# Patient Record
Sex: FEMALE | Race: WHITE | Employment: UNEMPLOYED
[De-identification: names, ages, dates, MRNs, and addresses within clinical notes are randomized per-mention and may not be internally consistent; named-entity substitution may affect disease eponyms.]

---

## 2017-01-09 RX ORDER — ERGOCALCIFEROL 1.25 MG/1
50000 CAPSULE ORAL WEEKLY
Qty: 12 CAPSULE | Refills: 0 | Status: SHIPPED | OUTPATIENT
Start: 2017-01-09 | End: 2017-04-04 | Stop reason: SDUPTHER

## 2017-01-16 ENCOUNTER — TELEPHONE (OUTPATIENT)
Dept: ONCOLOGY | Facility: CLINIC | Age: 37
End: 2017-01-16

## 2017-01-16 ENCOUNTER — OFFICE VISIT (OUTPATIENT)
Dept: ONCOLOGY | Facility: CLINIC | Age: 37
End: 2017-01-16

## 2017-01-16 VITALS
HEART RATE: 89 BPM | DIASTOLIC BLOOD PRESSURE: 74 MMHG | TEMPERATURE: 97.6 F | BODY MASS INDEX: 35.78 KG/M2 | SYSTOLIC BLOOD PRESSURE: 118 MMHG | WEIGHT: 195.6 LBS | RESPIRATION RATE: 20 BRPM

## 2017-01-16 DIAGNOSIS — D72.829 LEUKOCYTOSIS, UNSPECIFIED TYPE: Primary | ICD-10-CM

## 2017-01-16 PROCEDURE — 99214 OFFICE O/P EST MOD 30 MIN: CPT | Performed by: INTERNAL MEDICINE

## 2017-01-17 ENCOUNTER — OFFICE VISIT (OUTPATIENT)
Dept: FAMILY MEDICINE CLINIC | Facility: CLINIC | Age: 37
End: 2017-01-17

## 2017-01-17 VITALS
DIASTOLIC BLOOD PRESSURE: 80 MMHG | HEIGHT: 62 IN | SYSTOLIC BLOOD PRESSURE: 122 MMHG | TEMPERATURE: 98 F | WEIGHT: 196 LBS | OXYGEN SATURATION: 99 % | HEART RATE: 91 BPM | BODY MASS INDEX: 36.07 KG/M2

## 2017-01-17 DIAGNOSIS — E78.5 HYPERLIPIDEMIA, UNSPECIFIED HYPERLIPIDEMIA TYPE: Chronic | ICD-10-CM

## 2017-01-17 DIAGNOSIS — R79.89 ABNORMAL TSH: ICD-10-CM

## 2017-01-17 DIAGNOSIS — D72.829 LEUKOCYTOSIS, UNSPECIFIED TYPE: ICD-10-CM

## 2017-01-17 DIAGNOSIS — E55.9 VITAMIN D DEFICIENCY: Primary | ICD-10-CM

## 2017-01-17 PROCEDURE — 96372 THER/PROPH/DIAG INJ SC/IM: CPT | Performed by: PHYSICIAN ASSISTANT

## 2017-01-17 PROCEDURE — 99213 OFFICE O/P EST LOW 20 MIN: CPT | Performed by: PHYSICIAN ASSISTANT

## 2017-01-17 RX ORDER — KETOROLAC TROMETHAMINE 30 MG/ML
60 INJECTION, SOLUTION INTRAMUSCULAR; INTRAVENOUS ONCE
Status: COMPLETED | OUTPATIENT
Start: 2017-01-17 | End: 2017-01-17

## 2017-01-17 RX ORDER — BUTALBITAL, ACETAMINOPHEN AND CAFFEINE 50; 325; 40 MG/1; MG/1; MG/1
1 TABLET ORAL EVERY 6 HOURS PRN
Qty: 40 TABLET | Refills: 0 | Status: SHIPPED | OUTPATIENT
Start: 2017-01-17 | End: 2018-03-05 | Stop reason: ALTCHOICE

## 2017-01-17 RX ORDER — TRIAMCINOLONE ACETONIDE 40 MG/ML
40 INJECTION, SUSPENSION INTRA-ARTICULAR; INTRAMUSCULAR ONCE
Status: COMPLETED | OUTPATIENT
Start: 2017-01-17 | End: 2017-01-17

## 2017-01-17 RX ORDER — PROMETHAZINE HYDROCHLORIDE 12.5 MG/1
12.5 TABLET ORAL EVERY 8 HOURS PRN
Qty: 30 TABLET | Refills: 0 | Status: SHIPPED | OUTPATIENT
Start: 2017-01-17 | End: 2017-02-16

## 2017-01-17 RX ADMIN — TRIAMCINOLONE ACETONIDE 40 MG: 40 INJECTION, SUSPENSION INTRA-ARTICULAR; INTRAMUSCULAR at 13:43

## 2017-01-17 RX ADMIN — KETOROLAC TROMETHAMINE 60 MG: 30 INJECTION, SOLUTION INTRAMUSCULAR; INTRAVENOUS at 13:42

## 2017-01-17 ASSESSMENT — ENCOUNTER SYMPTOMS
NAUSEA: 0
CHEST TIGHTNESS: 0
SORE THROAT: 0
EYE ITCHING: 0
BLURRED VISION: 1
BACK PAIN: 0
EYE DISCHARGE: 0
SHORTNESS OF BREATH: 0
RHINORRHEA: 0
COUGH: 0
VOMITING: 0
SINUS PRESSURE: 1
ABDOMINAL PAIN: 0
EYE PAIN: 0
DIARRHEA: 0

## 2017-01-24 ENCOUNTER — TELEPHONE (OUTPATIENT)
Dept: FAMILY MEDICINE CLINIC | Facility: CLINIC | Age: 37
End: 2017-01-24

## 2017-01-24 ENCOUNTER — OFFICE VISIT (OUTPATIENT)
Dept: FAMILY MEDICINE CLINIC | Facility: CLINIC | Age: 37
End: 2017-01-24

## 2017-01-24 VITALS
OXYGEN SATURATION: 98 % | BODY MASS INDEX: 35.48 KG/M2 | TEMPERATURE: 98.3 F | HEIGHT: 62 IN | WEIGHT: 192.8 LBS | DIASTOLIC BLOOD PRESSURE: 85 MMHG | SYSTOLIC BLOOD PRESSURE: 122 MMHG | HEART RATE: 97 BPM

## 2017-01-24 DIAGNOSIS — J40 BRONCHITIS: ICD-10-CM

## 2017-01-24 DIAGNOSIS — J32.9 SINUSITIS, UNSPECIFIED CHRONICITY, UNSPECIFIED LOCATION: Primary | ICD-10-CM

## 2017-01-24 DIAGNOSIS — R51.9 HEADACHE, UNSPECIFIED HEADACHE TYPE: ICD-10-CM

## 2017-01-24 PROCEDURE — 99213 OFFICE O/P EST LOW 20 MIN: CPT | Performed by: PHYSICIAN ASSISTANT

## 2017-01-24 RX ORDER — BENZONATATE 200 MG/1
200 CAPSULE ORAL 3 TIMES DAILY PRN
Qty: 30 CAPSULE | Refills: 0 | Status: SHIPPED | OUTPATIENT
Start: 2017-01-24 | End: 2017-01-31

## 2017-01-24 RX ORDER — ALBUTEROL SULFATE 90 UG/1
2 AEROSOL, METERED RESPIRATORY (INHALATION) EVERY 6 HOURS PRN
Qty: 1 INHALER | Refills: 3 | Status: SHIPPED | OUTPATIENT
Start: 2017-01-24 | End: 2018-06-04 | Stop reason: SDUPTHER

## 2017-01-24 RX ORDER — AMOXICILLIN 500 MG/1
500 CAPSULE ORAL 3 TIMES DAILY
Qty: 30 CAPSULE | Refills: 0 | Status: SHIPPED | OUTPATIENT
Start: 2017-01-24 | End: 2017-02-03

## 2017-01-24 RX ORDER — AMITRIPTYLINE HYDROCHLORIDE 50 MG/1
50 TABLET, FILM COATED ORAL NIGHTLY
Qty: 30 TABLET | Refills: 3 | Status: SHIPPED | OUTPATIENT
Start: 2017-01-24 | End: 2017-05-08 | Stop reason: SDUPTHER

## 2017-01-24 ASSESSMENT — ENCOUNTER SYMPTOMS
BLURRED VISION: 0
EYE DISCHARGE: 0
VOMITING: 0
FACIAL SWEATING: 0
SHORTNESS OF BREATH: 0
HEMOPTYSIS: 0
ABDOMINAL PAIN: 0
DIARRHEA: 0
HEARTBURN: 0
SORE THROAT: 1
EYE REDNESS: 0
SINUS PRESSURE: 1
COUGH: 1
EYE ITCHING: 0
RHINORRHEA: 1
NAUSEA: 0
CHEST TIGHTNESS: 0
BACK PAIN: 0
ORTHOPNEA: 0
EYE PAIN: 0

## 2017-02-08 ENCOUNTER — HOSPITAL ENCOUNTER (OUTPATIENT)
Age: 37
Setting detail: SPECIMEN
Discharge: HOME | End: 2017-02-08
Payer: MEDICARE

## 2017-02-08 LAB
ALBUMIN SERPL-MCNC: 4 G/DL (ref 3.5–5.2)
ALBUMIN/GLOBULIN RATIO: 1 (ref 1–2.5)
ALP BLD-CCNC: 75 U/L (ref 35–104)
ALT SERPL-CCNC: 11 U/L (ref 5–33)
ANION GAP SERPL CALCULATED.3IONS-SCNC: 17 MMOL/L (ref 9–17)
AST SERPL-CCNC: 15 U/L
BILIRUB SERPL-MCNC: 0.64 MG/DL (ref 0.3–1.2)
BUN BLDV-MCNC: 10 MG/DL (ref 6–20)
BUN/CREAT BLD: ABNORMAL (ref 9–20)
C-REACTIVE PROTEIN: 23 MG/L (ref 0–5)
CALCIUM SERPL-MCNC: 9.3 MG/DL (ref 8.6–10.4)
CHLORIDE BLD-SCNC: 102 MMOL/L (ref 98–107)
CO2: 22 MMOL/L (ref 20–31)
CREAT SERPL-MCNC: 0.66 MG/DL (ref 0.5–0.9)
FIBRINOGEN: 478 MG/DL (ref 140–420)
GFR AFRICAN AMERICAN: >60 ML/MIN
GFR NON-AFRICAN AMERICAN: >60 ML/MIN
GFR SERPL CREATININE-BSD FRML MDRD: ABNORMAL ML/MIN/{1.73_M2}
GFR SERPL CREATININE-BSD FRML MDRD: ABNORMAL ML/MIN/{1.73_M2}
GLUCOSE BLD-MCNC: 65 MG/DL (ref 70–99)
POTASSIUM SERPL-SCNC: 4.3 MMOL/L (ref 3.7–5.3)
SEDIMENTATION RATE, ERYTHROCYTE: 9 MM (ref 0–20)
SODIUM BLD-SCNC: 141 MMOL/L (ref 135–144)
TOTAL PROTEIN: 8 G/DL (ref 6.4–8.3)

## 2017-02-09 LAB
ANTICARDIOLIPIN IGA ANTIBODY: 2.6 APU
ANTICARDIOLIPIN IGG ANTIBODY: 9.6 GPU
CARDIOLIPIN AB IGM: 8.9 MPU

## 2017-04-04 RX ORDER — ERGOCALCIFEROL 1.25 MG/1
50000 CAPSULE ORAL WEEKLY
Qty: 12 CAPSULE | Refills: 0 | Status: SHIPPED | OUTPATIENT
Start: 2017-04-04 | End: 2017-06-26 | Stop reason: SDUPTHER

## 2017-04-17 ENCOUNTER — OFFICE VISIT (OUTPATIENT)
Dept: FAMILY MEDICINE CLINIC | Age: 37
End: 2017-04-17
Payer: MEDICARE

## 2017-04-17 ENCOUNTER — HOSPITAL ENCOUNTER (OUTPATIENT)
Age: 37
Setting detail: SPECIMEN
Discharge: HOME | End: 2017-04-17
Payer: MEDICARE

## 2017-04-17 VITALS
SYSTOLIC BLOOD PRESSURE: 105 MMHG | WEIGHT: 196.8 LBS | HEART RATE: 90 BPM | DIASTOLIC BLOOD PRESSURE: 72 MMHG | OXYGEN SATURATION: 96 % | HEIGHT: 62 IN | BODY MASS INDEX: 36.22 KG/M2 | TEMPERATURE: 98.6 F

## 2017-04-17 DIAGNOSIS — E55.9 VITAMIN D DEFICIENCY: Primary | ICD-10-CM

## 2017-04-17 DIAGNOSIS — E78.5 HYPERLIPIDEMIA, UNSPECIFIED HYPERLIPIDEMIA TYPE: Chronic | ICD-10-CM

## 2017-04-17 DIAGNOSIS — D72.829 LEUKOCYTOSIS, UNSPECIFIED TYPE: ICD-10-CM

## 2017-04-17 DIAGNOSIS — J45.909 UNCOMPLICATED ASTHMA, UNSPECIFIED ASTHMA SEVERITY: Chronic | ICD-10-CM

## 2017-04-17 DIAGNOSIS — E55.9 VITAMIN D DEFICIENCY: ICD-10-CM

## 2017-04-17 DIAGNOSIS — R79.89 ABNORMAL TSH: ICD-10-CM

## 2017-04-17 DIAGNOSIS — R51.9 HEADACHE, UNSPECIFIED HEADACHE TYPE: ICD-10-CM

## 2017-04-17 LAB — VITAMIN D 25-HYDROXY: 30.8 NG/ML (ref 30–100)

## 2017-04-17 PROCEDURE — 99214 OFFICE O/P EST MOD 30 MIN: CPT | Performed by: PHYSICIAN ASSISTANT

## 2017-04-17 ASSESSMENT — ENCOUNTER SYMPTOMS
NAUSEA: 0
DIARRHEA: 0
SINUS PRESSURE: 0
EYE ITCHING: 0
EYE DISCHARGE: 0
SORE THROAT: 0
ABDOMINAL PAIN: 0
CHEST TIGHTNESS: 0
BELCHING: 0
SHORTNESS OF BREATH: 0
RHINORRHEA: 0
CHOKING: 0
COUGH: 0
VOMITING: 0

## 2017-04-19 ENCOUNTER — TELEPHONE (OUTPATIENT)
Dept: FAMILY MEDICINE CLINIC | Age: 37
End: 2017-04-19

## 2017-05-08 RX ORDER — AMITRIPTYLINE HYDROCHLORIDE 50 MG/1
TABLET, FILM COATED ORAL
Qty: 30 TABLET | Refills: 3 | Status: SHIPPED | OUTPATIENT
Start: 2017-05-08 | End: 2017-08-23

## 2017-05-23 DIAGNOSIS — G44.009 CLUSTER HEADACHE, NOT INTRACTABLE, UNSPECIFIED CHRONICITY PATTERN: Primary | ICD-10-CM

## 2017-05-30 RX ORDER — OMEPRAZOLE 20 MG/1
20 CAPSULE, DELAYED RELEASE ORAL DAILY
Qty: 30 CAPSULE | Refills: 1 | Status: SHIPPED | OUTPATIENT
Start: 2017-05-30 | End: 2017-07-31 | Stop reason: SDUPTHER

## 2017-06-27 ENCOUNTER — TELEPHONE (OUTPATIENT)
Dept: FAMILY MEDICINE CLINIC | Age: 37
End: 2017-06-27

## 2017-06-27 RX ORDER — ERGOCALCIFEROL 1.25 MG/1
50000 CAPSULE ORAL WEEKLY
Qty: 12 CAPSULE | Refills: 0 | Status: SHIPPED | OUTPATIENT
Start: 2017-06-27 | End: 2017-08-23 | Stop reason: DRUGHIGH

## 2017-06-27 NOTE — TELEPHONE ENCOUNTER
Patient stated wrong script was called in should be Vitamin D once daily she no longer takes it once a week

## 2017-07-25 ENCOUNTER — HOSPITAL ENCOUNTER (OUTPATIENT)
Facility: MEDICAL CENTER | Age: 37
Discharge: HOME | End: 2017-07-25
Payer: MEDICARE

## 2017-07-25 DIAGNOSIS — E78.5 HYPERLIPIDEMIA, UNSPECIFIED HYPERLIPIDEMIA TYPE: Chronic | ICD-10-CM

## 2017-07-26 ENCOUNTER — OFFICE VISIT (OUTPATIENT)
Dept: ONCOLOGY | Age: 37
End: 2017-07-26
Payer: MEDICARE

## 2017-07-26 ENCOUNTER — TELEPHONE (OUTPATIENT)
Dept: ONCOLOGY | Age: 37
End: 2017-07-26

## 2017-07-26 ENCOUNTER — HOSPITAL ENCOUNTER (OUTPATIENT)
Facility: MEDICAL CENTER | Age: 37
Discharge: HOME | End: 2017-07-26
Payer: MEDICARE

## 2017-07-26 VITALS
DIASTOLIC BLOOD PRESSURE: 77 MMHG | BODY MASS INDEX: 36.34 KG/M2 | SYSTOLIC BLOOD PRESSURE: 129 MMHG | RESPIRATION RATE: 18 BRPM | HEART RATE: 92 BPM | WEIGHT: 198.7 LBS | TEMPERATURE: 98 F

## 2017-07-26 DIAGNOSIS — E78.5 HYPERLIPIDEMIA, UNSPECIFIED HYPERLIPIDEMIA TYPE: Primary | Chronic | ICD-10-CM

## 2017-07-26 LAB
ABSOLUTE EOS #: 0.2 K/UL (ref 0–0.4)
ABSOLUTE LYMPH #: 3.1 K/UL (ref 1–4.8)
ABSOLUTE MONO #: 0.7 K/UL (ref 0.2–0.8)
BASOPHILS # BLD: 0 %
BASOPHILS ABSOLUTE: 0 K/UL (ref 0–0.2)
DIFFERENTIAL TYPE: ABNORMAL
EOSINOPHILS RELATIVE PERCENT: 2 %
HCT VFR BLD CALC: 43.2 % (ref 36–46)
HEMOGLOBIN: 14.4 G/DL (ref 12–16)
LYMPHOCYTES # BLD: 28 %
MCH RBC QN AUTO: 28.9 PG (ref 26–34)
MCHC RBC AUTO-ENTMCNC: 33.4 G/DL (ref 31–37)
MCV RBC AUTO: 86.3 FL (ref 80–100)
MONOCYTES # BLD: 7 %
PDW BLD-RTO: 14.6 % (ref 11.5–14.5)
PLATELET # BLD: 415 K/UL (ref 130–400)
PLATELET ESTIMATE: ABNORMAL
PMV BLD AUTO: 6.9 FL (ref 6–12)
RBC # BLD: 5 M/UL (ref 4–5.2)
RBC # BLD: ABNORMAL 10*6/UL
SEG NEUTROPHILS: 63 %
SEGMENTED NEUTROPHILS ABSOLUTE COUNT: 7.1 K/UL (ref 1.8–7.7)
WBC # BLD: 11.1 K/UL (ref 3.5–11)
WBC # BLD: ABNORMAL 10*3/UL

## 2017-07-26 PROCEDURE — 99214 OFFICE O/P EST MOD 30 MIN: CPT | Performed by: INTERNAL MEDICINE

## 2017-07-26 PROCEDURE — 85025 COMPLETE CBC W/AUTO DIFF WBC: CPT

## 2017-07-26 PROCEDURE — 36415 COLL VENOUS BLD VENIPUNCTURE: CPT

## 2017-07-26 PROCEDURE — 99211 OFF/OP EST MAY X REQ PHY/QHP: CPT

## 2017-07-31 RX ORDER — OMEPRAZOLE 20 MG/1
20 CAPSULE, DELAYED RELEASE ORAL DAILY
Qty: 30 CAPSULE | Refills: 3 | Status: SHIPPED | OUTPATIENT
Start: 2017-07-31 | End: 2018-12-11 | Stop reason: SDUPTHER

## 2017-08-01 ENCOUNTER — HOSPITAL ENCOUNTER (OUTPATIENT)
Age: 37
Setting detail: SPECIMEN
Discharge: HOME | End: 2017-08-01
Payer: MEDICARE

## 2017-08-01 LAB
FOLATE: 12 NG/ML
THYROXINE, FREE: 1.14 NG/DL (ref 0.93–1.7)
VITAMIN B-12: 534 PG/ML (ref 211–946)

## 2017-08-02 LAB
ALBUMIN (CALCULATED): 4.5 G/DL (ref 3.2–5.2)
ALBUMIN PERCENT: 60 % (ref 45–65)
ALPHA 1 PERCENT: 3 % (ref 3–6)
ALPHA 2 PERCENT: 12 % (ref 6–13)
ALPHA-1-GLOBULIN: 0.2 G/DL (ref 0.1–0.4)
ALPHA-2-GLOBULIN: 0.9 G/DL (ref 0.5–0.9)
ANTI-NUCLEAR ANTIBODY (ANA): NEGATIVE
BETA GLOBULIN: 0.9 G/DL (ref 0.5–1.1)
BETA PERCENT: 12 % (ref 11–19)
GAMMA GLOBULIN %: 14 % (ref 9–20)
GAMMA GLOBULIN: 1 G/DL (ref 0.5–1.5)
PATHOLOGIST: NORMAL
PROTEIN ELECTROPHORESIS, SERUM: NORMAL
TOTAL PROT. SUM,%: 101 % (ref 98–102)
TOTAL PROT. SUM: 7.5 G/DL (ref 6.3–8.2)
TOTAL PROTEIN: 7.6 G/DL (ref 6.4–8.3)

## 2017-08-03 LAB — VITAMIN B6: 47.1 NMOL/L (ref 20–125)

## 2017-08-14 ENCOUNTER — HOSPITAL ENCOUNTER (OUTPATIENT)
Age: 37
Setting detail: SPECIMEN
Discharge: HOME | End: 2017-08-14
Payer: MEDICARE

## 2017-08-14 DIAGNOSIS — E78.5 HYPERLIPIDEMIA, UNSPECIFIED HYPERLIPIDEMIA TYPE: Chronic | ICD-10-CM

## 2017-08-14 DIAGNOSIS — D72.829 LEUKOCYTOSIS, UNSPECIFIED TYPE: ICD-10-CM

## 2017-08-14 DIAGNOSIS — R79.89 ABNORMAL TSH: ICD-10-CM

## 2017-08-14 DIAGNOSIS — J45.909 UNCOMPLICATED ASTHMA, UNSPECIFIED ASTHMA SEVERITY: Chronic | ICD-10-CM

## 2017-08-14 DIAGNOSIS — E55.9 VITAMIN D DEFICIENCY: ICD-10-CM

## 2017-08-14 LAB
ABSOLUTE EOS #: 0.1 K/UL (ref 0–0.4)
ABSOLUTE LYMPH #: 3.8 K/UL (ref 1–4.8)
ABSOLUTE MONO #: 1 K/UL (ref 0.1–1.2)
ALBUMIN SERPL-MCNC: 4 G/DL (ref 3.5–5.2)
ALBUMIN/GLOBULIN RATIO: 1 (ref 1–2.5)
ALP BLD-CCNC: 71 U/L (ref 35–104)
ALT SERPL-CCNC: 10 U/L (ref 5–33)
ANION GAP SERPL CALCULATED.3IONS-SCNC: 15 MMOL/L (ref 9–17)
AST SERPL-CCNC: 14 U/L
BASOPHILS # BLD: 0 %
BASOPHILS ABSOLUTE: 0.1 K/UL (ref 0–0.2)
BILIRUB SERPL-MCNC: 0.83 MG/DL (ref 0.3–1.2)
BUN BLDV-MCNC: 8 MG/DL (ref 6–20)
BUN/CREAT BLD: NORMAL (ref 9–20)
CALCIUM SERPL-MCNC: 9 MG/DL (ref 8.6–10.4)
CHLORIDE BLD-SCNC: 107 MMOL/L (ref 98–107)
CHOLESTEROL/HDL RATIO: 2.8
CHOLESTEROL: 156 MG/DL
CO2: 20 MMOL/L (ref 20–31)
CREAT SERPL-MCNC: 0.71 MG/DL (ref 0.5–0.9)
DIFFERENTIAL TYPE: ABNORMAL
EOSINOPHILS RELATIVE PERCENT: 1 %
GFR AFRICAN AMERICAN: >60 ML/MIN
GFR NON-AFRICAN AMERICAN: >60 ML/MIN
GFR SERPL CREATININE-BSD FRML MDRD: NORMAL ML/MIN/{1.73_M2}
GFR SERPL CREATININE-BSD FRML MDRD: NORMAL ML/MIN/{1.73_M2}
GLUCOSE BLD-MCNC: 75 MG/DL (ref 70–99)
HCT VFR BLD CALC: 42.2 % (ref 36–46)
HDLC SERPL-MCNC: 56 MG/DL
HEMOGLOBIN: 14.2 G/DL (ref 12–16)
LDL CHOLESTEROL: 76 MG/DL (ref 0–130)
LYMPHOCYTES # BLD: 26 %
MCH RBC QN AUTO: 29 PG (ref 26–34)
MCHC RBC AUTO-ENTMCNC: 33.7 G/DL (ref 31–37)
MCV RBC AUTO: 86.3 FL (ref 80–100)
MONOCYTES # BLD: 7 %
PDW BLD-RTO: 14.6 % (ref 12.5–15.4)
PLATELET # BLD: 403 K/UL (ref 140–450)
PLATELET ESTIMATE: ABNORMAL
PMV BLD AUTO: 8.1 FL (ref 6–12)
POTASSIUM SERPL-SCNC: 3.9 MMOL/L (ref 3.7–5.3)
RBC # BLD: 4.89 M/UL (ref 4–5.2)
RBC # BLD: ABNORMAL 10*6/UL
SEG NEUTROPHILS: 66 %
SEGMENTED NEUTROPHILS ABSOLUTE COUNT: 9.7 K/UL (ref 1.8–7.7)
SODIUM BLD-SCNC: 142 MMOL/L (ref 135–144)
THYROXINE, FREE: 1.16 NG/DL (ref 0.93–1.7)
TOTAL PROTEIN: 8 G/DL (ref 6.4–8.3)
TRIGL SERPL-MCNC: 121 MG/DL
TSH SERPL DL<=0.05 MIU/L-ACNC: 5.11 MIU/L (ref 0.3–5)
VITAMIN D 25-HYDROXY: 34.5 NG/ML (ref 30–100)
VLDLC SERPL CALC-MCNC: NORMAL MG/DL (ref 1–30)
WBC # BLD: 14.6 K/UL (ref 3.5–11)
WBC # BLD: ABNORMAL 10*3/UL

## 2017-08-21 ENCOUNTER — OFFICE VISIT (OUTPATIENT)
Dept: FAMILY MEDICINE CLINIC | Age: 37
End: 2017-08-21
Payer: MEDICARE

## 2017-08-21 VITALS
HEART RATE: 95 BPM | BODY MASS INDEX: 36.07 KG/M2 | HEIGHT: 62 IN | DIASTOLIC BLOOD PRESSURE: 76 MMHG | TEMPERATURE: 97.6 F | WEIGHT: 196 LBS | SYSTOLIC BLOOD PRESSURE: 122 MMHG | OXYGEN SATURATION: 98 %

## 2017-08-21 DIAGNOSIS — R51.9 HEADACHE, UNSPECIFIED HEADACHE TYPE: ICD-10-CM

## 2017-08-21 DIAGNOSIS — Z23 NEED FOR INFLUENZA VACCINATION: ICD-10-CM

## 2017-08-21 DIAGNOSIS — E78.5 HYPERLIPIDEMIA, UNSPECIFIED HYPERLIPIDEMIA TYPE: Chronic | ICD-10-CM

## 2017-08-21 DIAGNOSIS — R79.89 ABNORMAL TSH: ICD-10-CM

## 2017-08-21 DIAGNOSIS — E55.9 VITAMIN D DEFICIENCY: ICD-10-CM

## 2017-08-21 DIAGNOSIS — Z23 NEED FOR PNEUMOCOCCAL VACCINATION: ICD-10-CM

## 2017-08-21 DIAGNOSIS — F43.0 STRESS REACTION: Primary | ICD-10-CM

## 2017-08-21 DIAGNOSIS — D72.829 LEUKOCYTOSIS, UNSPECIFIED TYPE: ICD-10-CM

## 2017-08-21 PROCEDURE — 90732 PPSV23 VACC 2 YRS+ SUBQ/IM: CPT | Performed by: PHYSICIAN ASSISTANT

## 2017-08-21 PROCEDURE — 90471 IMMUNIZATION ADMIN: CPT | Performed by: PHYSICIAN ASSISTANT

## 2017-08-21 PROCEDURE — 99214 OFFICE O/P EST MOD 30 MIN: CPT | Performed by: PHYSICIAN ASSISTANT

## 2017-08-21 RX ORDER — TOPIRAMATE 50 MG/1
TABLET, FILM COATED ORAL
Refills: 0 | COMMUNITY
Start: 2017-07-28 | End: 2017-10-02

## 2017-08-21 RX ORDER — ATORVASTATIN CALCIUM 20 MG/1
TABLET, FILM COATED ORAL
Qty: 30 TABLET | Refills: 3 | Status: SHIPPED | OUTPATIENT
Start: 2017-08-21 | End: 2017-12-21 | Stop reason: SDUPTHER

## 2017-08-21 RX ORDER — LEVOTHYROXINE SODIUM 0.03 MG/1
25 TABLET ORAL DAILY
Qty: 30 TABLET | Refills: 3 | Status: SHIPPED | OUTPATIENT
Start: 2017-08-21 | End: 2017-12-21 | Stop reason: SDUPTHER

## 2017-08-21 RX ORDER — PROMETHAZINE HYDROCHLORIDE 25 MG/1
TABLET ORAL
Refills: 0 | COMMUNITY
Start: 2017-08-02 | End: 2018-09-10

## 2017-08-21 ASSESSMENT — ENCOUNTER SYMPTOMS
SHORTNESS OF BREATH: 0
CHEST TIGHTNESS: 0
SINUS PRESSURE: 0
EYE DISCHARGE: 0
SORE THROAT: 0
BELCHING: 0
COUGH: 0
CHOKING: 0
ABDOMINAL PAIN: 0
BLURRED VISION: 0
BACK PAIN: 0
RHINORRHEA: 0
DIARRHEA: 0
EYE ITCHING: 0
HEARTBURN: 0
HOARSE VOICE: 0
VOMITING: 0
GLOBUS SENSATION: 0
NAUSEA: 0

## 2017-08-23 ENCOUNTER — TELEPHONE (OUTPATIENT)
Dept: FAMILY MEDICINE CLINIC | Age: 37
End: 2017-08-23

## 2017-08-23 ENCOUNTER — OFFICE VISIT (OUTPATIENT)
Dept: FAMILY MEDICINE CLINIC | Age: 37
End: 2017-08-23
Payer: MEDICARE

## 2017-08-23 VITALS
TEMPERATURE: 97.9 F | DIASTOLIC BLOOD PRESSURE: 80 MMHG | HEIGHT: 62 IN | WEIGHT: 193 LBS | OXYGEN SATURATION: 98 % | HEART RATE: 104 BPM | BODY MASS INDEX: 35.51 KG/M2 | SYSTOLIC BLOOD PRESSURE: 110 MMHG

## 2017-08-23 DIAGNOSIS — R51.9 HEADACHE, UNSPECIFIED HEADACHE TYPE: Primary | ICD-10-CM

## 2017-08-23 PROCEDURE — 99213 OFFICE O/P EST LOW 20 MIN: CPT | Performed by: PHYSICIAN ASSISTANT

## 2017-08-23 PROCEDURE — 96372 THER/PROPH/DIAG INJ SC/IM: CPT | Performed by: PHYSICIAN ASSISTANT

## 2017-08-23 RX ORDER — AMITRIPTYLINE HYDROCHLORIDE 75 MG/1
75 TABLET, FILM COATED ORAL NIGHTLY
Qty: 30 TABLET | Refills: 3 | Status: SHIPPED | OUTPATIENT
Start: 2017-08-23 | End: 2017-12-05 | Stop reason: SDUPTHER

## 2017-08-23 RX ORDER — PREDNISONE 20 MG/1
TABLET ORAL
Qty: 18 TABLET | Refills: 0 | Status: SHIPPED | OUTPATIENT
Start: 2017-08-23 | End: 2017-09-02

## 2017-08-23 RX ORDER — KETOROLAC TROMETHAMINE 30 MG/ML
60 INJECTION, SOLUTION INTRAMUSCULAR; INTRAVENOUS ONCE
Status: COMPLETED | OUTPATIENT
Start: 2017-08-23 | End: 2017-08-23

## 2017-08-23 RX ADMIN — KETOROLAC TROMETHAMINE 60 MG: 30 INJECTION, SOLUTION INTRAMUSCULAR; INTRAVENOUS at 15:39

## 2017-08-23 ASSESSMENT — ENCOUNTER SYMPTOMS
SORE THROAT: 0
RHINORRHEA: 0
BELCHING: 0
CHEST TIGHTNESS: 0
CHOKING: 0
ABDOMINAL PAIN: 0
HOARSE VOICE: 0
GLOBUS SENSATION: 0
BLURRED VISION: 0
SINUS PRESSURE: 0
SHORTNESS OF BREATH: 0
NAUSEA: 0
EYE DISCHARGE: 0
VOMITING: 0
DIARRHEA: 0
HEARTBURN: 0
EYE ITCHING: 0
COUGH: 0
BACK PAIN: 0

## 2017-08-29 ENCOUNTER — HOSPITAL ENCOUNTER (OUTPATIENT)
Dept: CT IMAGING | Facility: CLINIC | Age: 37
Discharge: HOME | End: 2017-08-29
Payer: MEDICARE

## 2017-08-29 ENCOUNTER — HOSPITAL ENCOUNTER (OUTPATIENT)
Age: 37
Setting detail: SPECIMEN
Discharge: HOME | End: 2017-08-29
Payer: MEDICARE

## 2017-08-29 ENCOUNTER — OFFICE VISIT (OUTPATIENT)
Dept: FAMILY MEDICINE CLINIC | Age: 37
End: 2017-08-29
Payer: MEDICARE

## 2017-08-29 VITALS
BODY MASS INDEX: 35.63 KG/M2 | SYSTOLIC BLOOD PRESSURE: 126 MMHG | DIASTOLIC BLOOD PRESSURE: 74 MMHG | TEMPERATURE: 97.8 F | HEART RATE: 116 BPM | HEIGHT: 62 IN | OXYGEN SATURATION: 98 % | WEIGHT: 193.6 LBS

## 2017-08-29 DIAGNOSIS — R10.9 FLANK PAIN: ICD-10-CM

## 2017-08-29 DIAGNOSIS — R10.9 FLANK PAIN: Primary | ICD-10-CM

## 2017-08-29 DIAGNOSIS — N39.0 URINARY TRACT INFECTION WITHOUT HEMATURIA, SITE UNSPECIFIED: ICD-10-CM

## 2017-08-29 LAB
BILIRUBIN, POC: ABNORMAL
BLOOD URINE, POC: ABNORMAL
CLARITY, POC: CLEAR
COLOR, POC: YELLOW
CONTROL: PRESENT
GLUCOSE URINE, POC: ABNORMAL
KETONES, POC: ABNORMAL
LEUKOCYTE EST, POC: ABNORMAL
NITRITE, POC: ABNORMAL
PH, POC: 5
PREGNANCY TEST URINE, POC: NORMAL
PROTEIN, POC: ABNORMAL
SPECIFIC GRAVITY, POC: 1.02
UROBILINOGEN, POC: 0.2

## 2017-08-29 PROCEDURE — 74176 CT ABD & PELVIS W/O CONTRAST: CPT

## 2017-08-29 PROCEDURE — 81025 URINE PREGNANCY TEST: CPT | Performed by: PHYSICIAN ASSISTANT

## 2017-08-29 PROCEDURE — 99213 OFFICE O/P EST LOW 20 MIN: CPT | Performed by: PHYSICIAN ASSISTANT

## 2017-08-29 PROCEDURE — 81003 URINALYSIS AUTO W/O SCOPE: CPT | Performed by: PHYSICIAN ASSISTANT

## 2017-08-29 RX ORDER — CIPROFLOXACIN 500 MG/1
500 TABLET, FILM COATED ORAL 2 TIMES DAILY
Qty: 20 TABLET | Refills: 0 | Status: SHIPPED | OUTPATIENT
Start: 2017-08-29 | End: 2017-10-02 | Stop reason: ALTCHOICE

## 2017-08-29 ASSESSMENT — ENCOUNTER SYMPTOMS
CHEST TIGHTNESS: 0
SHORTNESS OF BREATH: 0
HEMATOCHEZIA: 0
RHINORRHEA: 0
SINUS PRESSURE: 0
BELCHING: 0
COUGH: 0
EYE ITCHING: 0
ABDOMINAL PAIN: 1
SORE THROAT: 0
DIARRHEA: 0
VOMITING: 0
EYE DISCHARGE: 0
NAUSEA: 0
FLATUS: 0
CONSTIPATION: 0

## 2017-08-30 PROCEDURE — 90472 IMMUNIZATION ADMIN EACH ADD: CPT | Performed by: PHYSICIAN ASSISTANT

## 2017-08-30 PROCEDURE — 90688 IIV4 VACCINE SPLT 0.5 ML IM: CPT | Performed by: PHYSICIAN ASSISTANT

## 2017-08-31 LAB
CULTURE: NORMAL
CULTURE: NORMAL
Lab: NORMAL
SPECIMEN DESCRIPTION: NORMAL
STATUS: NORMAL

## 2017-10-02 ENCOUNTER — OFFICE VISIT (OUTPATIENT)
Dept: FAMILY MEDICINE CLINIC | Age: 37
End: 2017-10-02
Payer: MEDICARE

## 2017-10-02 VITALS
SYSTOLIC BLOOD PRESSURE: 128 MMHG | HEART RATE: 86 BPM | BODY MASS INDEX: 36.23 KG/M2 | OXYGEN SATURATION: 98 % | DIASTOLIC BLOOD PRESSURE: 76 MMHG | WEIGHT: 196.9 LBS | HEIGHT: 62 IN | TEMPERATURE: 98.4 F

## 2017-10-02 DIAGNOSIS — R51.9 HEADACHE, UNSPECIFIED HEADACHE TYPE: ICD-10-CM

## 2017-10-02 DIAGNOSIS — R20.2 LEG PARESTHESIA: Primary | ICD-10-CM

## 2017-10-02 DIAGNOSIS — R20.2 ARM PARESTHESIA, RIGHT: ICD-10-CM

## 2017-10-02 PROCEDURE — 99213 OFFICE O/P EST LOW 20 MIN: CPT | Performed by: PHYSICIAN ASSISTANT

## 2017-10-02 RX ORDER — TOPIRAMATE 100 MG/1
TABLET, FILM COATED ORAL
Refills: 0 | COMMUNITY
Start: 2017-09-01 | End: 2018-02-27 | Stop reason: ALTCHOICE

## 2017-10-02 ASSESSMENT — ENCOUNTER SYMPTOMS
BACK PAIN: 0
BELCHING: 0
VOMITING: 0
NAUSEA: 0
SINUS PRESSURE: 0
EYE PAIN: 0
EYE ITCHING: 0
PHOTOPHOBIA: 1
EYE REDNESS: 0
CHEST TIGHTNESS: 0
CHOKING: 0
GLOBUS SENSATION: 0
HOARSE VOICE: 0
COUGH: 0
RHINORRHEA: 0
SORE THROAT: 0
EYE WATERING: 0
SHORTNESS OF BREATH: 0
HEARTBURN: 0
ABDOMINAL PAIN: 0
BLURRED VISION: 0
DIARRHEA: 0
EYE DISCHARGE: 0
FACIAL SWEATING: 0

## 2017-10-02 NOTE — PROGRESS NOTES
[facility]  [address] [place] [id number]  Dept: [phone]  Dept Fax: [phone]    Elaina Mejia is a 31 y.o. female who presents today for her medical conditions/complaints as noted below. Elaina Mejia is c/o of   Chief Complaint   Patient presents with    Numbness     hand ands feet    Pressure Behind the Eyes    Blurred Vision     Visit Information    Have you changed or started any medications since your last visit including any over-the-counter medicines, vitamins, or herbal medicines? no   Have you stopped taking any of your medications? Is so, why? -  no  Are you having any side effects from any of your medications? - no    Have you seen any other physician or provider since your last visit?  no   Have you had any other diagnostic tests since your last visit?  no   Have you been seen in the emergency room and/or had an admission in a hospital since we last saw you?  no   Have you had your routine dental cleaning in the past 6 months?  yes -      Do you have an active [employer] account? If no, what is the barrier? No:     Patient Care Team:  Anitha Centeno PA-C as PCP - General (Physician Assistant)  Patricia Reveles MD as Consulting Physician (Hematology and Oncology)  Bob Reyes DO as Obstetrician (Obstetrics & Gynecology)    Medical History Review  Past Medical, Family, and Social History reviewed and does contribute to the patient presenting condition    Health Maintenance   Topic Date Due    HIV screen  11/14/1997    Cervical cancer screen  05/14/2018    Flu vaccine  Completed    Pneumococcal med risk  Completed                 HPI:     Migraine    This is a chronic problem. The pain is at a severity of 7/10. The pain is moderate. Associated symptoms include phonophobia and photophobia.  Pertinent negatives include no abdominal pain, abnormal behavior, anorexia, back pain, blurred vision, coughing, dizziness, drainage, ear pain, eye pain, eye redness, eye watering, facial sweating, fever, hearing loss, insomnia, loss of balance, muscle aches, nausea, neck pain, numbness, rhinorrhea, sinus pressure, sore throat, vomiting, weakness or weight loss. There is no history of obesity. Hyperlipidemia   This is a chronic problem. The current episode started more than 1 year ago. She has no history of chronic renal disease, diabetes, hypothyroidism, liver disease, obesity or nephrotic syndrome. Pertinent negatives include no chest pain, focal sensory loss, focal weakness, leg pain, myalgias or shortness of breath. The current treatment provides moderate improvement of lipids. Risk factors for coronary artery disease include obesity and dyslipidemia. Gastroesophageal Reflux   She reports no abdominal pain, no belching, no chest pain, no choking, no coughing, no early satiety, no globus sensation, no heartburn, no hoarse voice, no nausea or no sore throat. This is a chronic problem. Associated symptoms include fatigue. Pertinent negatives include no anemia, melena, muscle weakness, orthopnea or weight loss. Past procedures do not include an abdominal ultrasound or an EGD. Past invasive treatments do not include gastroplasty. Headache    This is a chronic problem. The pain quality is similar to prior headaches. Associated symptoms include phonophobia and photophobia. Pertinent negatives include no abdominal pain, abnormal behavior, anorexia, back pain, blurred vision, coughing, dizziness, drainage, ear pain, eye pain, eye redness, eye watering, facial sweating, fever, hearing loss, insomnia, loss of balance, muscle aches, nausea, neck pain, numbness, rhinorrhea, sinus pressure, sore throat, vomiting, weakness or weight loss. There is no history of obesity.        No results found for: LABA1C          ( goal A1C is < 7)   No results found for: LABMICR  LDL Cholesterol (mg/dL)   Date Value   08/14/2017 76   08/18/2016 75   09/11/2015 80       (goal LDL is <100)   AST (U/L)   Date Value   08/14/2017 14     ALT (U/L)   Date Value   08/14/2017 10     BUN (mg/dL)   Date Value   08/14/2017 8     BP Readings from Last 3 Encounters:   10/02/17 128/76   08/29/17 126/74   08/23/17 110/80          (goal 120/80)    Past Medical History:   Diagnosis Date    Allergic rhinitis     Arthritis     Asthma     GERD (gastroesophageal reflux disease)     Heart murmur     Hyperlipidemia 9/15/2015    Petit mal ([address]     Psoriasis     Pulmonary stenosis       Past Surgical History:   Procedure Laterality Date    LEEP  2008    WISDOM TOOTH EXTRACTION Right 1/8/16    WISDOM TOOTH EXTRACTION         Family History   Problem Relation Age of Onset    Diabetes Father     Other Mother      blood disorder    Other Maternal Aunt      blood disorder    Cancer Maternal Aunt      breast    Other Paternal Aunt      chiari malformation type 2     Cancer Paternal Aunt      ovarian cancer    Cancer Maternal Grandmother     Cancer Maternal Grandfather     Cancer Paternal Grandmother     High Blood Pressure Paternal Grandfather     Heart Disease Paternal Grandfather     Other Maternal Aunt      brain aneurysm       Social History   Substance Use Topics    Smoking status: Never Smoker    Smokeless tobacco: Never Used    Alcohol use 0.0 oz/week     0 Standard drinks or equivalent per week      Comment: social       Current Outpatient Prescriptions   Medication Sig Dispense Refill    topiramate (TOPAMAX) 100 MG tablet take 1 tablet by mouth twice a day  0    amitriptyline (ELAVIL) 75 MG tablet Take 1 tablet by mouth nightly 30 tablet 3    promethazine (PHENERGAN) 25 MG tablet TAKE 1 TABLET BY MOUTH DAILY IF NEEDED FOR NAUSEA AND VOMITING  0    atorvastatin (LIPITOR) 20 MG tablet take 1 tablet by mouth once daily 30 tablet 3    levothyroxine (SYNTHROID) 25 MCG tablet Take 1 tablet by mouth Daily 30 tablet 3    omeprazole (PRILOSEC) 20 MG delayed release capsule Take 1 5/14/15      Peripheral edema - Elevation. Compression stockings. Non pitting      Preop - Dental extraction. EKG here reviewed. Echo from 2015 reviewed. Labs and cxr ordered. D/w pt that she is ok for surgery. She is wanting to see cardiology b/c she wants to get established. Pt states h/o valve disease. Echo shows no significant valve disease.      Cephalgia - H/o HA since teens. L orbital/frontal. N/V. HA daily. Start elavil. Photosensitivity/aura. Neurointact. Pt denies pregnancy. Start elavil. Doing better. Still having HA. Increase dose. MRI neg. Was on Relief with fioricet. Seen in ED 8/2017. Still with HA. Start steroid burst.  Ketoralac today. Also on topamax 100mg BID through neuro. Seeing 2 neurologists through [facility].     Fatigue - Labs ordered. Pt states worse with heat. No depression, wt loss, night sweats or chills. Pt denies preg. Inflammatory markers elevated. Referral to rheum.      Vitamin d def - Replace and recheck 12 wk.      Balanced diet and routine exercise encouraged.      Multivitamin with vitamin D daily encouraged.      Good sleep hygiene encouraged.      Dental exam q 6 mo.      Vision yearly.       Sunscreen encouraged.      Immunizations reviewed. Flu - 8/21/17    Prevnar 13 - 8/17/16    Pneumovax 23 - 8/21/17    Tdap - Pt states cannot take    Vision exam - Spring 2017     Patient given educational materials - see patient instructions. Discussed use, benefit, and side effects of prescribed medications. All patient questions answered. Pt voiced understanding. Reviewed health maintenance. Instructed to continue current medications, diet and exercise. Patient agreed with treatment plan. Follow up as directed.      Electronically signed by Columba Posey PA-C on 10/2/2017 at 7:37 AM

## 2017-10-02 NOTE — MR AVS SNAPSHOT
After Visit Summary             Lena Krishnan   10/2/2017 7:00 AM   Office Visit    Description:  Female : 1980   Provider:  Lily Beasley PA-C   Department:  [facility]              Your Follow-Up and Future Appointments         Below is a list of your follow-up and future appointments. This may not be a complete list as you may have made appointments directly with providers that we are not aware of or your providers may have made some for you. Please call your providers to confirm appointments. It is important to keep your appointments. Please bring your current insurance card, photo ID, co-pay, and all medication bottles to your appointment. If self-pay, payment is expected at the time of service. Your To-Do List     Future Appointments Provider Department Dept Phone    2017 8:30 AM Lily Beasley PA-C [facility] [phone]    Please arrive 15 minutes prior to appointment, bring photo ID and insurance card. 2018 10:00 AM SCHEDULE, AKTHE Nino [phone]    If this is a fasting lab, please do not eat or drink past midnight other than water. 2018 10:00 AM Cheri Patel MD [facility] [phone]    Please arrive 15 minutes prior to appointment time, bring insurance card and photo ID.      Future Orders Complete By Expires    EMG [NEU5 Custom]  10/2/2017 (Approximate) 2017    Folate [LAB69 Custom]  10/2/2017 10/2/2018    Vitamin B12 [LAB67 Custom]  10/2/2017 (Approximate) 2017         Information from Your Visit        Department     Name Address Phone Fax    [facility] [address] [facility] [phone]      You Were Seen for:         Comments    Leg paresthesia   [[id number]]         Vital Signs     Blood Pressure Pulse Temperature Height Weight Last Menstrual Period 128/76 86 98.4 °F (36.9 °C) (Oral) 5' 2\" (1.575 m) 196 lb 14.4 oz (89.3 kg) 09/18/2017 (Approximate)    Oxygen Saturation Breastfeeding? Body Mass Index Smoking Status          98% Yes 36.01 kg/m2 Never Smoker        Additional Information about your Body Mass Index (BMI)           Your BMI as listed above is considered obese (30 or more). BMI is an estimate of body fat, calculated from your height and weight. The higher your BMI, the greater your risk of heart disease, high blood pressure, type 2 diabetes, stroke, gallstones, arthritis, sleep apnea, and certain cancers. BMI is not perfect. It may overestimate body fat in athletes and people who are more muscular. Even a small weight loss (between 5 and 10 percent of your current weight) by decreasing your calorie intake and becoming more physically active will help lower your risk of developing or worsening diseases associated with obesity. Learn more at: [employer]             Today's Medication Changes          These changes are accurate as of: 10/2/17  7:41 AM.  If you have any questions, ask your nurse or doctor. CHANGE how you take these medications           topiramate 100 MG tablet   Commonly known as:  TOPAMAX   Instructions:  take 1 tablet by mouth twice a day   Refills:  0   What changed:  Another medication with the same name was removed. Continue taking this medication, and follow the directions you see here.    Changed by:  Cesar Pittman PA-C         STOP taking these medications           ciprofloxacin 500 MG tablet   Commonly known as:  CIPRO   Stopped by:  Cesar Pittman PA-C               Your Current Medications Are              topiramate (TOPAMAX) 100 MG tablet take 1 tablet by mouth twice a day    amitriptyline (ELAVIL) 75 MG tablet Take 1 tablet by mouth nightly    promethazine (PHENERGAN) 25 MG tablet TAKE 1 TABLET BY MOUTH DAILY IF NEEDED FOR NAUSEA AND VOMITING atorvastatin (LIPITOR) 20 MG tablet take 1 tablet by mouth once daily    levothyroxine (SYNTHROID) 25 MCG tablet Take 1 tablet by mouth Daily    omeprazole (PRILOSEC) 20 MG delayed release capsule Take 1 capsule by mouth Daily    Cholecalciferol (VITAMIN D) 2000 units CAPS capsule Take 2,000 Units by mouth daily    albuterol sulfate HFA (PROAIR HFA) 108 (90 BASE) MCG/ACT inhaler Inhale 2 puffs into the lungs every 6 hours as needed for Wheezing    butalbital-acetaminophen-caffeine (FIORICET, ESGIC) -40 MG per tablet Take 1 tablet by mouth every 6 hours as needed for Headaches    CRYSELLE-28 0.3-30 MG-MCG per tablet take 1 tablet by mouth once daily    ibuprofen (ADVIL;MOTRIN) 800 MG tablet Take 1 tablet by mouth every 8 hours as needed for Pain    diphenhydrAMINE (BENADRYL) 25 MG tablet Take 25 mg by mouth every 6 hours as needed for Itching      Allergies              Food Hives    Oysters, clams, mussels    Bactrim [Sulfamethoxazole-trimethoprim] Hives    Decadron [Dexamethasone]     Hives and hot flashes    Seasonal     Tetanus Toxoids       We Ordered/Performed the following           [facility] Beverly Graff MD, [facility] Instructions:    [facility]- MD Kenan Rincon [address]  [phone]    Comments: The patient can be scheduled with any member of the group, including the provider with the first available appointments.           Additional Information        Basic Information     Date Of Birth Sex Race Ethnicity Preferred Language    1980 Female White Non-/Non  English      Problem List as of 10/2/2017  Date Reviewed: 10/2/2017                Need for influenza vaccination    Need for pneumococcal vaccination    Headache    Vitamin D deficiency    Hyperlipidemia (Chronic)    Elevated WBC count    Abnormal TSH    Asthma (Chronic)    Family planning    Murmur (Chronic)    Seasonal allergies Immunizations as of 10/2/2017     Name Date    Influenza, Intradermal, Preservative free 8/30/2017, 10/20/2015    Influenza, Suellyn Morning, 3 yrs and older, IM, Preservative Free 10/17/2016    Pneumococcal 13-valent Conjugate (Marlen Mancuso) 8/17/2016    Pneumococcal Polysaccharide ([id number]) 8/21/2017      Preventive Care        Date Due    HIV screening is recommended for all people regardless of risk factors  aged 15-65 years at least once (lifetime) who have never been HIV tested. 11/14/1997    Pap Smear 5/14/2018            MyChart Signup           Our records indicate that you have an active [employer] [facility] [place] account. You can view your After Visit Summary by going to [email or web address] and logging in with your [employer] [facility] [place] username and password. If you don't have a [employer] [facility] [place] username and password but a parent or guardian has access to your record, the parent or guardian should login with their own [employer] [facility] [place] username and password and access your record to view the After Visit Summary. Additional Information  If you have questions, please contact the physician practice where you receive care. Remember, [employer] [facility] [place] is NOT to be used for urgent needs. For medical emergencies, dial 911. For questions regarding your [employer] [facility] [place] account call [phone]. If you have a clinical question, please call your doctor's office.

## 2017-10-17 NOTE — TELEPHONE ENCOUNTER
From: Izabel Man  Sent: 10/17/2017 9:33 AM EDT  Subject: Medication Renewal Request    Izabel Magda would like a refill of the following medications:  CRYSELLE-28 0.3-30 MG-MCG per tablet [EMANUEL Beach PA-C]    Preferred pharmacy: [facility] [phone] -  [phone]    Comment:  I have no refill left, only have enough till end of week. thank you.

## 2017-11-13 ENCOUNTER — HOSPITAL ENCOUNTER (OUTPATIENT)
Age: 37
Setting detail: SPECIMEN
Discharge: HOME | End: 2017-11-13
Payer: MEDICARE

## 2017-11-13 DIAGNOSIS — R20.2 ARM PARESTHESIA, RIGHT: ICD-10-CM

## 2017-11-13 DIAGNOSIS — R20.2 LEG PARESTHESIA: ICD-10-CM

## 2017-11-13 DIAGNOSIS — R79.89 ABNORMAL TSH: ICD-10-CM

## 2017-11-13 LAB
FOLATE: 15.6 NG/ML
THYROXINE, FREE: 1.06 NG/DL (ref 0.93–1.7)
TSH SERPL DL<=0.05 MIU/L-ACNC: 2.32 MIU/L (ref 0.3–5)
VITAMIN B-12: 373 PG/ML (ref 211–946)

## 2017-11-28 ENCOUNTER — OFFICE VISIT (OUTPATIENT)
Dept: FAMILY MEDICINE CLINIC | Age: 37
End: 2017-11-28
Payer: MEDICARE

## 2017-11-28 ENCOUNTER — HOSPITAL ENCOUNTER (OUTPATIENT)
Age: 37
Setting detail: SPECIMEN
Discharge: HOME | End: 2017-11-28
Payer: MEDICARE

## 2017-11-28 VITALS
SYSTOLIC BLOOD PRESSURE: 112 MMHG | TEMPERATURE: 98.3 F | BODY MASS INDEX: 36.07 KG/M2 | HEIGHT: 62 IN | WEIGHT: 196 LBS | HEART RATE: 105 BPM | DIASTOLIC BLOOD PRESSURE: 70 MMHG | OXYGEN SATURATION: 98 %

## 2017-11-28 DIAGNOSIS — K62.5 RECTAL BLEEDING: Primary | ICD-10-CM

## 2017-11-28 DIAGNOSIS — R10.13 EPIGASTRIC PAIN: ICD-10-CM

## 2017-11-28 DIAGNOSIS — R11.0 NAUSEA: ICD-10-CM

## 2017-11-28 DIAGNOSIS — R51.9 NONINTRACTABLE HEADACHE, UNSPECIFIED CHRONICITY PATTERN, UNSPECIFIED HEADACHE TYPE: ICD-10-CM

## 2017-11-28 PROCEDURE — G8484 FLU IMMUNIZE NO ADMIN: HCPCS | Performed by: PHYSICIAN ASSISTANT

## 2017-11-28 PROCEDURE — 1036F TOBACCO NON-USER: CPT | Performed by: PHYSICIAN ASSISTANT

## 2017-11-28 PROCEDURE — 99213 OFFICE O/P EST LOW 20 MIN: CPT | Performed by: PHYSICIAN ASSISTANT

## 2017-11-28 PROCEDURE — G8427 DOCREV CUR MEDS BY ELIG CLIN: HCPCS | Performed by: PHYSICIAN ASSISTANT

## 2017-11-28 PROCEDURE — G8417 CALC BMI ABV UP PARAM F/U: HCPCS | Performed by: PHYSICIAN ASSISTANT

## 2017-11-28 RX ORDER — FLUTICASONE PROPIONATE 50 MCG
2 SPRAY, SUSPENSION (ML) NASAL DAILY PRN
COMMUNITY
Start: 2017-11-14 | End: 2021-02-22 | Stop reason: SINTOL

## 2017-11-28 RX ORDER — KETOROLAC TROMETHAMINE 30 MG/ML
60 INJECTION, SOLUTION INTRAMUSCULAR; INTRAVENOUS ONCE
Status: COMPLETED | OUTPATIENT
Start: 2017-11-28 | End: 2017-11-28

## 2017-11-28 RX ADMIN — KETOROLAC TROMETHAMINE 60 MG: 30 INJECTION, SOLUTION INTRAMUSCULAR; INTRAVENOUS at 09:45

## 2017-11-28 ASSESSMENT — ENCOUNTER SYMPTOMS
DIARRHEA: 0
RHINORRHEA: 0
SINUS PRESSURE: 0
EYE DISCHARGE: 0
DIFFICULTY BREATHING: 0
BACK PAIN: 0
HEMATOCHEZIA: 1
SORE THROAT: 0
FACIAL SWEATING: 0
BELCHING: 0
NAUSEA: 0
SHORTNESS OF BREATH: 0
EYE ITCHING: 0
HOARSE VOICE: 0
CHEST TIGHTNESS: 0
PHOTOPHOBIA: 1
ABDOMINAL PAIN: 1
EYE PAIN: 0
VOMITING: 0
RECTAL PAIN: 0
GLOBUS SENSATION: 0
EYE REDNESS: 0
HEARTBURN: 0
COUGH: 0
EYE WATERING: 0
BLURRED VISION: 0
CHOKING: 0
HEMATEMESIS: 0

## 2017-11-28 ASSESSMENT — PATIENT HEALTH QUESTIONNAIRE - PHQ9
SUM OF ALL RESPONSES TO PHQ9 QUESTIONS 1 & 2: 0
1. LITTLE INTEREST OR PLEASURE IN DOING THINGS: 0
SUM OF ALL RESPONSES TO PHQ QUESTIONS 1-9: 0
2. FEELING DOWN, DEPRESSED OR HOPELESS: 0

## 2017-11-28 NOTE — PROGRESS NOTES
After obtaining consent, and per orders of Dr. Oliver Glynn, injection of toradol given in right gluteal by Julien Hunter. Patient instructed to remain in clinic for 20 minutes afterwards, and to report any adverse reaction to me immediately.  No reactions at this time

## 2017-11-28 NOTE — PROGRESS NOTES
Visit Information    Have you changed or started any medications since your last visit including any over-the-counter medicines, vitamins, or herbal medicines? no   Have you stopped taking any of your medications? Is so, why? -  no  Are you having any side effects from any of your medications? - yes - hair loss    Have you seen any other physician or provider since your last visit? yes - ent   Have you had any other diagnostic tests since your last visit?  no   Have you been seen in the emergency room and/or had an admission in a hospital since we last saw you?  no   Have you had your routine dental cleaning in the past 6 months?  no     Do you have an active MyChart account? If no, what is the barrier?   Yes    Patient Care Team:  Cathy Stroud as PCP - General (Physician Assistant)  Imer Rooney MD as Consulting Physician (Hematology and Oncology)  Nyasia Norton DO as Obstetrician (Obstetrics & Gynecology)    Medical History Review  Past Medical, Family, and Social History reviewed and does contribute to the patient presenting condition    Health Maintenance   Topic Date Due    HIV screen  11/14/1997    Cervical cancer screen  05/14/2018    Flu vaccine  Completed    Pneumococcal med risk  Completed

## 2017-11-28 NOTE — PROGRESS NOTES
[facility]  [address] [place] [id number]  Dept: [phone]  Dept Fax: [phone]    Eva Carlisle is a 30 y.o. female who presents today for her medical conditions/complaints as noted below. Eva Carlisle is c/o of   Chief Complaint   Patient presents with    Rectal Bleeding     saturday     Mole     back of neck    Discuss Labs     Visit Information    Have you changed or started any medications since your last visit including any over-the-counter medicines, vitamins, or herbal medicines? no   Have you stopped taking any of your medications? Is so, why? -  no  Are you having any side effects from any of your medications? - no    Have you seen any other physician or provider since your last visit?  no   Have you had any other diagnostic tests since your last visit?  no   Have you been seen in the emergency room and/or had an admission in a hospital since we last saw you?  no   Have you had your routine dental cleaning in the past 6 months?  yes -      Do you have an active [employer] account? If no, what is the barrier? No:     Patient Care Team:  Jhoan Blake PA-C as PCP - General (Physician Assistant)  Devon Boucher MD as Consulting Physician (Hematology and Oncology)  Rk Trotter DO as Obstetrician (Obstetrics & Gynecology)    Medical History Review  Past Medical, Family, and Social History reviewed and does contribute to the patient presenting condition    Health Maintenance   Topic Date Due    HIV screen  11/14/1997    Cervical cancer screen  05/14/2018    Flu vaccine  Completed    Pneumococcal med risk  Completed                 HPI:     Migraine    This is a chronic problem. The pain is at a severity of 7/10. The pain is moderate. Associated symptoms include abdominal pain, phonophobia and photophobia.  Pertinent negatives include no abnormal behavior, anorexia, back pain, blurred vision, coughing, dizziness, drainage, ear pain, eye pain. Pertinent negatives include no anorexia, no fever, no diarrhea, no hematemesis, no hemorrhoids, no nausea, no rectal pain, no vomiting, no hematuria, no vaginal bleeding, no vaginal discharge, no chest pain, no headaches, no coughing, no difficulty breathing and no rash.        No results found for: LABA1C          ( goal A1C is < 7)   No results found for: LABMICR  LDL Cholesterol (mg/dL)   Date Value   08/14/2017 76   08/18/2016 75   09/11/2015 80       (goal LDL is <100)   AST (U/L)   Date Value   08/14/2017 14     ALT (U/L)   Date Value   08/14/2017 10     BUN (mg/dL)   Date Value   08/14/2017 8     BP Readings from Last 3 Encounters:   11/28/17 112/70   10/02/17 128/76   08/29/17 126/74          (goal 120/80)    Past Medical History:   Diagnosis Date    Allergic rhinitis     Arthritis     Asthma     GERD (gastroesophageal reflux disease)     Heart murmur     Hyperlipidemia 9/15/2015    Pet[facility] ([facility] [address]     Psoriasis     Pulmonary stenosis       Past Surgical History:   Procedure Laterality Date    LEEP  2008    WISDOM TOOTH EXTRACTION Right 1/8/16    WISDOM TOOTH EXTRACTION         Family History   Problem Relation Age of Onset    Diabetes Father     Other Mother      blood disorder    Other Maternal Aunt      blood disorder    Cancer Maternal Aunt      breast    Other Paternal Aunt      chiari malformation type 2     Cancer Paternal Aunt      ovarian cancer    Cancer Maternal Grandmother     Cancer Maternal Grandfather     Cancer Paternal Grandmother     High Blood Pressure Paternal Grandfather     Heart Disease Paternal Grandfather     Other Maternal Aunt      brain aneurysm       Social History   Substance Use Topics    Smoking status: Never Smoker    Smokeless tobacco: Never Used    Alcohol use 0.0 oz/week      Comment: social       Current Outpatient Prescriptions   Medication Sig Dispense Refill    fluticasone (FLONASE) 50 MCG/ACT nasal spray 2 sprays by Nasal route      norgestrel-ethinyl estradiol (CRYSELLE-28) 0.3-30 MG-MCG per tablet Take 1 tablet by mouth daily 28 tablet 11    topiramate (TOPAMAX) 100 MG tablet take 1 tablet by mouth twice a day  0    amitriptyline (ELAVIL) 75 MG tablet Take 1 tablet by mouth nightly 30 tablet 3    promethazine (PHENERGAN) 25 MG tablet TAKE 1 TABLET BY MOUTH DAILY IF NEEDED FOR NAUSEA AND VOMITING  0    atorvastatin (LIPITOR) 20 MG tablet take 1 tablet by mouth once daily 30 tablet 3    levothyroxine (SYNTHROID) 25 MCG tablet Take 1 tablet by mouth Daily 30 tablet 3    omeprazole (PRILOSEC) 20 MG delayed release capsule Take 1 capsule by mouth Daily 30 capsule 3    Cholecalciferol (VITAMIN D) 2000 units CAPS capsule Take 2,000 Units by mouth daily 30 capsule 5    albuterol sulfate HFA (PROAIR HFA) 108 (90 BASE) MCG/ACT inhaler Inhale 2 puffs into the lungs every 6 hours as needed for Wheezing 1 Inhaler 3    butalbital-acetaminophen-caffeine (FIORICET, ESGIC) -40 MG per tablet Take 1 tablet by mouth every 6 hours as needed for Headaches 40 tablet 0    ibuprofen (ADVIL;MOTRIN) 800 MG tablet Take 1 tablet by mouth every 8 hours as needed for Pain 60 tablet 5    diphenhydrAMINE (BENADRYL) 25 MG tablet Take 25 mg by mouth every 6 hours as needed for Itching       Current Facility-Administered Medications   Medication Dose Route Frequency Provider Last Rate Last Dose    ketorolac (TORADOL) injection 60 mg  60 mg Intramuscular Once Becki Unger PA-C         Allergies   Allergen Reactions    Food Hives     Oysters, clams, mussels    Bactrim [Sulfamethoxazole-Trimethoprim] Hives    Decadron [Dexamethasone]      Hives and hot flashes  Hives and hot flashes    Seasonal     Sulfamethoxazole-Trimethoprim Hives    Tetanus Toxoid, Adsorbed     Tetanus Toxoids        Health Maintenance   Topic Date Due    HIV screen  11/14/1997    Cervical cancer screen  05/14/2018    Flu vaccine  Completed    Pneumococcal med risk  Completed thyromegaly present. Cardiovascular: Normal rate, regular rhythm and normal heart sounds. Exam reveals no gallop and no friction rub. No murmur heard. Pulmonary/Chest: Effort normal and breath sounds normal. No stridor. No respiratory distress. She has no wheezes. She has no rales. She exhibits no tenderness. Abdominal: Soft. Bowel sounds are normal. She exhibits no distension. There is tenderness. There is no rebound and no guarding. Epigastric discomfort and L sided discomfort. No guarding rebound and regidity   Musculoskeletal: She exhibits no edema. Neurological: She is alert and oriented to person, place, and time. No cranial nerve deficit. Coordination and gait normal.   Skin: Skin is warm and dry. No rash noted. She is not diaphoretic. Psychiatric: She has a normal mood and affect. Her affect is not inappropriate. Nursing note and vitals reviewed. /70   Pulse 105   Temp 98.3 °F (36.8 °C) (Oral)   Ht 5' 2\" (1.575 m)   Wt 196 lb (88.9 kg)   LMP 11/17/2017   SpO2 98%   BMI 35.85 kg/m²     Assessment:      1. Rectal bleeding  CBC Auto Differential    Comprehensive Metabolic Panel    Amylase    Lipase    US GALLBLADDER Sabrina López MD, [facility]*    Blood Occult Stool #1    H. Pylori Antigen, Stool   2. Epigastric pain  CBC Auto Differential    Comprehensive Metabolic Panel    Amylase    Lipase    US GALLBLADDER Sabrina López MD, [facility]*    Blood Occult Stool #1    H. Pylori Antigen, Stool   3. Nausea  CBC Auto Differential    Comprehensive Metabolic Panel    Amylase    Lipase    US GALLBLADDER Sabrina López MD, [facility]*    Blood Occult Stool #1    H. Pylori Antigen, Stool   4. Nonintractable headache, unspecified chronicity pattern, unspecified headache type               Plan:      No Follow-up on file.     Orders Placed This Encounter   Procedures    US LPRPEKZPHOB HNK Standing Status:   Future     Standing Expiration Date:   11/28/2018    CBC Auto Differential     Standing Status:   Future     Standing Expiration Date:   11/28/2018    Comprehensive Metabolic Panel     Standing Status:   Future     Standing Expiration Date:   11/28/2018    Amylase     Standing Status:   Future     Standing Expiration Date:   11/28/2018    Lipase     Standing Status:   Future     Standing Expiration Date:   11/28/2018    Blood Occult Stool #1     Standing Status:   Future     Standing Expiration Date:   11/28/2018    H. Pylori Antigen, Stool    Melodie Martinez MD, [facility]*     Referral Priority:   Routine     Referral Type:   Consult for Advice and Opinion     Referral Reason:   Specialty Services Required     Referred to Provider:   Lance Norman MD     Requested Specialty:   Gastroenterology     Number of Visits Requested:   1     Orders Placed This Encounter   Medications    ketorolac (TORADOL) injection 60 mg     Labs from 8/21/17     Elevated WBC - Pt denies h/o. Pt does report family hx of blood disorder. Pt cannot recall name. Pt does donate plasma weekly. Pt will be encouraged to stop for now. Saw onch. Labs reviewed. Plans for repeat labs in 6 mo.      Abn TSH - Asx. T4 nl. TSH 6/11/15 wnl. 9/2015 wnl. Low 8/2017. Pt is sx. Start levothyroxine. Recheck labs 6 mo.      HLD - D/w pt. Diet and exercise encouraged. Will start med. Repeat 6 mo.      GERD - PPI rx.      Allergies - States cannot take antihistamines.       Asthma - No sx. Not on inhalers.      Murmur - H/o. No echo since 2008. Normal ECHO 6/3/15.      Pap - May 2014 wnl per pt. Seeing health department. H/o LEEP. Cervix erythematous today. Erythematous tissue seen in OS. Pt to f/u with GYN. PAP wnl 5/14/15      Peripheral edema - Elevation. Compression stockings. Non pitting      Preop - Dental extraction. EKG here reviewed. Echo from 2015 reviewed. Labs and cxr ordered.  D/w pt that she is ok for surgery. She is wanting to see cardiology b/c she wants to get established. Pt states h/o valve disease. Echo shows no significant valve disease.      Cephalgia - H/o HA since teens. L orbital/frontal. N/V. HA daily. Start elavil. Photosensitivity/aura. Neurointact. Pt denies pregnancy. Start elavil. Doing better. Still having HA. Increase dose. MRI neg. Was on Relief with fioricet. Seen in ED 8/2017. Still with HA. Start steroid burst.  Ketoralac today. Also on topamax 100mg BID through neuro. Seeing 2 neurologists through [facility].     Fatigue - Labs ordered. Pt states worse with heat. No depression, wt loss, night sweats or chills. Pt denies preg. Inflammatory markers elevated. Referral to rheum.      Vitamin d def - Replace and recheck 12 wk.      Rectal bleeding - Recurrent. Small quant. Pt is having abd pain. Neg CT recently. Referral to GI. Balanced diet and routine exercise encouraged.      Multivitamin with vitamin D daily encouraged.      Good sleep hygiene encouraged.      Dental exam q 6 mo.      Vision yearly.       Sunscreen encouraged.      Immunizations reviewed. Flu - 8/21/17    Prevnar 13 - 8/17/16    Pneumovax 23 - 8/21/17    Tdap - Pt states cannot take    Vision exam - Spring 2017     Patient given educational materials - see patient instructions. Discussed use, benefit, and side effects of prescribed medications. All patient questions answered. Pt voiced understanding. Reviewed health maintenance. Instructed to continue current medications, diet and exercise. Patient agreed with treatment plan. Follow up as directed.      Electronically signed by Serge Connors PA-C on 11/28/2017 at 9:43 AM

## 2017-11-30 ENCOUNTER — HOSPITAL ENCOUNTER (OUTPATIENT)
Age: 37
Setting detail: SPECIMEN
Discharge: HOME | End: 2017-11-30
Payer: MEDICARE

## 2017-11-30 DIAGNOSIS — R11.0 NAUSEA: ICD-10-CM

## 2017-11-30 DIAGNOSIS — K62.5 RECTAL BLEEDING: ICD-10-CM

## 2017-11-30 DIAGNOSIS — R10.13 EPIGASTRIC PAIN: ICD-10-CM

## 2017-11-30 LAB
ABSOLUTE EOS #: 0.06 K/UL (ref 0–0.44)
ABSOLUTE IMMATURE GRANULOCYTE: 0.03 K/UL (ref 0–0.3)
ABSOLUTE LYMPH #: 3.69 K/UL (ref 1.1–3.7)
ABSOLUTE MONO #: 0.97 K/UL (ref 0.1–1.2)
ALBUMIN SERPL-MCNC: 4.1 G/DL (ref 3.5–5.2)
ALBUMIN/GLOBULIN RATIO: 1.1 (ref 1–2.5)
ALP BLD-CCNC: 69 U/L (ref 35–104)
ALT SERPL-CCNC: 10 U/L (ref 5–33)
AMYLASE: 37 U/L (ref 28–100)
ANION GAP SERPL CALCULATED.3IONS-SCNC: 16 MMOL/L (ref 9–17)
AST SERPL-CCNC: 14 U/L
BASOPHILS # BLD: 0 % (ref 0–2)
BASOPHILS ABSOLUTE: 0.03 K/UL (ref 0–0.2)
BILIRUB SERPL-MCNC: 0.48 MG/DL (ref 0.3–1.2)
BUN BLDV-MCNC: 7 MG/DL (ref 6–20)
BUN/CREAT BLD: ABNORMAL (ref 9–20)
CALCIUM SERPL-MCNC: 8.8 MG/DL (ref 8.6–10.4)
CHLORIDE BLD-SCNC: 106 MMOL/L (ref 98–107)
CO2: 18 MMOL/L (ref 20–31)
CREAT SERPL-MCNC: 0.71 MG/DL (ref 0.5–0.9)
DIFFERENTIAL TYPE: ABNORMAL
EOSINOPHILS RELATIVE PERCENT: 1 % (ref 1–4)
GFR AFRICAN AMERICAN: >60 ML/MIN
GFR NON-AFRICAN AMERICAN: >60 ML/MIN
GFR SERPL CREATININE-BSD FRML MDRD: ABNORMAL ML/MIN/{1.73_M2}
GFR SERPL CREATININE-BSD FRML MDRD: ABNORMAL ML/MIN/{1.73_M2}
GLUCOSE BLD-MCNC: 75 MG/DL (ref 70–99)
HCT VFR BLD CALC: 46.9 % (ref 36.3–47.1)
HEMOGLOBIN: 14.5 G/DL (ref 11.9–15.1)
IMMATURE GRANULOCYTES: 0 %
LIPASE: 48 U/L (ref 13–60)
LYMPHOCYTES # BLD: 31 % (ref 24–43)
MCH RBC QN AUTO: 28.3 PG (ref 25.2–33.5)
MCHC RBC AUTO-ENTMCNC: 30.9 G/DL (ref 28.4–34.8)
MCV RBC AUTO: 91.6 FL (ref 82.6–102.9)
MONOCYTES # BLD: 8 % (ref 3–12)
PDW BLD-RTO: 13.4 % (ref 11.8–14.4)
PLATELET # BLD: 378 K/UL (ref 138–453)
PLATELET ESTIMATE: ABNORMAL
PMV BLD AUTO: 9.1 FL (ref 8.1–13.5)
POTASSIUM SERPL-SCNC: 3.9 MMOL/L (ref 3.7–5.3)
RBC # BLD: 5.12 M/UL (ref 3.95–5.11)
RBC # BLD: ABNORMAL 10*6/UL
SEG NEUTROPHILS: 60 % (ref 36–65)
SEGMENTED NEUTROPHILS ABSOLUTE COUNT: 7 K/UL (ref 1.5–8.1)
SODIUM BLD-SCNC: 140 MMOL/L (ref 135–144)
TOTAL PROTEIN: 7.8 G/DL (ref 6.4–8.3)
WBC # BLD: 11.8 K/UL (ref 3.5–11.3)
WBC # BLD: ABNORMAL 10*3/UL

## 2017-12-01 DIAGNOSIS — R11.0 NAUSEA: ICD-10-CM

## 2017-12-01 DIAGNOSIS — K62.5 RECTAL BLEEDING: ICD-10-CM

## 2017-12-01 DIAGNOSIS — R10.13 EPIGASTRIC PAIN: ICD-10-CM

## 2017-12-01 LAB
DATE, STOOL #1: NORMAL
DATE, STOOL #2: NORMAL
DATE, STOOL #3: NORMAL
HEMOCCULT SP1 STL QL: NEGATIVE
HEMOCCULT SP2 STL QL: NORMAL
HEMOCCULT SP3 STL QL: NORMAL
TIME, STOOL #1: NORMAL
TIME, STOOL #2: NORMAL
TIME, STOOL #3: NORMAL

## 2017-12-04 ENCOUNTER — HOSPITAL ENCOUNTER (OUTPATIENT)
Dept: NEUROLOGY | Age: 37
Discharge: HOME | End: 2017-12-04
Payer: MEDICARE

## 2017-12-04 DIAGNOSIS — R20.2 LEG PARESTHESIA: ICD-10-CM

## 2017-12-04 DIAGNOSIS — R20.2 ARM PARESTHESIA, RIGHT: ICD-10-CM

## 2017-12-04 PROCEDURE — 95910 NRV CNDJ TEST 7-8 STUDIES: CPT | Performed by: PHYSICAL MEDICINE & REHABILITATION

## 2017-12-04 PROCEDURE — 95886 MUSC TEST DONE W/N TEST COMP: CPT | Performed by: PHYSICAL MEDICINE & REHABILITATION

## 2017-12-04 RX ORDER — TOPIRAMATE 25 MG/1
25 TABLET ORAL 2 TIMES DAILY
COMMUNITY
End: 2018-03-09 | Stop reason: ALTCHOICE

## 2017-12-05 RX ORDER — AMITRIPTYLINE HYDROCHLORIDE 75 MG/1
75 TABLET, FILM COATED ORAL NIGHTLY
Qty: 30 TABLET | Refills: 3 | Status: SHIPPED | OUTPATIENT
Start: 2017-12-05 | End: 2018-02-27 | Stop reason: SDUPTHER

## 2017-12-08 ENCOUNTER — OFFICE VISIT (OUTPATIENT)
Dept: FAMILY MEDICINE CLINIC | Age: 37
End: 2017-12-08
Payer: MEDICARE

## 2017-12-08 VITALS
TEMPERATURE: 98.4 F | WEIGHT: 195.1 LBS | OXYGEN SATURATION: 99 % | SYSTOLIC BLOOD PRESSURE: 112 MMHG | HEART RATE: 102 BPM | RESPIRATION RATE: 18 BRPM | DIASTOLIC BLOOD PRESSURE: 84 MMHG | HEIGHT: 62 IN | BODY MASS INDEX: 35.9 KG/M2

## 2017-12-08 DIAGNOSIS — R10.84 GENERALIZED ABDOMINAL PAIN: Primary | ICD-10-CM

## 2017-12-08 DIAGNOSIS — J35.1 ENLARGED TONSILS: ICD-10-CM

## 2017-12-08 PROCEDURE — 1036F TOBACCO NON-USER: CPT | Performed by: PHYSICIAN ASSISTANT

## 2017-12-08 PROCEDURE — G8417 CALC BMI ABV UP PARAM F/U: HCPCS | Performed by: PHYSICIAN ASSISTANT

## 2017-12-08 PROCEDURE — G8427 DOCREV CUR MEDS BY ELIG CLIN: HCPCS | Performed by: PHYSICIAN ASSISTANT

## 2017-12-08 PROCEDURE — G8484 FLU IMMUNIZE NO ADMIN: HCPCS | Performed by: PHYSICIAN ASSISTANT

## 2017-12-08 PROCEDURE — 99213 OFFICE O/P EST LOW 20 MIN: CPT | Performed by: PHYSICIAN ASSISTANT

## 2017-12-08 RX ORDER — AMOXICILLIN AND CLAVULANATE POTASSIUM 875; 125 MG/1; MG/1
1 TABLET, FILM COATED ORAL
COMMUNITY
Start: 2017-12-04 | End: 2017-12-09

## 2017-12-08 ASSESSMENT — ENCOUNTER SYMPTOMS
COUGH: 0
ABDOMINAL PAIN: 1
EYE REDNESS: 0
RECTAL PAIN: 0
EYE DISCHARGE: 0
DIFFICULTY BREATHING: 0
HEARTBURN: 0
CHOKING: 0
BLURRED VISION: 0
BELCHING: 0
HEMATEMESIS: 0
NAUSEA: 0
DIARRHEA: 0
HEMATOCHEZIA: 1
SORE THROAT: 0
EYE PAIN: 0
BACK PAIN: 0
GLOBUS SENSATION: 0
RHINORRHEA: 0
PHOTOPHOBIA: 1
HOARSE VOICE: 0
CONSTIPATION: 0
CHEST TIGHTNESS: 0
EYE WATERING: 0
VOMITING: 0
FACIAL SWEATING: 0
EYE ITCHING: 0
SHORTNESS OF BREATH: 0
SINUS PRESSURE: 0

## 2017-12-08 NOTE — PROGRESS NOTES
[facility]  [address] [place] [id number]  Dept: [phone]  Dept Fax: [phone]    Melinda Martinez is a 30 y.o. female who presents today for her medical conditions/complaints as noted below. Melinda Martinez is c/o of   Chief Complaint   Patient presents with    Abdominal Pain     Pt states she has not been feeling good for a week and also having some vomitting    Fever     pt states for the past week she has fever as high as 101 in the past week     Visit Information    Have you changed or started any medications since your last visit including any over-the-counter medicines, vitamins, or herbal medicines? no   Have you stopped taking any of your medications? Is so, why? -  no  Are you having any side effects from any of your medications? - no    Have you seen any other physician or provider since your last visit?  no   Have you had any other diagnostic tests since your last visit?  no   Have you been seen in the emergency room and/or had an admission in a hospital since we last saw you?  no   Have you had your routine dental cleaning in the past 6 months?  yes -      Do you have an active MyChart account? If no, what is the barrier? No:     Patient Care Team:  Dipti Aguila PA-C as PCP - General (Physician Assistant)  Gael Soulier, MD as Consulting Physician (Hematology and Oncology)  Amber Mcdermott DO as Obstetrician (Obstetrics & Gynecology)    Medical History Review  Past Medical, Family, and Social History reviewed and does contribute to the patient presenting condition    Health Maintenance   Topic Date Due    HIV screen  11/14/1997    Cervical cancer screen  05/14/2018    Flu vaccine  Completed    Pneumococcal med risk  Completed                 HPI:     Rectal Bleeding    The current episode started more than 1 week ago. The onset was sudden. The pain is mild. The stool is described as soft.  Associated symptoms include a fever and abdominal pain. Pertinent negatives include no anorexia, no diarrhea, no hematemesis, no hemorrhoids, no nausea, no rectal pain, no vomiting, no hematuria, no vaginal bleeding, no vaginal discharge, no chest pain, no headaches, no coughing, no difficulty breathing and no rash. Migraine    This is a chronic problem. The pain is at a severity of 7/10. The pain is moderate. Associated symptoms include abdominal pain, a fever, phonophobia and photophobia. Pertinent negatives include no abnormal behavior, anorexia, back pain, blurred vision, coughing, dizziness, drainage, ear pain, eye pain, eye redness, eye watering, facial sweating, hearing loss, insomnia, loss of balance, muscle aches, nausea, neck pain, numbness, rhinorrhea, sinus pressure, sore throat, vomiting, weakness or weight loss. There is no history of obesity. Hyperlipidemia   This is a chronic problem. The current episode started more than 1 year ago. She has no history of chronic renal disease, diabetes, hypothyroidism, liver disease, obesity or nephrotic syndrome. Pertinent negatives include no chest pain, focal sensory loss, focal weakness, leg pain, myalgias or shortness of breath. The current treatment provides moderate improvement of lipids. Risk factors for coronary artery disease include obesity and dyslipidemia. Gastroesophageal Reflux   She complains of abdominal pain. She reports no belching, no chest pain, no choking, no coughing, no early satiety, no globus sensation, no heartburn, no hoarse voice, no nausea or no sore throat. This is a chronic problem. Associated symptoms include fatigue. Pertinent negatives include no anemia, melena, muscle weakness, orthopnea or weight loss. Past procedures do not include an abdominal ultrasound or an EGD. Past invasive treatments do not include gastroplasty. Headache    This is a chronic problem. The pain quality is similar to prior headaches.  Associated symptoms include abdominal pain, a fever, Psychiatric/Behavioral: The patient does not have insomnia. All other systems reviewed and are negative. Objective:     Physical Exam   Constitutional: She is oriented to person, place, and time. She appears well-developed and well-nourished. No distress. /72  Pulse 90  Temp 98.6 °F (37 °C) (Oral)   Ht 5' 2\" (1.575 m)  Wt 196 lb 12.8 oz (89.3 kg)  LMP 04/07/2017 (Exact Date)  SpO2 96%  BMI 36 kg/m2     HENT:   Head: Normocephalic and atraumatic. Right Ear: External ear normal.   Left Ear: External ear normal.   Nose: Nose normal.   Mouth/Throat: Oropharynx is clear and moist.   Eyes: Conjunctivae and EOM are normal. Pupils are equal, round, and reactive to light. Right eye exhibits no discharge. Left eye exhibits no discharge. No scleral icterus. Neck: Normal range of motion. Neck supple. No tracheal deviation present. No thyromegaly present. Cardiovascular: Normal rate, regular rhythm and normal heart sounds. Exam reveals no gallop and no friction rub. No murmur heard. Pulmonary/Chest: Effort normal and breath sounds normal. No stridor. No respiratory distress. She has no wheezes. She has no rales. She exhibits no tenderness. Abdominal: Soft. Bowel sounds are normal. She exhibits no distension. There is tenderness. There is no rebound and no guarding. Epigastric discomfort and L sided discomfort. No guarding rebound and regidity   Musculoskeletal: She exhibits no edema. Neurological: She is alert and oriented to person, place, and time. No cranial nerve deficit. Coordination and gait normal.   Skin: Skin is warm and dry. No rash noted. She is not diaphoretic. Psychiatric: She has a normal mood and affect. Her affect is not inappropriate. Nursing note and vitals reviewed. /84   Pulse 102   Temp 98.4 °F (36.9 °C) (Oral)   Resp 18   Ht 5' 2\" (1.575 m)   Wt 195 lb 1.6 oz (88.5 kg)   LMP 11/17/2017   SpO2 99%   BMI 35.68 kg/m²     Assessment:      1. recheck 12 wk.      Rectal bleeding - Recurrent. Small quant. Pt is having abd pain. Neg CT recently. Referral to GI. Balanced diet and routine exercise encouraged.      Multivitamin with vitamin D daily encouraged.      Good sleep hygiene encouraged.      Dental exam q 6 mo.      Vision yearly.       Sunscreen encouraged.      Immunizations reviewed. Flu - 8/21/17    Prevnar 13 - 8/17/16    Pneumovax 23 - 8/21/17    Tdap - Pt states cannot take    Vision exam - Spring 2017     Patient given educational materials - see patient instructions. Discussed use, benefit, and side effects of prescribed medications. All patient questions answered. Pt voiced understanding. Reviewed health maintenance. Instructed to continue current medications, diet and exercise. Patient agreed with treatment plan. Follow up as directed.      Electronically signed by Martha Jo PA-C on 12/8/2017 at 8:48 AM

## 2017-12-08 NOTE — LETTER
[place] [address] [place] [place] [address]  Phone: [phone]  Fax: [phone]    Imelda Frias PA-C        December 8, 2017     Patient: Jeremiah Lopez   YOB: 1980   Date of Visit: 12/8/2017       To Whom it May Concern:    Jeremiah Lopez was seen in my clinic on 12/8/2017. She may return to work on 12/11/17. If you have any questions or concerns, please don't hesitate to call.     Sincerely,         Imelda Frias PA-C

## 2017-12-08 NOTE — PROGRESS NOTES
Visit Information    Have you changed or started any medications since your last visit including any over-the-counter medicines, vitamins, or herbal medicines? yes - Augmentin    Have you stopped taking any of your medications? Is so, why? -  no  Are you having any side effects from any of your medications? - no    Have you seen any other physician or provider since your last visit?  no   Have you had any other diagnostic tests since your last visit? yes - flu swab, throat culture   Have you been seen in the emergency room and/or had an admission in a hospital since we last saw you? Yes- [facility]  Have you had your routine dental cleaning in the past 6 months?  yes - 9/2017     Do you have an active MyChart account? If no, what is the barrier?   Yes    Patient Care Team:  Romayne Brasil as PCP - General (Physician Assistant)  Mai Webb MD as Consulting Physician (Hematology and Oncology)  Javy Diallo DO as Obstetrician (Obstetrics & Gynecology)    Medical History Review  Past Medical, Family, and Social History reviewed and does not contribute to the patient presenting condition    Health Maintenance   Topic Date Due    HIV screen  11/14/1997    Cervical cancer screen  05/14/2018    Flu vaccine  Completed    Pneumococcal med risk  Completed

## 2017-12-11 ENCOUNTER — HOSPITAL ENCOUNTER (OUTPATIENT)
Dept: ULTRASOUND IMAGING | Facility: CLINIC | Age: 37
Discharge: HOME | End: 2017-12-11
Payer: MEDICARE

## 2017-12-11 ENCOUNTER — OFFICE VISIT (OUTPATIENT)
Dept: GASTROENTEROLOGY | Age: 37
End: 2017-12-11
Payer: MEDICARE

## 2017-12-11 VITALS
HEIGHT: 62 IN | WEIGHT: 199 LBS | SYSTOLIC BLOOD PRESSURE: 105 MMHG | OXYGEN SATURATION: 98 % | DIASTOLIC BLOOD PRESSURE: 76 MMHG | TEMPERATURE: 97.2 F | HEART RATE: 102 BPM | BODY MASS INDEX: 36.62 KG/M2

## 2017-12-11 DIAGNOSIS — K62.5 RECTAL BLEEDING: ICD-10-CM

## 2017-12-11 DIAGNOSIS — R11.2 NAUSEA AND VOMITING, INTRACTABILITY OF VOMITING NOT SPECIFIED, UNSPECIFIED VOMITING TYPE: ICD-10-CM

## 2017-12-11 DIAGNOSIS — R11.0 NAUSEA: ICD-10-CM

## 2017-12-11 DIAGNOSIS — R93.2 ABNORMAL ULTRASOUND OF GALLBLADDER: ICD-10-CM

## 2017-12-11 DIAGNOSIS — R10.13 EPIGASTRIC PAIN: ICD-10-CM

## 2017-12-11 DIAGNOSIS — R10.84 GENERALIZED ABDOMINAL PAIN: Primary | ICD-10-CM

## 2017-12-11 PROCEDURE — G8484 FLU IMMUNIZE NO ADMIN: HCPCS | Performed by: INTERNAL MEDICINE

## 2017-12-11 PROCEDURE — 76705 ECHO EXAM OF ABDOMEN: CPT

## 2017-12-11 PROCEDURE — G8427 DOCREV CUR MEDS BY ELIG CLIN: HCPCS | Performed by: INTERNAL MEDICINE

## 2017-12-11 PROCEDURE — 99244 OFF/OP CNSLTJ NEW/EST MOD 40: CPT | Performed by: INTERNAL MEDICINE

## 2017-12-11 PROCEDURE — G8417 CALC BMI ABV UP PARAM F/U: HCPCS | Performed by: INTERNAL MEDICINE

## 2017-12-11 RX ORDER — POLYETHYLENE GLYCOL 3350 17 G/17G
POWDER, FOR SOLUTION ORAL
Qty: 255 G | Refills: 0 | Status: SHIPPED | OUTPATIENT
Start: 2017-12-11 | End: 2018-01-10

## 2017-12-11 ASSESSMENT — ENCOUNTER SYMPTOMS
RECTAL PAIN: 0
ABDOMINAL PAIN: 1
VOMITING: 1
CONSTIPATION: 0
SINUS PAIN: 0
SHORTNESS OF BREATH: 1
VOICE CHANGE: 0
ABDOMINAL DISTENTION: 0
ANAL BLEEDING: 0
BACK PAIN: 1
DIARRHEA: 0
SORE THROAT: 0
BLOOD IN STOOL: 1
NAUSEA: 1
TROUBLE SWALLOWING: 1
WHEEZING: 0
FACIAL SWELLING: 0
SINUS PRESSURE: 0
RHINORRHEA: 0
COUGH: 0

## 2017-12-11 NOTE — PROGRESS NOTES
Subjective:      Patient ID: Elaina Mejia is a 30 y.o. female. HPI   Dr. Baldo Hernandez PA-C has requested that I see Elaina Mejia for a consult for   1. Generalized abdominal pain    2. Abnormal ultrasound of gallbladder    3. Nausea and vomiting, intractability of vomiting not specified, unspecified vomiting type    4. Rectal bleeding     . Elania Mejia, 42-year-old female patient seen in the office with the symptoms of rectal bleeding, abdominal pain. The pain appears to be mostly in the upper abdomen, crampy in nature. These symptoms appears to be intermittent. She has more symptoms in the postprandial state. She does have nausea and intermittent emesis. Also has GERD symptoms. No dysphagia. No weight loss. Also has loose bowels including diarrhea. She complains of rectal bleeding in the last couple of weeks. Blood appears to be small to moderate amount. No clots. She states that she has predominantly diarrhea. However, on rare occasion she does have constipation as well. Denies food allergies. She does not strain at bowel movements. Patient does take NSAIDs. No history of anticoagulation therapy. Patient appears to have stressful lifestyle. She has been on Elavil. This is not helping her. Recently patient had ultrasound of the gallbladder done which revealed mild thickening of gallbladder wall. However no gallstones seen. Her transaminases, CBC within normal limits. Lipase is within normal limits as well. Elavil    Past Medical, Family, and Social History reviewed and does contribute to the patient presenting condition. patient\"s PMH/PSH,SH,PSYCH hx, MEDs, ALLERGIES, and ROS was all reviewed and updated ion the appropriate sections      Review of Systems   Constitutional: Positive for appetite change (fair), chills, fatigue and fever (off and on). Negative for activity change, diaphoresis and unexpected weight change.    HENT: Positive for trouble swallowing (sometimes, because of enlarged tonsils). Negative for congestion, dental problem, drooling, ear discharge, ear pain, facial swelling, hearing loss, mouth sores, nosebleeds, postnasal drip, rhinorrhea, sinus pain, sinus pressure, sneezing, sore throat, tinnitus and voice change. Eyes: Positive for visual disturbance (lasses). Respiratory: Positive for shortness of breath (sometimes, she has asthma). Negative for cough and wheezing. Cardiovascular: Negative. Negative for chest pain, palpitations and leg swelling. Gastrointestinal: Positive for abdominal pain (generalized), blood in stool, nausea and vomiting. Negative for abdominal distention, anal bleeding, constipation, diarrhea and rectal pain. Endocrine: Negative. Negative for polydipsia, polyphagia and polyuria. Genitourinary: Negative. Negative for difficulty urinating, dysuria, menstrual problem, vaginal bleeding and vaginal discharge. Musculoskeletal: Positive for arthralgias and back pain. Negative for gait problem, neck pain and neck stiffness. Skin: Negative. Allergic/Immunologic: Positive for environmental allergies and food allergies (oysters, clams mussels). Negative for immunocompromised state. Neurological: Positive for seizures and numbness (extemities). Negative for dizziness, tremors, syncope, facial asymmetry, speech difficulty, weakness, light-headedness and headaches. Hematological: Negative. Negative for adenopathy. Does not bruise/bleed easily. Psychiatric/Behavioral: Negative for sleep disturbance. The patient is nervous/anxious. Objective:   Physical Exam   Constitutional: She is oriented to person, place, and time. She appears well-developed and well-nourished. HENT:   Head: Normocephalic and atraumatic. No oral lesions   Eyes: Conjunctivae are normal. Pupils are equal, round, and reactive to light. No scleral icterus. Neck: Normal range of motion. Neck supple.  No hepatojugular reflux and no JVD present. No tracheal deviation present. No thyromegaly present. Cardiovascular: Normal rate, regular rhythm, normal heart sounds and intact distal pulses. Pulmonary/Chest: Effort normal and breath sounds normal. No respiratory distress. She has no wheezes. She has no rales. Abdominal: Soft. Bowel sounds are normal. She exhibits no distension, no ascites and no mass. There is no hepatomegaly. There is no tenderness. There is no rebound. No hernia. Musculoskeletal: She exhibits no edema or tenderness. No joint swelling   Lymphadenopathy:     She has no cervical adenopathy. Neurological: She is alert and oriented to person, place, and time. No cranial nerve deficit. Skin: Skin is warm. No bruising, no ecchymosis and no rash noted. No erythema. Psychiatric: Thought content normal.   Nursing note and vitals reviewed. Assessment:      1. Generalized abdominal pain     2. Abnormal ultrasound of gallbladder     3. Nausea and vomiting, intractability of vomiting not specified, unspecified vomiting type  EGD   4. Rectal bleeding  COLONOSCOPY W/ OR W/O BIOPSY           Plan:      Patient appears to have nonspecific symptoms. I'm not clear whether she has diarrhea predominant IBS. Also she has dyspepsia and GERD. Patient is advised to continue PPI therapy. Will watch her in the next 2-3 weeks. If she continued to have symptoms she may need EGD and colonoscopy. Basing on her history and workup done so far I do not think that she has gallbladder disease. After discussion, patient understood and agreed.       The Endoscopic procedure was explained to the patient in detail  The prep and NPO were explained  All the Risks, Benefits, and Alternatives were explained  Risk of Bleeding, Perforation and Cardio Respiratory risks were explained  her questions were answered  The procedure has been scheduled with the  in the office  Patient was asked to give us a call for any

## 2017-12-13 ENCOUNTER — OFFICE VISIT (OUTPATIENT)
Dept: FAMILY MEDICINE CLINIC | Age: 37
End: 2017-12-13
Payer: MEDICARE

## 2017-12-13 VITALS
DIASTOLIC BLOOD PRESSURE: 74 MMHG | WEIGHT: 193.1 LBS | TEMPERATURE: 98.1 F | HEART RATE: 100 BPM | SYSTOLIC BLOOD PRESSURE: 118 MMHG | OXYGEN SATURATION: 98 % | HEIGHT: 62 IN | BODY MASS INDEX: 35.53 KG/M2

## 2017-12-13 DIAGNOSIS — R10.9 ABDOMINAL PAIN, UNSPECIFIED ABDOMINAL LOCATION: ICD-10-CM

## 2017-12-13 DIAGNOSIS — R93.2 ABNORMAL GALLBLADDER ULTRASOUND: ICD-10-CM

## 2017-12-13 DIAGNOSIS — R11.2 NAUSEA AND VOMITING, INTRACTABILITY OF VOMITING NOT SPECIFIED, UNSPECIFIED VOMITING TYPE: Primary | ICD-10-CM

## 2017-12-13 DIAGNOSIS — R19.7 DIARRHEA, UNSPECIFIED TYPE: ICD-10-CM

## 2017-12-13 PROCEDURE — 99213 OFFICE O/P EST LOW 20 MIN: CPT | Performed by: PHYSICIAN ASSISTANT

## 2017-12-13 PROCEDURE — G8484 FLU IMMUNIZE NO ADMIN: HCPCS | Performed by: PHYSICIAN ASSISTANT

## 2017-12-13 PROCEDURE — G8427 DOCREV CUR MEDS BY ELIG CLIN: HCPCS | Performed by: PHYSICIAN ASSISTANT

## 2017-12-13 PROCEDURE — 1036F TOBACCO NON-USER: CPT | Performed by: PHYSICIAN ASSISTANT

## 2017-12-13 PROCEDURE — G8417 CALC BMI ABV UP PARAM F/U: HCPCS | Performed by: PHYSICIAN ASSISTANT

## 2017-12-13 RX ORDER — IBUPROFEN 200 MG/1
TABLET ORAL
Refills: 0 | COMMUNITY
Start: 2017-12-11 | End: 2018-02-27 | Stop reason: ALTCHOICE

## 2017-12-13 RX ORDER — DICYCLOMINE HCL 20 MG
20 TABLET ORAL 3 TIMES DAILY PRN
Qty: 20 TABLET | Refills: 0 | Status: SHIPPED | OUTPATIENT
Start: 2017-12-13 | End: 2018-09-10

## 2017-12-13 ASSESSMENT — ENCOUNTER SYMPTOMS
ABDOMINAL PAIN: 1
NAUSEA: 1
RHINORRHEA: 0
SORE THROAT: 0
SINUS PRESSURE: 0
SHORTNESS OF BREATH: 0
COUGH: 0
EYE ITCHING: 0
DIARRHEA: 1
CHEST TIGHTNESS: 0
VOMITING: 1
EYE DISCHARGE: 0

## 2017-12-13 NOTE — PROGRESS NOTES
[facility]  [address] [place] [id number]  Dept: [phone]  Dept Fax: [phone]    Izabel Man is a 30 y.o. female who presents today for her medical conditions/complaints as noted below. Izabel Man is c/o of   Chief Complaint   Patient presents with    Emesis    Discuss Labs     Visit Information    Have you changed or started any medications since your last visit including any over-the-counter medicines, vitamins, or herbal medicines? no   Have you stopped taking any of your medications? Is so, why? -  no  Are you having any side effects from any of your medications? - no    Have you seen any other physician or provider since your last visit?  no   Have you had any other diagnostic tests since your last visit?  no   Have you been seen in the emergency room and/or had an admission in a hospital since we last saw you?  no   Have you had your routine dental cleaning in the past 6 months?  yes -      Do you have an active MyChart account? If no, what is the barrier? No:     Patient Care Team:  John Ge PA-C as PCP - General (Physician Assistant)  Reji Lazaro MD as Consulting Physician (Hematology and Oncology)  Maribel Marsh DO as Obstetrician (Obstetrics & Gynecology)    Medical History Review  Past Medical, Family, and Social History reviewed and does contribute to the patient presenting condition    Health Maintenance   Topic Date Due    HIV screen  11/14/1997    Cervical cancer screen  05/14/2018    Flu vaccine  Completed    Pneumococcal med risk  Completed                 HPI:     Rectal Bleeding    The current episode started more than 1 week ago. The onset was sudden. The pain is mild. The stool is described as soft. Associated symptoms include a fever and abdominal pain.  Pertinent negatives include no anorexia, no diarrhea, no hematemesis, no hemorrhoids, no nausea, no rectal pain, no vomiting, no hematuria, no vaginal redness, eye watering, facial sweating, hearing loss, insomnia, loss of balance, muscle aches, nausea, neck pain, numbness, rhinorrhea, sinus pressure, sore throat, vomiting, weakness or weight loss. There is no history of obesity. Abdominal Pain   This is a recurrent problem. The pain is located in the generalized abdominal region. The pain is at a severity of 6/10. The pain is moderate. Associated symptoms include a fever and hematochezia. Pertinent negatives include no anorexia, arthralgias, belching, constipation, diarrhea, dysuria, frequency, headaches, hematuria, melena, myalgias, nausea, vomiting or weight loss. Her past medical history is significant for GERD. Fever    Associated symptoms include abdominal pain. Pertinent negatives include no chest pain, congestion, coughing, diarrhea, ear pain, headaches, muscle aches, nausea, rash, sore throat, urinary pain or vomiting.        No results found for: LABA1C          ( goal A1C is < 7)   No results found for: LABMICR  LDL Cholesterol (mg/dL)   Date Value   08/14/2017 76   08/18/2016 75   09/11/2015 80       (goal LDL is <100)   AST (U/L)   Date Value   11/30/2017 14     ALT (U/L)   Date Value   11/30/2017 10     BUN (mg/dL)   Date Value   11/30/2017 7     BP Readings from Last 3 Encounters:   12/13/17 118/74   12/11/17 105/76   12/08/17 112/84          (goal 120/80)    Past Medical History:   Diagnosis Date    Allergic rhinitis     Arthritis     Asthma     GERD (gastroesophageal reflux disease)     Heart murmur     Hyperlipidemia 9/15/2015    Petit mal ([facility] [address]     Psoriasis     Pulmonary stenosis       Past Surgical History:   Procedure Laterality Date    LEEP  2008    WISDOM TOOTH EXTRACTION Right 1/8/16    WISDOM TOOTH EXTRACTION         Family History   Problem Relation Age of Onset    Diabetes Father     Other Mother      blood disorder    Other Maternal Aunt      blood disorder    Cancer Maternal Aunt      breast    Other Paternal Aunt  butalbital-acetaminophen-caffeine (FIORICET, ESGIC) -40 MG per tablet Take 1 tablet by mouth every 6 hours as needed for Headaches 40 tablet 0    ibuprofen (ADVIL;MOTRIN) 800 MG tablet Take 1 tablet by mouth every 8 hours as needed for Pain 60 tablet 5    diphenhydrAMINE (BENADRYL) 25 MG tablet Take 25 mg by mouth every 6 hours as needed for Itching       No current facility-administered medications for this visit. Allergies   Allergen Reactions    Food Hives     Oysters, clams, mussels    Bactrim [Sulfamethoxazole-Trimethoprim] Hives    Decadron [Dexamethasone]      Hives and hot flashes  Hives and hot flashes    Seasonal     Sulfamethoxazole-Trimethoprim Hives    Tetanus Toxoid, Adsorbed     Tetanus Toxoids        Health Maintenance   Topic Date Due    HIV screen  11/14/1997    Cervical cancer screen  05/14/2018    Flu vaccine  Completed    Pneumococcal med risk  Completed       Subjective:      Review of Systems   Constitutional: Positive for fatigue and fever. Negative for chills, diaphoresis and weight loss. HENT: Negative for congestion, ear discharge, ear pain, hearing loss, hoarse voice, postnasal drip, rhinorrhea, sinus pressure and sore throat. Eyes: Positive for photophobia. Negative for blurred vision, pain, discharge, redness and itching. Respiratory: Negative for cough, choking, chest tightness and shortness of breath. Cardiovascular: Negative for chest pain, palpitations and leg swelling. Gastrointestinal: Positive for abdominal pain and hematochezia. Negative for anorexia, constipation, diarrhea, heartburn, hematemesis, hemorrhoids, melena, nausea, rectal pain and vomiting. Genitourinary: Negative for dysuria, frequency, hematuria, vaginal bleeding and vaginal discharge. Musculoskeletal: Negative for arthralgias, back pain, myalgias, muscle weakness, neck pain and neck stiffness. Skin: Negative for rash.    Neurological: Negative for dizziness, focal weakness, weakness, light-headedness, numbness, headaches and loss of balance. Psychiatric/Behavioral: The patient does not have insomnia. All other systems reviewed and are negative. Objective:     Physical Exam   Constitutional: She is oriented to person, place, and time. She appears well-developed and well-nourished. No distress. /72  Pulse 90  Temp 98.6 °F (37 °C) (Oral)   Ht 5' 2\" (1.575 m)  Wt 196 lb 12.8 oz (89.3 kg)  LMP 04/07/2017 (Exact Date)  SpO2 96%  BMI 36 kg/m2     HENT:   Head: Normocephalic and atraumatic. Right Ear: External ear normal.   Left Ear: External ear normal.   Nose: Nose normal.   Mouth/Throat: Oropharynx is clear and moist.   Eyes: Conjunctivae and EOM are normal. Pupils are equal, round, and reactive to light. Right eye exhibits no discharge. Left eye exhibits no discharge. No scleral icterus. Neck: Normal range of motion. Neck supple. No tracheal deviation present. No thyromegaly present. Cardiovascular: Normal rate, regular rhythm and normal heart sounds. Exam reveals no gallop and no friction rub. No murmur heard. Pulmonary/Chest: Effort normal and breath sounds normal. No stridor. No respiratory distress. She has no wheezes. She has no rales. She exhibits no tenderness. Abdominal: Soft. Bowel sounds are normal. She exhibits no distension. There is tenderness. There is no rebound and no guarding. Epigastric discomfort and L sided discomfort. No guarding rebound and regidity   Musculoskeletal: She exhibits no edema. Neurological: She is alert and oriented to person, place, and time. No cranial nerve deficit. Coordination and gait normal.   Skin: Skin is warm and dry. No rash noted. She is not diaphoretic. Psychiatric: She has a normal mood and affect. Her affect is not inappropriate. Nursing note and vitals reviewed.     /74   Pulse 100   Temp 98.1 °F (36.7 °C) (Oral)   Ht 5' 2\" (1.575 m)   Wt 193 lb 1.6 oz (87.6 kg)   LMP 11/17/2017   SpO2 98%   BMI 35.32 kg/m²     Assessment:      1. Nausea and vomiting, intractability of vomiting not specified, unspecified vomiting type  NM Hepatobiliary   2. Abdominal pain, unspecified abdominal location  NM Hepatobiliary   3. Diarrhea, unspecified type  NM Hepatobiliary   4. Abnormal gallbladder ultrasound  NM Hepatobiliary             Plan:      No Follow-up on file. Orders Placed This Encounter   Procedures    NM Hepatobiliary     Standing Status:   Future     Standing Expiration Date:   12/13/2018     Order Specific Question:   Reason for exam:     Answer:   pain     Orders Placed This Encounter   Medications    dicyclomine (BENTYL) 20 MG tablet     Sig: Take 1 tablet by mouth 3 times daily as needed (abd pain)     Dispense:  20 tablet     Refill:  0     Labs from 8/21/17     Elevated WBC - Pt denies h/o. Pt does report family hx of blood disorder. Pt cannot recall name. Pt does donate plasma weekly. Pt will be encouraged to stop for now. Saw onch. Labs reviewed. Plans for repeat labs in 6 mo.      Abn TSH - Asx. T4 nl. TSH 6/11/15 wnl. 9/2015 wnl. Low 8/2017. Pt is sx. Start levothyroxine. Recheck labs 6 mo.      HLD - D/w pt. Diet and exercise encouraged. Will start med. Repeat 6 mo.      GERD - PPI rx.      Allergies - States cannot take antihistamines.       Asthma - No sx. Not on inhalers.      Murmur - H/o. No echo since 2008. Normal ECHO 6/3/15.      Pap - May 2014 wnl per pt. Seeing health department. H/o LEEP. Cervix erythematous today. Erythematous tissue seen in OS. Pt to f/u with GYN. PAP wnl 5/14/15      Peripheral edema - Elevation. Compression stockings. Non pitting      Preop - Dental extraction. EKG here reviewed. Echo from 2015 reviewed. Labs and cxr ordered. D/w pt that she is ok for surgery. She is wanting to see cardiology b/c she wants to get established. Pt states h/o valve disease. Echo shows no significant valve disease.      Cephalgia - H/o HA since teens.  L orbital/frontal. N/V. HA daily. Start elavil. Photosensitivity/aura. Neurointact. Pt denies pregnancy. Start elavil. Doing better. Still having HA. Increase dose. MRI neg. Was on Relief with fioricet. Seen in ED 8/2017. Still with HA. Start steroid burst.  Ketoralac today. Also on topamax 100mg BID through neuro. Seeing 2 neurologists through [facility].     Fatigue - Labs ordered. Pt states worse with heat. No depression, wt loss, night sweats or chills. Pt denies preg. Inflammatory markers elevated. Referral to rheum.      Vitamin d def - Replace and recheck 12 wk.      Rectal bleeding - Recurrent. Small quant. Pt is having abd pain. Neg CT recently. Referral to GI. Balanced diet and routine exercise encouraged.      Multivitamin with vitamin D daily encouraged.      Good sleep hygiene encouraged.      Dental exam q 6 mo.      Vision yearly.       Sunscreen encouraged.      Immunizations reviewed. Flu - 8/21/17    Prevnar 13 - 8/17/16    Pneumovax 23 - 8/21/17    Tdap - Pt states cannot take    Vision exam - Spring 2017     Patient given educational materials - see patient instructions. Discussed use, benefit, and side effects of prescribed medications. All patient questions answered. Pt voiced understanding. Reviewed health maintenance. Instructed to continue current medications, diet and exercise. Patient agreed with treatment plan. Follow up as directed. Electronically signed by Romulo Lam PA-C on 12/13/2017 at 1:56 PM  [id number] [place] [address]  Dept: [phone]  Dept Fax: [phone]    Ld Everett is a 30 y.o. female who presents today for her medical conditions/complaints as noted below. Ld Everett is c/o of   Chief Complaint   Patient presents with    Emesis    Discuss Labs         HPI:     Abdominal Pain   This is a recurrent problem. The current episode started more than 1 month ago.  The pain is located in the epigastric region. The pain is at a severity of 6/10. The pain is moderate. Associated symptoms include diarrhea, nausea and vomiting. Pertinent negatives include no dysuria, fever, frequency or headaches.        No results found for: LABA1C          ( goal A1C is < 7)   No results found for: LABMICR  LDL Cholesterol (mg/dL)   Date Value   08/14/2017 76   08/18/2016 75   09/11/2015 80       (goal LDL is <100)   AST (U/L)   Date Value   11/30/2017 14     ALT (U/L)   Date Value   11/30/2017 10     BUN (mg/dL)   Date Value   11/30/2017 7     BP Readings from Last 3 Encounters:   12/13/17 118/74   12/11/17 105/76   12/08/17 112/84          (goal 120/80)    Past Medical History:   Diagnosis Date    Allergic rhinitis     Arthritis     Asthma     GERD (gastroesophageal reflux disease)     Heart murmur     Hyperlipidemia 9/15/2015    Petit mal ([address]     Psoriasis     Pulmonary stenosis       Past Surgical History:   Procedure Laterality Date    LEEP  2008    WISDOM TOOTH EXTRACTION Right 1/8/16    WISDOM TOOTH EXTRACTION         Family History   Problem Relation Age of Onset    Diabetes Father     Other Mother      blood disorder    Other Maternal Aunt      blood disorder    Cancer Maternal Aunt      breast    Other Paternal Aunt      chiari malformation type 2     Cancer Paternal Aunt      ovarian cancer    Cancer Maternal Grandmother     Cancer Maternal Grandfather     Cancer Paternal Grandmother     High Blood Pressure Paternal Grandfather     Heart Disease Paternal Grandfather     Other Maternal Aunt      brain aneurysm       Social History   Substance Use Topics    Smoking status: Never Smoker    Smokeless tobacco: Never Used    Alcohol use 0.0 oz/week      Comment: social       Current Outpatient Prescriptions   Medication Sig Dispense Refill    RA LAXATIVE 5 MG EC tablet take 4 tablets by mouth as directed  0    dicyclomine (BENTYL) 20 MG tablet Take 1 tablet by mouth 3 Sulfamethoxazole-Trimethoprim Hives    Tetanus Toxoid, Adsorbed     Tetanus Toxoids        Health Maintenance   Topic Date Due    HIV screen  11/14/1997    Cervical cancer screen  05/14/2018    Flu vaccine  Completed    Pneumococcal med risk  Completed       Subjective:      Review of Systems   Constitutional: Negative for chills, diaphoresis, fatigue and fever. HENT: Negative for congestion, ear discharge, ear pain, postnasal drip, rhinorrhea, sinus pressure and sore throat. Eyes: Negative for discharge and itching. Respiratory: Negative for cough, chest tightness and shortness of breath. Cardiovascular: Negative for chest pain, palpitations and leg swelling. Gastrointestinal: Positive for abdominal pain, diarrhea, nausea and vomiting. Genitourinary: Negative for dysuria and frequency. Musculoskeletal: Negative for neck pain and neck stiffness. Skin: Negative for rash. Neurological: Negative for dizziness, weakness, light-headedness, numbness and headaches. All other systems reviewed and are negative. Objective:     Physical Exam   Constitutional: She is oriented to person, place, and time. She appears well-developed and well-nourished. No distress. /74   Pulse 100   Temp 98.1 °F (36.7 °C) (Oral)   Ht 5' 2\" (1.575 m)   Wt 193 lb 1.6 oz (87.6 kg)   LMP 11/17/2017   SpO2 98%   BMI 35.32 kg/m²      HENT:   Head: Normocephalic and atraumatic. Right Ear: External ear normal.   Left Ear: External ear normal.   Nose: Nose normal.   Mouth/Throat: Oropharynx is clear and moist.   Eyes: Conjunctivae and EOM are normal. Pupils are equal, round, and reactive to light. Right eye exhibits no discharge. Left eye exhibits no discharge. No scleral icterus. Neck: Normal range of motion. Neck supple. No tracheal deviation present. No thyromegaly present. Cardiovascular: Normal rate, regular rhythm and normal heart sounds. Exam reveals no gallop and no friction rub.     No murmur heard.  Pulmonary/Chest: Effort normal and breath sounds normal. No stridor. No respiratory distress. She has no wheezes. She has no rales. She exhibits no tenderness. Abdominal: Soft. Bowel sounds are normal. She exhibits no distension. There is no tenderness. There is no rebound and no guarding. Musculoskeletal: She exhibits no edema. Neurological: She is alert and oriented to person, place, and time. Gait normal.   Skin: Skin is warm and dry. No rash noted. She is not diaphoretic. Psychiatric: She has a normal mood and affect. Her affect is not inappropriate. Nursing note and vitals reviewed. /74   Pulse 100   Temp 98.1 °F (36.7 °C) (Oral)   Ht 5' 2\" (1.575 m)   Wt 193 lb 1.6 oz (87.6 kg)   LMP 11/17/2017   SpO2 98%   BMI 35.32 kg/m²     Assessment:      1. Nausea and vomiting, intractability of vomiting not specified, unspecified vomiting type  NM Hepatobiliary   2. Abdominal pain, unspecified abdominal location  NM Hepatobiliary   3. Diarrhea, unspecified type  NM Hepatobiliary   4. Abnormal gallbladder ultrasound  NM Hepatobiliary             Plan:      No Follow-up on file. Orders Placed This Encounter   Procedures    NM Hepatobiliary     Standing Status:   Future     Standing Expiration Date:   12/13/2018     Order Specific Question:   Reason for exam:     Answer:   pain     Orders Placed This Encounter   Medications    dicyclomine (BENTYL) 20 MG tablet     Sig: Take 1 tablet by mouth 3 times daily as needed (abd pain)     Dispense:  20 tablet     Refill:  0       Patient given educational materials - see patient instructions. Discussed use, benefit, and side effects of prescribed medications. All patient questions answered. Pt voiced understanding. Reviewed health maintenance. Instructed to continue current medications, diet and exercise. Patient agreed with treatment plan. Follow up as directed.      Electronically signed by Joshua Mike PA-C on 12/13/2017 at 1:56

## 2017-12-15 ENCOUNTER — OFFICE VISIT (OUTPATIENT)
Dept: NEUROLOGY | Age: 37
End: 2017-12-15
Payer: MEDICARE

## 2017-12-15 VITALS
HEART RATE: 76 BPM | SYSTOLIC BLOOD PRESSURE: 120 MMHG | BODY MASS INDEX: 35.88 KG/M2 | DIASTOLIC BLOOD PRESSURE: 76 MMHG | HEIGHT: 62 IN | WEIGHT: 195 LBS

## 2017-12-15 DIAGNOSIS — T50.905S MEDICATION SIDE EFFECTS, SEQUELA: ICD-10-CM

## 2017-12-15 PROCEDURE — G8484 FLU IMMUNIZE NO ADMIN: HCPCS | Performed by: PSYCHIATRY & NEUROLOGY

## 2017-12-15 PROCEDURE — G8417 CALC BMI ABV UP PARAM F/U: HCPCS | Performed by: PSYCHIATRY & NEUROLOGY

## 2017-12-15 PROCEDURE — G8427 DOCREV CUR MEDS BY ELIG CLIN: HCPCS | Performed by: PSYCHIATRY & NEUROLOGY

## 2017-12-15 PROCEDURE — 99204 OFFICE O/P NEW MOD 45 MIN: CPT | Performed by: PSYCHIATRY & NEUROLOGY

## 2017-12-15 PROCEDURE — 1036F TOBACCO NON-USER: CPT | Performed by: PSYCHIATRY & NEUROLOGY

## 2017-12-15 NOTE — PROGRESS NOTES
[facility]  [facility], [address], [address] [facility]  Dept: [phone]  Dept Fax: [phone]     MD LIO Otto MD Randeen Breed, MD Ahmed B. Canary Nightingale, MD Solmon Ruddy, MD Nancye Beers, CNP    New Patient Consultation    12/15/2017    History of Present Illness:  I had the pleasure of seeing your patient, Jono Talavera who presents with numbness and tingling. Her symptoms strarted about 7 years ago, and has gradually gotten worse. It started in both legs, then gradually moved to both arms. She denies any painful burning or dysesthesias. The symptoms are intermittent, no known triggers, worse with cold weather, relieved by warmth. Her tingling is more noticeable during the day, and more severe on the R leg. She reports her symptoms are mild, and does not affect her ability to do her activities of daily living. She denies any difficulty with writing, or use of her hands. She denies any associated weakness, falls or balance issues, she does report lower back pain that has been going on for years. She had PT recently for her lower back, which helped some. She denies any prior history of trauma. Of note, she is on topamax 125 mg BID for her migraines, and has been taking it for about a year. She is also on amitriptyline 75 mg qhs  for insomnia and migraines.     Prior Workup:  EMG 12/4/17: No evidence of neuropathy, plexopathy, radiculopathy or myopathy On the right upper and lower extremities  MRI brain without contrast 3/14/2016: No acute intracranial abnormality    Lab Results   Component Value Date    EZNDDMCV27 373 11/13/2017        Ft4: 1.06  Lab Results   Component Value Date    TSH 2.32 11/13/2017       Lab Results   Component Value Date    FOLATE 15.6 11/13/2017     Lab Results   Component Value Date     11/30/2017    K 3.9 11/30/2017     11/30/2017    CO2 18 (L) 11/30/2017    BUN 7 11/30/2017    CREATININE 0.71 11/30/2017    GLUCOSE 75 11/30/2017    CALCIUM 8.8 11/30/2017    PROT 7.8 11/30/2017    LABALBU 4.1 11/30/2017    BILITOT 0.48 11/30/2017    ALKPHOS 69 11/30/2017    AST 14 11/30/2017    ALT 10 11/30/2017    LABGLOM >60 11/30/2017    GFRAA >60 11/30/2017           Past Medical History:   Diagnosis Date    Allergic rhinitis     Arthritis     Asthma     GERD (gastroesophageal reflux disease)     Heart murmur     Hyperlipidemia 9/15/2015    Petit mal ([address]     Psoriasis     Pulmonary stenosis        Past Surgical History:   Procedure Laterality Date    LEEP  2008    WISDOM TOOTH EXTRACTION Right 1/8/16    WISDOM TOOTH EXTRACTION         Family History   Problem Relation Age of Onset    Diabetes Father     Other Mother      blood disorder    Other Maternal Aunt      blood disorder    Cancer Maternal Aunt      breast    Other Paternal Aunt      chiari malformation type 2     Cancer Paternal Aunt      ovarian cancer    Cancer Maternal Grandmother     Cancer Maternal Grandfather     Cancer Paternal Grandmother     High Blood Pressure Paternal Grandfather     Heart Disease Paternal Grandfather     Other Maternal Aunt      brain aneurysm       Social History     Social History    Marital status: Legally      Spouse name: N/A    Number of children: N/A    Years of education: N/A     Social History Main Topics    Smoking status: Never Smoker    Smokeless tobacco: Never Used    Alcohol use 0.0 oz/week      Comment: social     Drug use: No    Sexual activity: Yes     Partners: Male     Birth control/ protection: Pill     Other Topics Concern    None     Social History Narrative    None       Current Outpatient Prescriptions   Medication Sig Dispense Refill    dicyclomine (BENTYL) 20 MG tablet Take 1 tablet by mouth 3 times daily as needed (abd pain) 20 tablet 0    amitriptyline (ELAVIL) 75 MG tablet Take 1 tablet by mouth nightly 30 tablet 3    topiramate (TOPAMAX) 25 MG tablet Take 25 mg by mouth 2 times daily      fluticasone (FLONASE) 50 MCG/ACT nasal spray 2 sprays by Nasal route      norgestrel-ethinyl estradiol (CRYSELLE-28) 0.3-30 MG-MCG per tablet Take 1 tablet by mouth daily 28 tablet 11    topiramate (TOPAMAX) 100 MG tablet take 1 tablet by mouth twice a day  0    promethazine (PHENERGAN) 25 MG tablet TAKE 1 TABLET BY MOUTH DAILY IF NEEDED FOR NAUSEA AND VOMITING  0    atorvastatin (LIPITOR) 20 MG tablet take 1 tablet by mouth once daily 30 tablet 3    levothyroxine (SYNTHROID) 25 MCG tablet Take 1 tablet by mouth Daily 30 tablet 3    omeprazole (PRILOSEC) 20 MG delayed release capsule Take 1 capsule by mouth Daily 30 capsule 3    Cholecalciferol (VITAMIN D) 2000 units CAPS capsule Take 2,000 Units by mouth daily 30 capsule 5    albuterol sulfate HFA (PROAIR HFA) 108 (90 BASE) MCG/ACT inhaler Inhale 2 puffs into the lungs every 6 hours as needed for Wheezing 1 Inhaler 3    butalbital-acetaminophen-caffeine (FIORICET, ESGIC) -40 MG per tablet Take 1 tablet by mouth every 6 hours as needed for Headaches 40 tablet 0    ibuprofen (ADVIL;MOTRIN) 800 MG tablet Take 1 tablet by mouth every 8 hours as needed for Pain 60 tablet 5    diphenhydrAMINE (BENADRYL) 25 MG tablet Take 25 mg by mouth every 6 hours as needed for Itching      RA LAXATIVE 5 MG EC tablet take 4 tablets by mouth as directed  0    polyethylene glycol (GLYCOLAX) powder Please dispense with 4 dulcolax tabs with this prescription. Use as directed by following your patient instructions given by office. 255 g 0     No current facility-administered medications for this visit.         Allergies   Allergen Reactions    Food Hives     Oysters, clams, mussels    Bactrim [Sulfamethoxazole-Trimethoprim] Hives    Decadron [Dexamethasone]      Hives and hot flashes  Hives and hot flashes    Seasonal     Sulfamethoxazole-Trimethoprim Hives    Tetanus Toxoid, Adsorbed     Tetanus Toxoids             REVIEW OF Extremities: Extremities normal, no cyanosis, no edema   Pulses: Symmetric over head and neck   Skin: Skin color, texture normal, no rashes, no lesions               Neurological Examination    Neurologic Exam     Mental Status   Oriented to person, place, and time. Attention: normal. Concentration: normal.   Speech: speech is normal   Level of consciousness: alert  Knowledge: good and consistent with education. Cranial Nerves   Cranial nerves II through XII intact. Motor Exam   Muscle bulk: normal  Overall muscle tone: normal  Right arm tone: normal  Left arm tone: normal  Right leg tone: normal  Left leg tone: normal    Strength   Strength 5/5 throughout. Sensory Exam   Light touch normal.   Vibration normal.   Proprioception normal.   Pinprick normal.     Gait, Coordination, and Reflexes     Gait  Gait: normal    Coordination   Finger to nose coordination: normal  Heel to shin coordination: normal  Tandem walking coordination: normal    Tremor   Resting tremor: absent  Intention tremor: absent  Action tremor: absent    Reflexes   Right brachioradialis: 2+  Left brachioradialis: 2+  Right biceps: 2+  Left biceps: 2+  Right triceps: 2+  Left triceps: 2+  Right patellar: 2+  Left patellar: 2+  Right achilles: 2+  Left achilles: 2+  Right : 2+  Left : 2+  Right plantar: normal  Left plantar: normal  Right ankle clonus: absent  Left ankle clonus: absent        Assessment/Plan: This is a 30 y.o. female who presents with 7 year history of intermittent numbness and tingling in all 4 extremities, progressive. On exam, she has no subjective sensory loss, no weakness or asymmetric reflexes. Recent EMG was unremarkable. Of note, she is on Topamax 125 mg twice a day for her migraines and has been taking it for about a year.  Recent TSH, free T4, vitamin B12 and folate normal.    1. Will complete workup for small fiber neuropathy, including urine and serum electrophoresis, homocysteine, methylmalonic

## 2017-12-21 NOTE — TELEPHONE ENCOUNTER
From: Xiomara Martinez  Sent: 12/21/2017 10:52 AM EST  Subject: Medication Renewal Request    Nickoyris Michelle would like a refill of the following medications:  Cholecalciferol (VITAMIN D) 2000 units CAPS capsule [EMANUEL Robb Simple PAMohinderC]  atorvastatin (LIPITOR) 20 MG tablet [EMANUEL Robb SimpleANITA]    Preferred pharmacy: [address] [phone] Venessa Lopes [phone]    Comment:  Have enough to last till the 24 of each of these, please send script over to pharmacy.  thank you

## 2017-12-21 NOTE — TELEPHONE ENCOUNTER
From: Rossi Castellanos  Sent: 12/21/2017 1:44 PM EST  Subject: Medication Renewal Request    Rossi Castellanos would like a refill of the following medications:  levothyroxine (SYNTHROID) 25 MCG tablet [EMANUEL Castelan PA-C]    Preferred pharmacy: [facility] [address] [phone] Anita Rush [phone]    Comment:  only have enough till the 23rd. please send script to my pharmacy. thank you.

## 2017-12-22 RX ORDER — ATORVASTATIN CALCIUM 20 MG/1
TABLET, FILM COATED ORAL
Qty: 30 TABLET | Refills: 3 | Status: SHIPPED | OUTPATIENT
Start: 2017-12-22 | End: 2018-04-26 | Stop reason: SDUPTHER

## 2017-12-22 RX ORDER — MULTIVIT-MIN/IRON/FOLIC ACID/K 18-600-40
2000 CAPSULE ORAL DAILY
Qty: 30 CAPSULE | Refills: 5 | Status: SHIPPED | OUTPATIENT
Start: 2017-12-22 | End: 2018-06-21 | Stop reason: SDUPTHER

## 2017-12-22 RX ORDER — LEVOTHYROXINE SODIUM 0.03 MG/1
25 TABLET ORAL DAILY
Qty: 30 TABLET | Refills: 3 | Status: SHIPPED | OUTPATIENT
Start: 2017-12-22 | End: 2018-04-26 | Stop reason: SDUPTHER

## 2017-12-29 ENCOUNTER — HOSPITAL ENCOUNTER (OUTPATIENT)
Dept: NUCLEAR MEDICINE | Age: 37
Discharge: HOME | End: 2017-12-29
Payer: MEDICARE

## 2017-12-29 DIAGNOSIS — R11.2 NAUSEA AND VOMITING, INTRACTABILITY OF VOMITING NOT SPECIFIED, UNSPECIFIED VOMITING TYPE: ICD-10-CM

## 2017-12-29 DIAGNOSIS — R93.2 ABNORMAL GALLBLADDER ULTRASOUND: ICD-10-CM

## 2017-12-29 DIAGNOSIS — R19.7 DIARRHEA, UNSPECIFIED TYPE: ICD-10-CM

## 2017-12-29 DIAGNOSIS — R10.9 ABDOMINAL PAIN, UNSPECIFIED ABDOMINAL LOCATION: ICD-10-CM

## 2017-12-29 PROCEDURE — A9537 TC99M MEBROFENIN: HCPCS | Performed by: PHYSICIAN ASSISTANT

## 2017-12-29 PROCEDURE — 3430000000 HC RX DIAGNOSTIC RADIOPHARMACEUTICAL: Performed by: PHYSICIAN ASSISTANT

## 2017-12-29 PROCEDURE — 78226 HEPATOBILIARY SYSTEM IMAGING: CPT

## 2017-12-29 RX ADMIN — Medication 4.3 MILLICURIE: at 08:45

## 2018-01-08 ENCOUNTER — HOSPITAL ENCOUNTER (OUTPATIENT)
Facility: CLINIC | Age: 38
Setting detail: OUTPATIENT SURGERY
Discharge: HOME | End: 2018-01-08
Attending: INTERNAL MEDICINE | Admitting: INTERNAL MEDICINE
Payer: MEDICARE

## 2018-01-08 ENCOUNTER — HOSPITAL ENCOUNTER (OUTPATIENT)
Age: 38
Setting detail: SPECIMEN
Discharge: HOME | End: 2018-01-08
Payer: MEDICARE

## 2018-01-08 ENCOUNTER — ANESTHESIA (OUTPATIENT)
Dept: OPERATING ROOM | Facility: CLINIC | Age: 38
End: 2018-01-08
Payer: MEDICARE

## 2018-01-08 ENCOUNTER — ANESTHESIA EVENT (OUTPATIENT)
Dept: OPERATING ROOM | Facility: CLINIC | Age: 38
End: 2018-01-08
Payer: MEDICARE

## 2018-01-08 VITALS
DIASTOLIC BLOOD PRESSURE: 87 MMHG | OXYGEN SATURATION: 98 % | RESPIRATION RATE: 20 BRPM | SYSTOLIC BLOOD PRESSURE: 118 MMHG

## 2018-01-08 VITALS
HEIGHT: 62 IN | BODY MASS INDEX: 35.88 KG/M2 | HEART RATE: 92 BPM | OXYGEN SATURATION: 99 % | WEIGHT: 195 LBS | SYSTOLIC BLOOD PRESSURE: 106 MMHG | RESPIRATION RATE: 14 BRPM | TEMPERATURE: 97.2 F | DIASTOLIC BLOOD PRESSURE: 70 MMHG

## 2018-01-08 LAB — HCG, PREGNANCY URINE (POC): NEGATIVE

## 2018-01-08 PROCEDURE — 3609010300 HC COLONOSCOPY W/BIOPSY SINGLE/MULTIPLE: Performed by: INTERNAL MEDICINE

## 2018-01-08 PROCEDURE — 6360000002 HC RX W HCPCS: Performed by: NURSE ANESTHETIST, CERTIFIED REGISTERED

## 2018-01-08 PROCEDURE — 3609012400 HC EGD TRANSORAL BIOPSY SINGLE/MULTIPLE: Performed by: INTERNAL MEDICINE

## 2018-01-08 PROCEDURE — 7100000011 HC PHASE II RECOVERY - ADDTL 15 MIN: Performed by: INTERNAL MEDICINE

## 2018-01-08 PROCEDURE — 2580000003 HC RX 258: Performed by: ANESTHESIOLOGY

## 2018-01-08 PROCEDURE — 3700000000 HC ANESTHESIA ATTENDED CARE: Performed by: INTERNAL MEDICINE

## 2018-01-08 PROCEDURE — 3700000001 HC ADD 15 MINUTES (ANESTHESIA): Performed by: INTERNAL MEDICINE

## 2018-01-08 PROCEDURE — 7100000010 HC PHASE II RECOVERY - FIRST 15 MIN: Performed by: INTERNAL MEDICINE

## 2018-01-08 PROCEDURE — 84703 CHORIONIC GONADOTROPIN ASSAY: CPT

## 2018-01-08 PROCEDURE — 7100000000 HC PACU RECOVERY - FIRST 15 MIN: Performed by: INTERNAL MEDICINE

## 2018-01-08 PROCEDURE — 2500000003 HC RX 250 WO HCPCS: Performed by: NURSE ANESTHETIST, CERTIFIED REGISTERED

## 2018-01-08 RX ORDER — FENTANYL CITRATE 50 UG/ML
50 INJECTION, SOLUTION INTRAMUSCULAR; INTRAVENOUS EVERY 5 MIN PRN
Status: DISCONTINUED | OUTPATIENT
Start: 2018-01-08 | End: 2018-01-08 | Stop reason: HOSPADM

## 2018-01-08 RX ORDER — LIDOCAINE HYDROCHLORIDE 10 MG/ML
INJECTION, SOLUTION INFILTRATION; PERINEURAL PRN
Status: DISCONTINUED | OUTPATIENT
Start: 2018-01-08 | End: 2018-01-08 | Stop reason: SDUPTHER

## 2018-01-08 RX ORDER — ONDANSETRON 2 MG/ML
4 INJECTION INTRAMUSCULAR; INTRAVENOUS
Status: DISCONTINUED | OUTPATIENT
Start: 2018-01-08 | End: 2018-01-08 | Stop reason: HOSPADM

## 2018-01-08 RX ORDER — PROPOFOL 10 MG/ML
INJECTION, EMULSION INTRAVENOUS PRN
Status: DISCONTINUED | OUTPATIENT
Start: 2018-01-08 | End: 2018-01-08 | Stop reason: SDUPTHER

## 2018-01-08 RX ORDER — FENTANYL CITRATE 50 UG/ML
25 INJECTION, SOLUTION INTRAMUSCULAR; INTRAVENOUS EVERY 5 MIN PRN
Status: DISCONTINUED | OUTPATIENT
Start: 2018-01-08 | End: 2018-01-08 | Stop reason: HOSPADM

## 2018-01-08 RX ORDER — SODIUM CHLORIDE, SODIUM LACTATE, POTASSIUM CHLORIDE, CALCIUM CHLORIDE 600; 310; 30; 20 MG/100ML; MG/100ML; MG/100ML; MG/100ML
INJECTION, SOLUTION INTRAVENOUS CONTINUOUS
Status: DISCONTINUED | OUTPATIENT
Start: 2018-01-08 | End: 2018-01-08 | Stop reason: HOSPADM

## 2018-01-08 RX ORDER — MIDAZOLAM HYDROCHLORIDE 1 MG/ML
1 INJECTION INTRAMUSCULAR; INTRAVENOUS EVERY 10 MIN PRN
Status: DISCONTINUED | OUTPATIENT
Start: 2018-01-08 | End: 2018-01-08 | Stop reason: HOSPADM

## 2018-01-08 RX ORDER — MEPERIDINE HYDROCHLORIDE 50 MG/ML
12.5 INJECTION INTRAMUSCULAR; INTRAVENOUS; SUBCUTANEOUS EVERY 5 MIN PRN
Status: DISCONTINUED | OUTPATIENT
Start: 2018-01-08 | End: 2018-01-08 | Stop reason: HOSPADM

## 2018-01-08 RX ADMIN — PROPOFOL 50 MG: 10 INJECTION, EMULSION INTRAVENOUS at 11:12

## 2018-01-08 RX ADMIN — PROPOFOL 20 MG: 10 INJECTION, EMULSION INTRAVENOUS at 11:14

## 2018-01-08 RX ADMIN — SODIUM CHLORIDE, POTASSIUM CHLORIDE, SODIUM LACTATE AND CALCIUM CHLORIDE: 600; 310; 30; 20 INJECTION, SOLUTION INTRAVENOUS at 09:00

## 2018-01-08 RX ADMIN — PROPOFOL 20 MG: 10 INJECTION, EMULSION INTRAVENOUS at 11:02

## 2018-01-08 RX ADMIN — LIDOCAINE HYDROCHLORIDE 50 MG: 10 INJECTION, SOLUTION INFILTRATION; PERINEURAL at 10:59

## 2018-01-08 RX ADMIN — PROPOFOL 20 MG: 10 INJECTION, EMULSION INTRAVENOUS at 11:10

## 2018-01-08 RX ADMIN — PROPOFOL 20 MG: 10 INJECTION, EMULSION INTRAVENOUS at 11:07

## 2018-01-08 RX ADMIN — PROPOFOL 20 MG: 10 INJECTION, EMULSION INTRAVENOUS at 11:04

## 2018-01-08 RX ADMIN — PROPOFOL 50 MG: 10 INJECTION, EMULSION INTRAVENOUS at 10:59

## 2018-01-08 RX ADMIN — PROPOFOL 20 MG: 10 INJECTION, EMULSION INTRAVENOUS at 11:06

## 2018-01-08 RX ADMIN — PROPOFOL 50 MG: 10 INJECTION, EMULSION INTRAVENOUS at 11:03

## 2018-01-08 RX ADMIN — PROPOFOL 20 MG: 10 INJECTION, EMULSION INTRAVENOUS at 11:08

## 2018-01-08 RX ADMIN — PROPOFOL 20 MG: 10 INJECTION, EMULSION INTRAVENOUS at 11:15

## 2018-01-08 RX ADMIN — PROPOFOL 20 MG: 10 INJECTION, EMULSION INTRAVENOUS at 11:01

## 2018-01-08 RX ADMIN — PROPOFOL 10 MG: 10 INJECTION, EMULSION INTRAVENOUS at 11:17

## 2018-01-08 RX ADMIN — PROPOFOL 20 MG: 10 INJECTION, EMULSION INTRAVENOUS at 11:09

## 2018-01-08 RX ADMIN — PROPOFOL 50 MG: 10 INJECTION, EMULSION INTRAVENOUS at 11:00

## 2018-01-08 RX ADMIN — PROPOFOL 20 MG: 10 INJECTION, EMULSION INTRAVENOUS at 11:05

## 2018-01-08 RX ADMIN — SODIUM CHLORIDE, POTASSIUM CHLORIDE, SODIUM LACTATE AND CALCIUM CHLORIDE: 600; 310; 30; 20 INJECTION, SOLUTION INTRAVENOUS at 11:10

## 2018-01-08 ASSESSMENT — PULMONARY FUNCTION TESTS
PIF_VALUE: 0
PIF_VALUE: 1
PIF_VALUE: 0

## 2018-01-08 ASSESSMENT — PAIN - FUNCTIONAL ASSESSMENT: PAIN_FUNCTIONAL_ASSESSMENT: 0-10

## 2018-01-08 NOTE — OP NOTE
ESOPHAGOGASTRODUODENOSCOPY   ( EGD )  DATE OF PROCEDURE: 1/8/2018     SURGEON: Francisca Prader, MD    ASSISTANT: None    PREOPERATIVE DIAGNOSIS: nausea and vomiting, gerd    POSTOPERATIVE DIAGNOSIS: esophagitis    OPERATION: Upper GI endoscopy with Biopsy    ANESTHESIA: Moderate Sedation     ESTIMATED BLOOD LOSS: None    COMPLICATIONS: None. SPECIMENS:  Was Obtained: gastric and esophagus    HISTORY: The patient is a 30y.o. year old female with history of above preop diagnosis. I recommended esophagogastroduodenoscopy with possible biopsy and I explained the risk, benefits, expected outcome, and alternatives to the procedure. Risks included but are not limited to bleeding, infection, respiratory distress, hypotension, and perforation of the esophagus, stomach, or duodenum. Patient understands and is in agreement. PROCEDURE: The patient was given IV conscious sedation. The patient's SPO2 remained above 90% throughout the procedure. Cetacaine spray given. Patient placed in left lateral position. Olympus  videogastroscope was inserted orally under vision into the esophagus without difficulty and advanced into the stomach then through the pylorus up to the second part of duodenum. Findings:    Retropharyngeal area was grossly normal appearing    Esophagus: abnormal: grade 2 esophagitis. Random biopsies taken to check microscopic disease    Stomach:    Fundus and Cardia Examined in Retroflexed View: normal    Body: normal    Antrum: normal  Biopsies taken for HP  Duodenum:     Descending: normal    Bulb: normal    While withdrawing the scope the above findings were verified and the scope was removed. The patient has tolerated the procedure and conscious sedation without unusual events. Recommendations/Plan:   1. F/U Biopsies  2. F/U In Office as instructed  3.  Discussed with the family                   Electronically signed by Francisca Prader, MD  on 1/8/2018 at 11:22 AM

## 2018-01-08 NOTE — ANESTHESIA PRE PROCEDURE
Past Surgical History:        Procedure Laterality Date    COLONOSCOPY  01/08/2018    ENDOSCOPY, COLON, DIAGNOSTIC  01/08/2018    EGD and Colonoscopy    LEEP  2008    WISDOM TOOTH EXTRACTION Right 1/8/16    WISDOM TOOTH EXTRACTION         Social History:    Social History   Substance Use Topics    Smoking status: Never Smoker    Smokeless tobacco: Never Used    Alcohol use 0.0 oz/week      Comment: social                                 Counseling given: Not Answered      Vital Signs (Current):   Vitals:    01/08/18 0843   BP: 121/82   Pulse: 97   Resp: 14   Temp: 98.1 °F (36.7 °C)   TempSrc: Temporal   SpO2: 97%   Weight: 195 lb (88.5 kg)   Height: 5' 2\" (1.575 m)                                              BP Readings from Last 3 Encounters:   01/08/18 121/82   12/15/17 120/76   12/13/17 118/74       NPO Status: Time of last liquid consumption: 2100                        Time of last solid consumption: 2100                        Date of last liquid consumption: 01/07/18                        Date of last solid food consumption: 01/06/18    BMI:   Wt Readings from Last 3 Encounters:   01/08/18 195 lb (88.5 kg)   12/15/17 195 lb (88.5 kg)   12/13/17 193 lb 1.6 oz (87.6 kg)     Body mass index is 35.67 kg/m².     CBC:   Lab Results   Component Value Date    WBC 11.8 11/30/2017    RBC 5.12 11/30/2017    HGB 14.5 11/30/2017    HCT 46.9 11/30/2017    MCV 91.6 11/30/2017    RDW 13.4 11/30/2017     11/30/2017       CMP:   Lab Results   Component Value Date     11/30/2017    K 3.9 11/30/2017     11/30/2017    CO2 18 11/30/2017    BUN 7 11/30/2017    CREATININE 0.71 11/30/2017    GFRAA >60 11/30/2017    LABGLOM >60 11/30/2017    GLUCOSE 75 11/30/2017    PROT 7.8 11/30/2017    CALCIUM 8.8 11/30/2017    BILITOT 0.48 11/30/2017    ALKPHOS 69 11/30/2017    AST 14 11/30/2017    ALT 10 11/30/2017       POC Tests: No results for input(s): POCGLU, POCNA, POCK, POCCL, POCBUN, POCHEMO, POCHCT in the last 72 hours. Coags: No results found for: PROTIME, INR, APTT    HCG (If Applicable):   Lab Results   Component Value Date    PREGTESTUR neg 08/29/2017        ABGs: No results found for: PHART, PO2ART, FNY6LJV, HDF7NMI, BEART, N7XPUGTA     Type & Screen (If Applicable):  No results found for: [facility]    Anesthesia Evaluation  Patient summary reviewed and Nursing notes reviewed no history of anesthetic complications:   Airway: Mallampati: II  TM distance: >3 FB   Neck ROM: full  Mouth opening: > = 3 FB Dental: normal exam         Pulmonary:normal exam    (+) sleep apnea:  asthma:                            Cardiovascular:Negative CV ROS                      Neuro/Psych:   (+) seizures:, headaches:,              ROS comment: History of petit mal seizures, 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                 Abdominal:           Vascular:                                        Anesthesia Plan      MAC     ASA 2       Induction: intravenous. Anesthetic plan and risks discussed with patient. Plan discussed with CRNA.                   Yany Slater MD   1/8/2018

## 2018-01-11 LAB — SURGICAL PATHOLOGY REPORT: NORMAL

## 2018-01-23 DIAGNOSIS — D72.829 LEUKOCYTOSIS, UNSPECIFIED TYPE: Primary | ICD-10-CM

## 2018-01-25 DIAGNOSIS — K62.5 RECTAL BLEEDING: ICD-10-CM

## 2018-01-25 DIAGNOSIS — R11.2 NAUSEA AND VOMITING, INTRACTABILITY OF VOMITING NOT SPECIFIED, UNSPECIFIED VOMITING TYPE: ICD-10-CM

## 2018-01-26 ENCOUNTER — HOSPITAL ENCOUNTER (OUTPATIENT)
Facility: MEDICAL CENTER | Age: 38
Discharge: HOME | End: 2018-01-26
Payer: MEDICARE

## 2018-01-26 DIAGNOSIS — D72.829 LEUKOCYTOSIS, UNSPECIFIED TYPE: ICD-10-CM

## 2018-01-26 LAB
ABSOLUTE EOS #: 0.1 K/UL (ref 0–0.4)
ABSOLUTE IMMATURE GRANULOCYTE: ABNORMAL K/UL (ref 0–0.3)
ABSOLUTE LYMPH #: 3 K/UL (ref 1–4.8)
ABSOLUTE MONO #: 0.8 K/UL (ref 0.2–0.8)
BASOPHILS # BLD: 0 % (ref 0–2)
BASOPHILS ABSOLUTE: 0 K/UL (ref 0–0.2)
DIFFERENTIAL TYPE: ABNORMAL
EOSINOPHILS RELATIVE PERCENT: 1 % (ref 1–4)
HCT VFR BLD CALC: 43.6 % (ref 36–46)
HEMOGLOBIN: 14.3 G/DL (ref 12–16)
IMMATURE GRANULOCYTES: ABNORMAL %
LYMPHOCYTES # BLD: 25 % (ref 24–44)
MCH RBC QN AUTO: 28.7 PG (ref 26–34)
MCHC RBC AUTO-ENTMCNC: 32.8 G/DL (ref 31–37)
MCV RBC AUTO: 87.6 FL (ref 80–100)
MONOCYTES # BLD: 7 % (ref 1–7)
NRBC AUTOMATED: ABNORMAL PER 100 WBC
PDW BLD-RTO: 13.7 % (ref 11.5–14.5)
PLATELET # BLD: 396 K/UL (ref 130–400)
PLATELET ESTIMATE: ABNORMAL
PMV BLD AUTO: 6.6 FL (ref 6–12)
RBC # BLD: 4.98 M/UL (ref 4–5.2)
RBC # BLD: ABNORMAL 10*6/UL
SEG NEUTROPHILS: 67 % (ref 36–66)
SEGMENTED NEUTROPHILS ABSOLUTE COUNT: 8.3 K/UL (ref 1.8–7.7)
WBC # BLD: 12.2 K/UL (ref 3.5–11)
WBC # BLD: ABNORMAL 10*3/UL

## 2018-01-26 PROCEDURE — 36415 COLL VENOUS BLD VENIPUNCTURE: CPT

## 2018-01-26 PROCEDURE — 85025 COMPLETE CBC W/AUTO DIFF WBC: CPT

## 2018-01-29 ENCOUNTER — OFFICE VISIT (OUTPATIENT)
Dept: FAMILY MEDICINE CLINIC | Age: 38
End: 2018-01-29
Payer: MEDICARE

## 2018-01-29 VITALS
BODY MASS INDEX: 35.15 KG/M2 | OXYGEN SATURATION: 98 % | WEIGHT: 191 LBS | DIASTOLIC BLOOD PRESSURE: 72 MMHG | HEART RATE: 88 BPM | HEIGHT: 62 IN | SYSTOLIC BLOOD PRESSURE: 100 MMHG | TEMPERATURE: 97.9 F

## 2018-01-29 DIAGNOSIS — J40 BRONCHITIS: Primary | ICD-10-CM

## 2018-01-29 DIAGNOSIS — J01.90 ACUTE BACTERIAL SINUSITIS: ICD-10-CM

## 2018-01-29 DIAGNOSIS — B96.89 ACUTE BACTERIAL SINUSITIS: ICD-10-CM

## 2018-01-29 PROCEDURE — G8427 DOCREV CUR MEDS BY ELIG CLIN: HCPCS | Performed by: PHYSICIAN ASSISTANT

## 2018-01-29 PROCEDURE — G8417 CALC BMI ABV UP PARAM F/U: HCPCS | Performed by: PHYSICIAN ASSISTANT

## 2018-01-29 PROCEDURE — 1036F TOBACCO NON-USER: CPT | Performed by: PHYSICIAN ASSISTANT

## 2018-01-29 PROCEDURE — 99213 OFFICE O/P EST LOW 20 MIN: CPT | Performed by: PHYSICIAN ASSISTANT

## 2018-01-29 PROCEDURE — G8484 FLU IMMUNIZE NO ADMIN: HCPCS | Performed by: PHYSICIAN ASSISTANT

## 2018-01-29 RX ORDER — DOXYCYCLINE HYCLATE 100 MG
100 TABLET ORAL 2 TIMES DAILY
Qty: 20 TABLET | Refills: 0 | Status: SHIPPED | OUTPATIENT
Start: 2018-01-29 | End: 2018-02-08

## 2018-01-30 ASSESSMENT — ENCOUNTER SYMPTOMS
SWOLLEN GLANDS: 0
ABDOMINAL PAIN: 0
DIARRHEA: 0
SINUS PRESSURE: 1
CHEST TIGHTNESS: 0
SORE THROAT: 0
COUGH: 1
EYE ITCHING: 0
RHINORRHEA: 0
SHORTNESS OF BREATH: 0
EYE DISCHARGE: 0
VOMITING: 0
NAUSEA: 0
HOARSE VOICE: 0

## 2018-01-31 NOTE — PROGRESS NOTES
[address] [facility] [address]  Dept: [phone]  Dept Fax: [phone]    Trista Barr is a 30 y.o. female who presents today for her medical conditions/complaints as noted below. Trista Barr is c/o of   Chief Complaint   Patient presents with    Sinusitis    Cough         HPI:     Sinusitis   This is a recurrent problem. There has been no fever. Associated symptoms include chills, congestion, coughing and sinus pressure. Pertinent negatives include no diaphoresis, ear pain, headaches, hoarse voice, neck pain, shortness of breath, sneezing, sore throat or swollen glands.        No results found for: LABA1C          ( goal A1C is < 7)   No results found for: LABMICR  LDL Cholesterol (mg/dL)   Date Value   08/14/2017 76   08/18/2016 75   09/11/2015 80       (goal LDL is <100)   AST (U/L)   Date Value   11/30/2017 14     ALT (U/L)   Date Value   11/30/2017 10     BUN (mg/dL)   Date Value   11/30/2017 7     BP Readings from Last 3 Encounters:   01/29/18 100/72   01/08/18 106/70   01/08/18 118/87          (goal 120/80)    Past Medical History:   Diagnosis Date    Allergic rhinitis     Arthritis     Asthma     GERD (gastroesophageal reflux disease)     Heart murmur     Hyperlipidemia 9/15/2015    Petit mal ([facility] [address]     Psoriasis     Pulmonary stenosis     Thyroid disease       Past Surgical History:   Procedure Laterality Date    COLONOSCOPY  01/08/2018    COLON, RANDOM BIOPSIES: MILD FOCAL ACTIVE COLITIS, HEMORRHOIDS    ENDOSCOPY, COLON, DIAGNOSTIC  01/08/2018    EGD and Colonoscopy    LEEP  2008    NE COLONOSCOPY W/BIOPSY SINGLE/MULTIPLE N/A 1/8/2018    COLONOSCOPY WITH BIOPSY performed by Anitra Carbone MD at [address] EGD TRANSORAL BIOPSY SINGLE/MULTIPLE N/A 1/8/2018    EGD BIOPSY performed by Anitra Carbone MD at [address] [place] [address]  01/08/2018     grade 2 esophagitis headaches. All other systems reviewed and are negative. Objective:     Physical Exam   Constitutional: She is oriented to person, place, and time. She appears well-developed and well-nourished. No distress. /72   Pulse 88   Temp 97.9 °F (36.6 °C) (Oral)   Ht 5' 2\" (1.575 m)   Wt 191 lb (86.6 kg)   SpO2 98%   BMI 34.93 kg/m²      HENT:   Head: Normocephalic and atraumatic. Right Ear: External ear normal.   Left Ear: External ear normal.   Nose: Nose normal.   Mouth/Throat: Oropharynx is clear and moist.   PND   Eyes: Conjunctivae and EOM are normal. Pupils are equal, round, and reactive to light. Right eye exhibits no discharge. Left eye exhibits no discharge. No scleral icterus. Neck: Normal range of motion. Neck supple. No tracheal deviation present. No thyromegaly present. Cardiovascular: Normal rate, regular rhythm and normal heart sounds. Exam reveals no gallop and no friction rub. No murmur heard. Pulmonary/Chest: Effort normal and breath sounds normal. No stridor. No respiratory distress. She has no wheezes. She has no rales. She exhibits no tenderness. Abdominal: Soft. Bowel sounds are normal. She exhibits no distension. There is no tenderness. There is no rebound and no guarding. Musculoskeletal: She exhibits no edema. Neurological: She is alert and oriented to person, place, and time. Gait normal.   Skin: Skin is warm and dry. No rash noted. She is not diaphoretic. Psychiatric: She has a normal mood and affect. Her affect is not inappropriate. Nursing note and vitals reviewed. /72   Pulse 88   Temp 97.9 °F (36.6 °C) (Oral)   Ht 5' 2\" (1.575 m)   Wt 191 lb (86.6 kg)   SpO2 98%   BMI 34.93 kg/m²     Assessment:      1. Bronchitis     2. Acute bacterial sinusitis               Plan:      No Follow-up on file. No orders of the defined types were placed in this encounter.     Orders Placed This Encounter   Medications    doxycycline hyclate (VIBRA-TABS)

## 2018-02-12 ENCOUNTER — HOSPITAL ENCOUNTER (OUTPATIENT)
Age: 38
Discharge: HOME | End: 2018-02-12
Payer: MEDICARE

## 2018-02-12 ENCOUNTER — HOSPITAL ENCOUNTER (OUTPATIENT)
Age: 38
Discharge: HOME | End: 2018-02-12

## 2018-02-12 LAB
ESTIMATED AVERAGE GLUCOSE: 97 MG/DL
HBA1C MFR BLD: 5 % (ref 4–6)
HOMOCYSTEINE: 10.5 UMOL/L

## 2018-02-12 PROCEDURE — 84156 ASSAY OF PROTEIN URINE: CPT

## 2018-02-12 PROCEDURE — 83921 ORGANIC ACID SINGLE QUANT: CPT

## 2018-02-12 PROCEDURE — 36415 COLL VENOUS BLD VENIPUNCTURE: CPT

## 2018-02-12 PROCEDURE — 84165 PROTEIN E-PHORESIS SERUM: CPT

## 2018-02-12 PROCEDURE — 82525 ASSAY OF COPPER: CPT

## 2018-02-12 PROCEDURE — 83090 ASSAY OF HOMOCYSTEINE: CPT

## 2018-02-12 PROCEDURE — 83036 HEMOGLOBIN GLYCOSYLATED A1C: CPT

## 2018-02-12 PROCEDURE — 84166 PROTEIN E-PHORESIS/URINE/CSF: CPT

## 2018-02-12 PROCEDURE — 84155 ASSAY OF PROTEIN SERUM: CPT

## 2018-02-14 LAB
ALBUMIN (CALCULATED): 4.4 G/DL (ref 3.2–5.2)
ALBUMIN PERCENT: 60 % (ref 45–65)
ALPHA 1 PERCENT: 3 % (ref 3–6)
ALPHA 2 PERCENT: 12 % (ref 6–13)
ALPHA-1-GLOBULIN: 0.2 G/DL (ref 0.1–0.4)
ALPHA-2-GLOBULIN: 0.9 G/DL (ref 0.5–0.9)
BETA GLOBULIN: 0.9 G/DL (ref 0.5–1.1)
BETA PERCENT: 12 % (ref 11–19)
COPPER: 172 UG/DL (ref 80–155)
GAMMA GLOBULIN %: 14 % (ref 9–20)
GAMMA GLOBULIN: 1 G/DL (ref 0.5–1.5)
P E INTERPRETATION, U: NORMAL
PATHOLOGIST: NORMAL
PATHOLOGIST: NORMAL
PROTEIN ELECTROPHORESIS, SERUM: NORMAL
SPECIMEN TYPE: NORMAL
TOTAL PROT. SUM,%: 101 % (ref 98–102)
TOTAL PROT. SUM: 7.4 G/DL (ref 6.3–8.2)
TOTAL PROTEIN: 7.3 G/DL (ref 6.4–8.3)
URINE TOTAL PROTEIN: 10 MG/DL

## 2018-02-15 LAB — METHYLMALONIC ACID: 0.14 UMOL/L (ref 0–0.4)

## 2018-02-27 ENCOUNTER — OFFICE VISIT (OUTPATIENT)
Dept: NEUROLOGY | Age: 38
End: 2018-02-27
Payer: MEDICARE

## 2018-02-27 VITALS
HEART RATE: 92 BPM | WEIGHT: 192.4 LBS | DIASTOLIC BLOOD PRESSURE: 76 MMHG | HEIGHT: 62 IN | SYSTOLIC BLOOD PRESSURE: 125 MMHG | BODY MASS INDEX: 35.41 KG/M2

## 2018-02-27 DIAGNOSIS — R20.2 PARESTHESIAS: ICD-10-CM

## 2018-02-27 DIAGNOSIS — G43.709 CHRONIC MIGRAINE W/O AURA, NOT INTRACTABLE, W/O STAT MIGR: Primary | ICD-10-CM

## 2018-02-27 PROCEDURE — G8427 DOCREV CUR MEDS BY ELIG CLIN: HCPCS | Performed by: PSYCHIATRY & NEUROLOGY

## 2018-02-27 PROCEDURE — G8417 CALC BMI ABV UP PARAM F/U: HCPCS | Performed by: PSYCHIATRY & NEUROLOGY

## 2018-02-27 PROCEDURE — G8484 FLU IMMUNIZE NO ADMIN: HCPCS | Performed by: PSYCHIATRY & NEUROLOGY

## 2018-02-27 PROCEDURE — 99213 OFFICE O/P EST LOW 20 MIN: CPT | Performed by: PSYCHIATRY & NEUROLOGY

## 2018-02-27 PROCEDURE — 1036F TOBACCO NON-USER: CPT | Performed by: PSYCHIATRY & NEUROLOGY

## 2018-02-27 RX ORDER — AMITRIPTYLINE HYDROCHLORIDE 100 MG/1
100 TABLET, FILM COATED ORAL NIGHTLY
Qty: 90 TABLET | Refills: 1 | Status: SHIPPED | OUTPATIENT
Start: 2018-02-27 | End: 2018-09-10

## 2018-02-27 RX ORDER — SUMATRIPTAN 50 MG/1
50 TABLET, FILM COATED ORAL
Qty: 10 TABLET | Refills: 3 | Status: SHIPPED | OUTPATIENT
Start: 2018-02-27 | End: 2018-04-29 | Stop reason: SDUPTHER

## 2018-02-27 NOTE — PROGRESS NOTES
Heart:  Regular rate and rhythm           Extremities: Extremities normal, no cyanosis, no edema   Pulses: Symmetric over head and neck   Skin: Skin color, texture normal, no rashes, no lesions                                             NEUROLOGIC EXAMINATION    Neurologic Exam     Mental Status   Oriented to person, place, and time. Attention: normal. Concentration: normal.   Speech: speech is normal   Level of consciousness: alert  Knowledge: good and consistent with education. Cranial Nerves   Cranial nerves II through XII intact. Motor Exam   Muscle bulk: normal  Overall muscle tone: normal  Right arm tone: normal  Left arm tone: normal  Right leg tone: normal  Left leg tone: normal    Strength   Strength 5/5 throughout. Sensory Exam   Light touch normal.   Vibration normal.   Proprioception normal.   Pinprick normal.     Gait, Coordination, and Reflexes     Gait  Gait: normal    Coordination   Finger to nose coordination: normal  Heel to shin coordination: normal  Tandem walking coordination: normal    Tremor   Resting tremor: absent  Intention tremor: absent  Action tremor: absent    Reflexes   Right brachioradialis: 2+  Left brachioradialis: 2+  Right biceps: 2+  Left biceps: 2+  Right triceps: 2+  Left triceps: 2+  Right patellar: 2+  Left patellar: 2+  Right achilles: 2+  Left achilles: 2+  Right plantar: normal  Left plantar: normal  Right ankle clonus: absent  Left ankle clonus: absent            ASSESSMENT/PLAN:       This is a 30 y.o. female who comes in for follow up for intermittent paresthesias in all 4 extremities, EMG, MRI, as well as neuropathy workup unremarkable. Her symptoms are most likely secondary to Topamax which she takes for headaches. Patient is currently weaning herself off Topamax, and she has had significant improvement of her paresthesias on a lower dose. She is currently taking 25 mg twice a day and will be totally off it in mid March.  Advised patient to continue doing this, will increase her amitriptyline from 75 to100 mg daily to cover her migraine headaches. Will also switch her ibuprofen and Fioricet to sumatriptan 50 mg when necessary for abortive treatment. Follow-up in early April.             Dg Oakley MD

## 2018-03-01 PROBLEM — K52.9 COLITIS: Status: ACTIVE | Noted: 2018-03-01

## 2018-03-05 ENCOUNTER — OFFICE VISIT (OUTPATIENT)
Dept: GASTROENTEROLOGY | Age: 38
End: 2018-03-05
Payer: MEDICARE

## 2018-03-05 VITALS
DIASTOLIC BLOOD PRESSURE: 80 MMHG | HEART RATE: 94 BPM | BODY MASS INDEX: 35.15 KG/M2 | SYSTOLIC BLOOD PRESSURE: 118 MMHG | HEIGHT: 62 IN | WEIGHT: 191 LBS | OXYGEN SATURATION: 99 %

## 2018-03-05 DIAGNOSIS — R10.84 GENERALIZED ABDOMINAL PAIN: ICD-10-CM

## 2018-03-05 DIAGNOSIS — K52.9 COLITIS: Primary | ICD-10-CM

## 2018-03-05 DIAGNOSIS — R93.2 ABNORMAL ULTRASOUND OF GALLBLADDER: ICD-10-CM

## 2018-03-05 PROCEDURE — 99213 OFFICE O/P EST LOW 20 MIN: CPT | Performed by: INTERNAL MEDICINE

## 2018-03-05 PROCEDURE — 1036F TOBACCO NON-USER: CPT | Performed by: INTERNAL MEDICINE

## 2018-03-05 PROCEDURE — G8427 DOCREV CUR MEDS BY ELIG CLIN: HCPCS | Performed by: INTERNAL MEDICINE

## 2018-03-05 PROCEDURE — G8484 FLU IMMUNIZE NO ADMIN: HCPCS | Performed by: INTERNAL MEDICINE

## 2018-03-05 PROCEDURE — G8417 CALC BMI ABV UP PARAM F/U: HCPCS | Performed by: INTERNAL MEDICINE

## 2018-03-05 ASSESSMENT — ENCOUNTER SYMPTOMS
NAUSEA: 0
RHINORRHEA: 0
WHEEZING: 0
RECTAL PAIN: 0
BACK PAIN: 1
VOICE CHANGE: 0
DIARRHEA: 0
BLOOD IN STOOL: 0
SINUS PAIN: 0
TROUBLE SWALLOWING: 1
SHORTNESS OF BREATH: 1
VOMITING: 0
SORE THROAT: 0
ABDOMINAL DISTENTION: 0
ABDOMINAL PAIN: 1
COUGH: 0
FACIAL SWELLING: 0
CONSTIPATION: 0
SINUS PRESSURE: 0
ANAL BLEEDING: 0

## 2018-03-09 ENCOUNTER — OFFICE VISIT (OUTPATIENT)
Dept: FAMILY MEDICINE CLINIC | Age: 38
End: 2018-03-09
Payer: MEDICARE

## 2018-03-09 VITALS
TEMPERATURE: 98.1 F | SYSTOLIC BLOOD PRESSURE: 118 MMHG | DIASTOLIC BLOOD PRESSURE: 76 MMHG | OXYGEN SATURATION: 98 % | HEIGHT: 62 IN | BODY MASS INDEX: 35.7 KG/M2 | HEART RATE: 89 BPM | WEIGHT: 194 LBS

## 2018-03-09 DIAGNOSIS — Z11.4 ENCOUNTER FOR SCREENING FOR HIV: ICD-10-CM

## 2018-03-09 DIAGNOSIS — R79.89 ABNORMAL TSH: ICD-10-CM

## 2018-03-09 DIAGNOSIS — D72.829 LEUKOCYTOSIS, UNSPECIFIED TYPE: ICD-10-CM

## 2018-03-09 DIAGNOSIS — E78.5 HYPERLIPIDEMIA, UNSPECIFIED HYPERLIPIDEMIA TYPE: Chronic | ICD-10-CM

## 2018-03-09 DIAGNOSIS — R51.9 NONINTRACTABLE HEADACHE, UNSPECIFIED CHRONICITY PATTERN, UNSPECIFIED HEADACHE TYPE: ICD-10-CM

## 2018-03-09 DIAGNOSIS — E55.9 VITAMIN D DEFICIENCY: ICD-10-CM

## 2018-03-09 DIAGNOSIS — R10.11 RIGHT UPPER QUADRANT ABDOMINAL PAIN: ICD-10-CM

## 2018-03-09 DIAGNOSIS — Z00.00 ANNUAL PHYSICAL EXAM: Primary | ICD-10-CM

## 2018-03-09 PROCEDURE — 99395 PREV VISIT EST AGE 18-39: CPT | Performed by: PHYSICIAN ASSISTANT

## 2018-03-09 ASSESSMENT — ENCOUNTER SYMPTOMS
CONSTIPATION: 0
RHINORRHEA: 0
COUGH: 0
CHEST TIGHTNESS: 0
SINUS PRESSURE: 0
ABDOMINAL PAIN: 0
NAUSEA: 0
VOMITING: 0
GLOBUS SENSATION: 0
HEARTBURN: 0
HOARSE VOICE: 0
SHORTNESS OF BREATH: 0
BELCHING: 0
CHOKING: 0
EYE ITCHING: 0
EYE DISCHARGE: 0
DIARRHEA: 0
SORE THROAT: 0

## 2018-03-09 NOTE — PROGRESS NOTES
[facility] [address] [place] [id number]  Dept: [phone]  Dept Fax: [phone]    Manolo Melton is a 30 y.o. female who presents today for her medical conditions/complaints as noted below. Manolo Melton is c/o of   Chief Complaint   Patient presents with    Headache     since last night     Visit Information    Have you changed or started any medications since your last visit including any over-the-counter medicines, vitamins, or herbal medicines? no   Have you stopped taking any of your medications? Is so, why? -  no  Are you having any side effects from any of your medications? - no    Have you seen any other physician or provider since your last visit?  no   Have you had any other diagnostic tests since your last visit?  no   Have you been seen in the emergency room and/or had an admission in a hospital since we last saw you?  no   Have you had your routine dental cleaning in the past 6 months?  yes -      Do you have an active MyChart account? If no, what is the barrier? No:     Patient Care Team:  Courtney Rodriguez PA-C as PCP - General (Physician Assistant)  Romulo Panda MD as Consulting Physician (Hematology and Oncology)  Marky Mcclure DO as Obstetrician (Obstetrics & Gynecology)    Medical History Review  Past Medical, Family, and Social History reviewed and does contribute to the patient presenting condition    Health Maintenance   Topic Date Due    HIV screen  11/14/1997    Cervical cancer screen  05/14/2018    Flu vaccine  Completed    Pneumococcal med risk  Completed                 HPI:     Abdominal Pain   This is a recurrent problem. The pain is located in the generalized abdominal region. The pain is at a severity of 2/10. The pain is mild. Pertinent negatives include no arthralgias, belching, constipation, diarrhea, dysuria, fever, frequency, headaches, melena, myalgias, nausea or vomiting.  Her past medical history is significant for GERD. Hyperlipidemia   This is a chronic problem. The current episode started more than 1 year ago. She has no history of chronic renal disease, diabetes, hypothyroidism, liver disease, obesity or nephrotic syndrome. Pertinent negatives include no chest pain, focal sensory loss, focal weakness, leg pain, myalgias or shortness of breath. The current treatment provides moderate improvement of lipids. Risk factors for coronary artery disease include obesity and dyslipidemia. Gastroesophageal Reflux   She reports no abdominal pain, no belching, no chest pain, no choking, no coughing, no dysphagia, no early satiety, no globus sensation, no heartburn, no hoarse voice, no nausea or no sore throat. This is a chronic problem. Associated symptoms include fatigue. Pertinent negatives include no anemia, melena, muscle weakness or orthopnea. Past procedures do not include an abdominal ultrasound or an EGD. Past invasive treatments do not include gastroplasty. Headache    This is a chronic problem. The pain quality is similar to prior headaches. Pertinent negatives include no abdominal pain, coughing, dizziness, ear pain, fever, nausea, neck pain, numbness, rhinorrhea, sinus pressure, sore throat, vomiting or weakness. There is no history of obesity.        Hemoglobin A1C (%)   Date Value   02/12/2018 5.0             ( goal A1C is < 7)   No results found for: LABMICR  LDL Cholesterol (mg/dL)   Date Value   08/14/2017 76   08/18/2016 75   09/11/2015 80       (goal LDL is <100)   AST (U/L)   Date Value   11/30/2017 14     ALT (U/L)   Date Value   11/30/2017 10     BUN (mg/dL)   Date Value   11/30/2017 7     BP Readings from Last 3 Encounters:   03/09/18 118/76   03/05/18 118/80   02/27/18 125/76          (goal 120/80)    Past Medical History:   Diagnosis Date    Allergic rhinitis     Arthritis     Asthma     GERD (gastroesophageal reflux disease)     Heart murmur     Hyperlipidemia 9/15/2015    [facility]     Psoriasis     Pulmonary stenosis     Thyroid disease       Past Surgical History:   Procedure Laterality Date    COLONOSCOPY  01/08/2018    COLON, RANDOM BIOPSIES: MILD FOCAL ACTIVE COLITIS, HEMORRHOIDS    ENDOSCOPY, COLON, DIAGNOSTIC  01/08/2018    EGD and Colonoscopy    LEEP  2008    [facility] COLONOSCOPY W/BIOPSY SINGLE/MULTIPLE N/A 1/8/2018    COLONOSCOPY WITH BIOPSY performed by Lanny Sacks, MD at [address] EGD TRANSORAL BIOPSY SINGLE/MULTIPLE N/A 1/8/2018    EGD BIOPSY performed by Lanny Sacks, MD at [address] [address] ENDOSCOPY  01/08/2018     grade 2 esophagitis    WISDOM TOOTH EXTRACTION Right 1/8/16    WISDOM TOOTH EXTRACTION         Family History   Problem Relation Age of Onset    Diabetes Father     Other Mother      blood disorder    Other Maternal Aunt      blood disorder    Cancer Maternal Aunt      breast    Other Paternal Aunt      chiari malformation type 2     Cancer Paternal Aunt      ovarian cancer    Cancer Maternal Grandmother     Cancer Maternal Grandfather     Cancer Paternal Grandmother     High Blood Pressure Paternal Grandfather     Heart Disease Paternal Grandfather     Other Maternal Aunt      brain aneurysm       Social History   Substance Use Topics    Smoking status: Never Smoker    Smokeless tobacco: Never Used    Alcohol use 0.0 oz/week      Comment: social       Current Outpatient Prescriptions   Medication Sig Dispense Refill    amitriptyline (ELAVIL) 100 MG tablet Take 1 tablet by mouth nightly 90 tablet 1    SUMAtriptan (IMITREX) 50 MG tablet Take 1 tablet by mouth once as needed for Migraine 10 tablet 3    Cholecalciferol (VITAMIN D) 2000 units CAPS capsule Take 2,000 Units by mouth daily 30 capsule 5    atorvastatin (LIPITOR) 20 MG tablet take 1 tablet by mouth once daily 30 tablet 3    levothyroxine (SYNTHROID) 25 MCG tablet Take 1 tablet by mouth Daily 30 tablet 3    dicyclomine (BENTYL) 20 MG tablet Take 1 tablet by mouth 3 times daily as needed (abd pain) 20 tablet 0    fluticasone (FLONASE) 50 MCG/ACT nasal spray 2 sprays by Nasal route      norgestrel-ethinyl estradiol (CRYSELLE-28) 0.3-30 MG-MCG per tablet Take 1 tablet by mouth daily 28 tablet 11    promethazine (PHENERGAN) 25 MG tablet TAKE 0.5 TABLET BY MOUTH DAILY IF NEEDED FOR NAUSEA AND VOMITING  0    omeprazole (PRILOSEC) 20 MG delayed release capsule Take 1 capsule by mouth Daily 30 capsule 3    albuterol sulfate HFA (PROAIR HFA) 108 (90 BASE) MCG/ACT inhaler Inhale 2 puffs into the lungs every 6 hours as needed for Wheezing 1 Inhaler 3    ibuprofen (ADVIL;MOTRIN) 800 MG tablet Take 1 tablet by mouth every 8 hours as needed for Pain 60 tablet 5    diphenhydrAMINE (BENADRYL) 25 MG tablet Take 25 mg by mouth every 6 hours as needed for Itching       No current facility-administered medications for this visit. Allergies   Allergen Reactions    Food Hives     Oysters, clams, mussels, scallops    Bactrim [Sulfamethoxazole-Trimethoprim] Hives    Decadron [Dexamethasone]      Hives and hot flashes  Hives and hot flashes    Seasonal     Sulfamethoxazole-Trimethoprim Hives    Tetanus Toxoid, Adsorbed     Tetanus Toxoids        Health Maintenance   Topic Date Due    HIV screen  11/14/1997    Cervical cancer screen  05/14/2018    Flu vaccine  Completed    Pneumococcal med risk  Completed       Subjective:      Review of Systems   Constitutional: Positive for fatigue. Negative for chills, diaphoresis and fever. HENT: Negative for congestion, ear discharge, ear pain, hoarse voice, postnasal drip, rhinorrhea, sinus pressure and sore throat. Eyes: Negative for discharge and itching. Respiratory: Negative for cough, choking, chest tightness and shortness of breath. Cardiovascular: Negative for chest pain, palpitations and leg swelling.    Gastrointestinal: Negative for abdominal pain, constipation, diarrhea,

## 2018-04-17 ENCOUNTER — INITIAL CONSULT (OUTPATIENT)
Dept: BARIATRICS/WEIGHT MGMT | Age: 38
End: 2018-04-17
Payer: MEDICARE

## 2018-04-17 VITALS
HEART RATE: 72 BPM | DIASTOLIC BLOOD PRESSURE: 70 MMHG | RESPIRATION RATE: 20 BRPM | HEIGHT: 62 IN | WEIGHT: 194.7 LBS | BODY MASS INDEX: 35.83 KG/M2 | SYSTOLIC BLOOD PRESSURE: 110 MMHG

## 2018-04-17 DIAGNOSIS — R93.2 ABNORMAL ULTRASOUND OF GALLBLADDER: Primary | ICD-10-CM

## 2018-04-17 PROCEDURE — 99204 OFFICE O/P NEW MOD 45 MIN: CPT | Performed by: SURGERY

## 2018-04-17 PROCEDURE — 1036F TOBACCO NON-USER: CPT | Performed by: SURGERY

## 2018-04-17 PROCEDURE — G8427 DOCREV CUR MEDS BY ELIG CLIN: HCPCS | Performed by: SURGERY

## 2018-04-17 PROCEDURE — G8417 CALC BMI ABV UP PARAM F/U: HCPCS | Performed by: SURGERY

## 2018-04-17 RX ORDER — SUMATRIPTAN 50 MG/1
50 TABLET, FILM COATED ORAL
COMMUNITY
End: 2018-10-23

## 2018-04-18 ENCOUNTER — OFFICE VISIT (OUTPATIENT)
Dept: NEUROLOGY | Age: 38
End: 2018-04-18
Payer: MEDICARE

## 2018-04-18 VITALS
HEART RATE: 116 BPM | SYSTOLIC BLOOD PRESSURE: 132 MMHG | BODY MASS INDEX: 36.07 KG/M2 | DIASTOLIC BLOOD PRESSURE: 85 MMHG | WEIGHT: 196 LBS | HEIGHT: 62 IN

## 2018-04-18 DIAGNOSIS — G43.709 CHRONIC MIGRAINE W/O AURA, NOT INTRACTABLE, W/O STAT MIGR: Primary | ICD-10-CM

## 2018-04-18 DIAGNOSIS — R20.2 PARESTHESIAS: ICD-10-CM

## 2018-04-18 PROCEDURE — 99213 OFFICE O/P EST LOW 20 MIN: CPT | Performed by: PSYCHIATRY & NEUROLOGY

## 2018-04-18 PROCEDURE — G8417 CALC BMI ABV UP PARAM F/U: HCPCS | Performed by: PSYCHIATRY & NEUROLOGY

## 2018-04-18 PROCEDURE — G8427 DOCREV CUR MEDS BY ELIG CLIN: HCPCS | Performed by: PSYCHIATRY & NEUROLOGY

## 2018-04-18 PROCEDURE — 1036F TOBACCO NON-USER: CPT | Performed by: PSYCHIATRY & NEUROLOGY

## 2018-04-26 ENCOUNTER — ANESTHESIA EVENT (OUTPATIENT)
Dept: OPERATING ROOM | Age: 38
End: 2018-04-26
Payer: MEDICARE

## 2018-04-26 RX ORDER — ATORVASTATIN CALCIUM 20 MG/1
TABLET, FILM COATED ORAL
Qty: 30 TABLET | Refills: 0 | Status: SHIPPED | OUTPATIENT
Start: 2018-04-26 | End: 2018-05-21 | Stop reason: SDUPTHER

## 2018-04-26 RX ORDER — LEVOTHYROXINE SODIUM 0.03 MG/1
25 TABLET ORAL DAILY
Qty: 30 TABLET | Refills: 0 | Status: SHIPPED | OUTPATIENT
Start: 2018-04-26 | End: 2018-05-21 | Stop reason: SDUPTHER

## 2018-04-27 ENCOUNTER — HOSPITAL ENCOUNTER (OUTPATIENT)
Age: 38
Setting detail: OBSERVATION
Discharge: HOME | End: 2018-04-28
Attending: SURGERY | Admitting: SURGERY
Payer: MEDICARE

## 2018-04-27 ENCOUNTER — ANESTHESIA (OUTPATIENT)
Dept: OPERATING ROOM | Age: 38
End: 2018-04-27
Payer: MEDICARE

## 2018-04-27 VITALS — TEMPERATURE: 97.9 F | DIASTOLIC BLOOD PRESSURE: 130 MMHG | SYSTOLIC BLOOD PRESSURE: 144 MMHG | OXYGEN SATURATION: 98 %

## 2018-04-27 DIAGNOSIS — Z90.49 S/P LAPAROSCOPIC CHOLECYSTECTOMY: Primary | ICD-10-CM

## 2018-04-27 PROBLEM — Z98.890 POST-OPERATIVE NAUSEA AND VOMITING: Status: ACTIVE | Noted: 2018-04-27

## 2018-04-27 PROBLEM — R11.2 POST-OPERATIVE NAUSEA AND VOMITING: Status: ACTIVE | Noted: 2018-04-27

## 2018-04-27 LAB
EKG ATRIAL RATE: 79 BPM
EKG P AXIS: 35 DEGREES
EKG P-R INTERVAL: 148 MS
EKG Q-T INTERVAL: 406 MS
EKG QRS DURATION: 80 MS
EKG QTC CALCULATION (BAZETT): 465 MS
EKG R AXIS: 1 DEGREES
EKG T AXIS: 30 DEGREES
EKG VENTRICULAR RATE: 79 BPM
GFR NON-AFRICAN AMERICAN: >60 ML/MIN
GFR SERPL CREATININE-BSD FRML MDRD: >60 ML/MIN
GFR SERPL CREATININE-BSD FRML MDRD: NORMAL ML/MIN/{1.73_M2}
GLUCOSE BLD-MCNC: 71 MG/DL (ref 74–100)
POC CHLORIDE: 109 MMOL/L (ref 98–107)
POC CREATININE: 0.57 MG/DL (ref 0.51–1.19)
POC INR: 0.9
POC POTASSIUM: 3.8 MMOL/L (ref 3.5–4.5)
POC SODIUM: 145 MMOL/L (ref 138–146)
PROTHROMBIN TIME, POC: 11.3 SEC (ref 10.4–14.2)

## 2018-04-27 PROCEDURE — G0378 HOSPITAL OBSERVATION PER HR: HCPCS

## 2018-04-27 PROCEDURE — 3600000019 HC SURGERY ROBOT ADDTL 15MIN: Performed by: SURGERY

## 2018-04-27 PROCEDURE — C1760 CLOSURE DEV, VASC: HCPCS | Performed by: SURGERY

## 2018-04-27 PROCEDURE — 85610 PROTHROMBIN TIME: CPT

## 2018-04-27 PROCEDURE — 3700000001 HC ADD 15 MINUTES (ANESTHESIA): Performed by: SURGERY

## 2018-04-27 PROCEDURE — 2580000003 HC RX 258: Performed by: ANESTHESIOLOGY

## 2018-04-27 PROCEDURE — 6360000002 HC RX W HCPCS

## 2018-04-27 PROCEDURE — 82435 ASSAY OF BLOOD CHLORIDE: CPT

## 2018-04-27 PROCEDURE — 6360000002 HC RX W HCPCS: Performed by: ANESTHESIOLOGY

## 2018-04-27 PROCEDURE — 6360000002 HC RX W HCPCS: Performed by: SURGERY

## 2018-04-27 PROCEDURE — 82565 ASSAY OF CREATININE: CPT

## 2018-04-27 PROCEDURE — S2900 ROBOTIC SURGICAL SYSTEM: HCPCS | Performed by: SURGERY

## 2018-04-27 PROCEDURE — 3700000000 HC ANESTHESIA ATTENDED CARE: Performed by: SURGERY

## 2018-04-27 PROCEDURE — 2500000003 HC RX 250 WO HCPCS: Performed by: SURGERY

## 2018-04-27 PROCEDURE — 84703 CHORIONIC GONADOTROPIN ASSAY: CPT

## 2018-04-27 PROCEDURE — 84132 ASSAY OF SERUM POTASSIUM: CPT

## 2018-04-27 PROCEDURE — 84295 ASSAY OF SERUM SODIUM: CPT

## 2018-04-27 PROCEDURE — 6370000000 HC RX 637 (ALT 250 FOR IP): Performed by: SURGERY

## 2018-04-27 PROCEDURE — 6360000002 HC RX W HCPCS: Performed by: NURSE ANESTHETIST, CERTIFIED REGISTERED

## 2018-04-27 PROCEDURE — 96374 THER/PROPH/DIAG INJ IV PUSH: CPT

## 2018-04-27 PROCEDURE — 7100000001 HC PACU RECOVERY - ADDTL 15 MIN: Performed by: SURGERY

## 2018-04-27 PROCEDURE — 82947 ASSAY GLUCOSE BLOOD QUANT: CPT

## 2018-04-27 PROCEDURE — 47562 LAPAROSCOPIC CHOLECYSTECTOMY: CPT | Performed by: SURGERY

## 2018-04-27 PROCEDURE — 2580000003 HC RX 258: Performed by: SURGERY

## 2018-04-27 PROCEDURE — 2500000003 HC RX 250 WO HCPCS: Performed by: NURSE ANESTHETIST, CERTIFIED REGISTERED

## 2018-04-27 PROCEDURE — 88304 TISSUE EXAM BY PATHOLOGIST: CPT

## 2018-04-27 PROCEDURE — 3600000009 HC SURGERY ROBOT BASE: Performed by: SURGERY

## 2018-04-27 PROCEDURE — 64488 TAP BLOCK BI INJECTION: CPT | Performed by: ANESTHESIOLOGY

## 2018-04-27 PROCEDURE — 93005 ELECTROCARDIOGRAM TRACING: CPT

## 2018-04-27 PROCEDURE — 7100000000 HC PACU RECOVERY - FIRST 15 MIN: Performed by: SURGERY

## 2018-04-27 PROCEDURE — L0450 TLSO FLEX TRUNK/THOR PRE OTS: HCPCS | Performed by: SURGERY

## 2018-04-27 PROCEDURE — 2780000010 HC IMPLANT OTHER: Performed by: SURGERY

## 2018-04-27 RX ORDER — FENTANYL CITRATE 50 UG/ML
INJECTION, SOLUTION INTRAMUSCULAR; INTRAVENOUS
Status: COMPLETED
Start: 2018-04-27 | End: 2018-04-27

## 2018-04-27 RX ORDER — MORPHINE SULFATE 2 MG/ML
2 INJECTION, SOLUTION INTRAMUSCULAR; INTRAVENOUS
Status: DISCONTINUED | OUTPATIENT
Start: 2018-04-27 | End: 2018-04-27

## 2018-04-27 RX ORDER — LEVOTHYROXINE SODIUM 0.03 MG/1
25 TABLET ORAL DAILY
Status: DISCONTINUED | OUTPATIENT
Start: 2018-04-27 | End: 2018-04-28 | Stop reason: HOSPADM

## 2018-04-27 RX ORDER — DIPHENHYDRAMINE HYDROCHLORIDE 50 MG/ML
INJECTION INTRAMUSCULAR; INTRAVENOUS PRN
Status: DISCONTINUED | OUTPATIENT
Start: 2018-04-27 | End: 2018-04-27 | Stop reason: SDUPTHER

## 2018-04-27 RX ORDER — ACETAMINOPHEN 325 MG/1
650 TABLET ORAL EVERY 4 HOURS PRN
Status: DISCONTINUED | OUTPATIENT
Start: 2018-04-27 | End: 2018-04-28 | Stop reason: HOSPADM

## 2018-04-27 RX ORDER — FENTANYL CITRATE 50 UG/ML
50 INJECTION, SOLUTION INTRAMUSCULAR; INTRAVENOUS EVERY 5 MIN PRN
Status: DISCONTINUED | OUTPATIENT
Start: 2018-04-27 | End: 2018-04-27 | Stop reason: HOSPADM

## 2018-04-27 RX ORDER — INDOCYANINE GREEN AND WATER 25 MG
KIT INJECTION PRN
Status: DISCONTINUED | OUTPATIENT
Start: 2018-04-27 | End: 2018-04-27 | Stop reason: SDUPTHER

## 2018-04-27 RX ORDER — ALBUTEROL SULFATE 90 UG/1
2 AEROSOL, METERED RESPIRATORY (INHALATION) EVERY 6 HOURS PRN
Status: DISCONTINUED | OUTPATIENT
Start: 2018-04-27 | End: 2018-04-28 | Stop reason: HOSPADM

## 2018-04-27 RX ORDER — BUPIVACAINE HYDROCHLORIDE 2.5 MG/ML
INJECTION, SOLUTION EPIDURAL; INFILTRATION; INTRACAUDAL PRN
Status: DISCONTINUED | OUTPATIENT
Start: 2018-04-27 | End: 2018-04-27 | Stop reason: HOSPADM

## 2018-04-27 RX ORDER — ONDANSETRON 2 MG/ML
INJECTION INTRAMUSCULAR; INTRAVENOUS PRN
Status: DISCONTINUED | OUTPATIENT
Start: 2018-04-27 | End: 2018-04-27 | Stop reason: SDUPTHER

## 2018-04-27 RX ORDER — ONDANSETRON 2 MG/ML
4 INJECTION INTRAMUSCULAR; INTRAVENOUS EVERY 6 HOURS PRN
Status: DISCONTINUED | OUTPATIENT
Start: 2018-04-27 | End: 2018-04-28 | Stop reason: HOSPADM

## 2018-04-27 RX ORDER — ROCURONIUM BROMIDE 10 MG/ML
INJECTION, SOLUTION INTRAVENOUS PRN
Status: DISCONTINUED | OUTPATIENT
Start: 2018-04-27 | End: 2018-04-27 | Stop reason: SDUPTHER

## 2018-04-27 RX ORDER — PROMETHAZINE HYDROCHLORIDE 25 MG/ML
INJECTION, SOLUTION INTRAMUSCULAR; INTRAVENOUS
Status: COMPLETED
Start: 2018-04-27 | End: 2018-04-27

## 2018-04-27 RX ORDER — OXYCODONE HYDROCHLORIDE AND ACETAMINOPHEN 5; 325 MG/1; MG/1
1 TABLET ORAL EVERY 6 HOURS PRN
Qty: 28 TABLET | Refills: 0 | Status: SHIPPED | OUTPATIENT
Start: 2018-04-27 | End: 2018-05-04

## 2018-04-27 RX ORDER — FENTANYL CITRATE 50 UG/ML
25 INJECTION, SOLUTION INTRAMUSCULAR; INTRAVENOUS EVERY 5 MIN PRN
Status: DISCONTINUED | OUTPATIENT
Start: 2018-04-27 | End: 2018-04-27 | Stop reason: HOSPADM

## 2018-04-27 RX ORDER — SODIUM CHLORIDE, SODIUM LACTATE, POTASSIUM CHLORIDE, CALCIUM CHLORIDE 600; 310; 30; 20 MG/100ML; MG/100ML; MG/100ML; MG/100ML
INJECTION, SOLUTION INTRAVENOUS CONTINUOUS
Status: DISCONTINUED | OUTPATIENT
Start: 2018-04-27 | End: 2018-04-28 | Stop reason: HOSPADM

## 2018-04-27 RX ORDER — HEPARIN SODIUM 5000 [USP'U]/ML
5000 INJECTION, SOLUTION INTRAVENOUS; SUBCUTANEOUS ONCE
Status: COMPLETED | OUTPATIENT
Start: 2018-04-27 | End: 2018-04-27

## 2018-04-27 RX ORDER — MEPERIDINE HYDROCHLORIDE 50 MG/ML
12.5 INJECTION INTRAMUSCULAR; INTRAVENOUS; SUBCUTANEOUS EVERY 5 MIN PRN
Status: DISCONTINUED | OUTPATIENT
Start: 2018-04-27 | End: 2018-04-27 | Stop reason: HOSPADM

## 2018-04-27 RX ORDER — SCOLOPAMINE TRANSDERMAL SYSTEM 1 MG/1
1 PATCH, EXTENDED RELEASE TRANSDERMAL
Status: DISCONTINUED | OUTPATIENT
Start: 2018-04-27 | End: 2018-04-28 | Stop reason: HOSPADM

## 2018-04-27 RX ORDER — MIDAZOLAM HYDROCHLORIDE 1 MG/ML
2 INJECTION INTRAMUSCULAR; INTRAVENOUS ONCE
Status: COMPLETED | OUTPATIENT
Start: 2018-04-27 | End: 2018-04-27

## 2018-04-27 RX ORDER — SODIUM CHLORIDE 0.9 % (FLUSH) 0.9 %
10 SYRINGE (ML) INJECTION EVERY 12 HOURS SCHEDULED
Status: DISCONTINUED | OUTPATIENT
Start: 2018-04-27 | End: 2018-04-28 | Stop reason: HOSPADM

## 2018-04-27 RX ORDER — PROMETHAZINE HYDROCHLORIDE 25 MG/ML
6.25 INJECTION, SOLUTION INTRAMUSCULAR; INTRAVENOUS
Status: COMPLETED | OUTPATIENT
Start: 2018-04-27 | End: 2018-04-27

## 2018-04-27 RX ORDER — OXYCODONE HYDROCHLORIDE AND ACETAMINOPHEN 5; 325 MG/1; MG/1
1 TABLET ORAL EVERY 4 HOURS PRN
Status: DISCONTINUED | OUTPATIENT
Start: 2018-04-27 | End: 2018-04-28 | Stop reason: HOSPADM

## 2018-04-27 RX ORDER — MIDAZOLAM HYDROCHLORIDE 1 MG/ML
INJECTION INTRAMUSCULAR; INTRAVENOUS
Status: COMPLETED
Start: 2018-04-27 | End: 2018-04-27

## 2018-04-27 RX ORDER — OXYCODONE HYDROCHLORIDE AND ACETAMINOPHEN 5; 325 MG/1; MG/1
2 TABLET ORAL EVERY 4 HOURS PRN
Status: DISCONTINUED | OUTPATIENT
Start: 2018-04-27 | End: 2018-04-28 | Stop reason: HOSPADM

## 2018-04-27 RX ORDER — SODIUM CHLORIDE 9 MG/ML
INJECTION, SOLUTION INTRAVENOUS CONTINUOUS
Status: DISCONTINUED | OUTPATIENT
Start: 2018-04-27 | End: 2018-04-28 | Stop reason: HOSPADM

## 2018-04-27 RX ORDER — FENTANYL CITRATE 50 UG/ML
INJECTION, SOLUTION INTRAMUSCULAR; INTRAVENOUS PRN
Status: DISCONTINUED | OUTPATIENT
Start: 2018-04-27 | End: 2018-04-27 | Stop reason: SDUPTHER

## 2018-04-27 RX ORDER — PROPOFOL 10 MG/ML
INJECTION, EMULSION INTRAVENOUS PRN
Status: DISCONTINUED | OUTPATIENT
Start: 2018-04-27 | End: 2018-04-27 | Stop reason: SDUPTHER

## 2018-04-27 RX ORDER — ROPIVACAINE HYDROCHLORIDE 5 MG/ML
40 INJECTION, SOLUTION EPIDURAL; INFILTRATION; PERINEURAL ONCE
Status: DISCONTINUED | OUTPATIENT
Start: 2018-04-27 | End: 2018-04-27

## 2018-04-27 RX ORDER — ONDANSETRON 2 MG/ML
4 INJECTION INTRAMUSCULAR; INTRAVENOUS
Status: COMPLETED | OUTPATIENT
Start: 2018-04-27 | End: 2018-04-27

## 2018-04-27 RX ORDER — ONDANSETRON 4 MG/1
4 TABLET, FILM COATED ORAL EVERY 8 HOURS PRN
Qty: 30 TABLET | Refills: 1 | Status: SHIPPED | OUTPATIENT
Start: 2018-04-27 | End: 2018-09-10 | Stop reason: ALTCHOICE

## 2018-04-27 RX ORDER — FENTANYL CITRATE 50 UG/ML
100 INJECTION, SOLUTION INTRAMUSCULAR; INTRAVENOUS ONCE
Status: DISCONTINUED | OUTPATIENT
Start: 2018-04-27 | End: 2018-04-27

## 2018-04-27 RX ORDER — MAGNESIUM HYDROXIDE 1200 MG/15ML
LIQUID ORAL CONTINUOUS PRN
Status: COMPLETED | OUTPATIENT
Start: 2018-04-27 | End: 2018-04-27

## 2018-04-27 RX ORDER — KETOROLAC TROMETHAMINE 30 MG/ML
INJECTION, SOLUTION INTRAMUSCULAR; INTRAVENOUS PRN
Status: DISCONTINUED | OUTPATIENT
Start: 2018-04-27 | End: 2018-04-27 | Stop reason: SDUPTHER

## 2018-04-27 RX ORDER — LIDOCAINE HYDROCHLORIDE 10 MG/ML
INJECTION, SOLUTION EPIDURAL; INFILTRATION; INTRACAUDAL; PERINEURAL PRN
Status: DISCONTINUED | OUTPATIENT
Start: 2018-04-27 | End: 2018-04-27 | Stop reason: SDUPTHER

## 2018-04-27 RX ORDER — GLYCOPYRROLATE 1 MG/5 ML
SYRINGE (ML) INTRAVENOUS PRN
Status: DISCONTINUED | OUTPATIENT
Start: 2018-04-27 | End: 2018-04-27 | Stop reason: SDUPTHER

## 2018-04-27 RX ORDER — SODIUM CHLORIDE 0.9 % (FLUSH) 0.9 %
10 SYRINGE (ML) INJECTION PRN
Status: DISCONTINUED | OUTPATIENT
Start: 2018-04-27 | End: 2018-04-28 | Stop reason: HOSPADM

## 2018-04-27 RX ORDER — MORPHINE SULFATE 2 MG/ML
1 INJECTION, SOLUTION INTRAMUSCULAR; INTRAVENOUS
Status: DISCONTINUED | OUTPATIENT
Start: 2018-04-27 | End: 2018-04-27

## 2018-04-27 RX ORDER — ROPIVACAINE HYDROCHLORIDE 5 MG/ML
40 INJECTION, SOLUTION EPIDURAL; INFILTRATION; PERINEURAL ONCE
Status: COMPLETED | OUTPATIENT
Start: 2018-04-27 | End: 2018-04-27

## 2018-04-27 RX ORDER — FENTANYL CITRATE 50 UG/ML
50 INJECTION, SOLUTION INTRAMUSCULAR; INTRAVENOUS ONCE
Status: COMPLETED | OUTPATIENT
Start: 2018-04-27 | End: 2018-04-27

## 2018-04-27 RX ADMIN — MIDAZOLAM HYDROCHLORIDE 2 MG: 1 INJECTION INTRAMUSCULAR; INTRAVENOUS at 12:09

## 2018-04-27 RX ADMIN — NEOSTIGMINE METHYLSULFATE 2.5 MG: 1 INJECTION, SOLUTION INTRAMUSCULAR; INTRAVENOUS; SUBCUTANEOUS at 14:00

## 2018-04-27 RX ADMIN — ROCURONIUM BROMIDE 50 MG: 10 INJECTION INTRAVENOUS at 12:30

## 2018-04-27 RX ADMIN — INDOCYANINE GREEN AND WATER 5 MG: KIT at 13:44

## 2018-04-27 RX ADMIN — Medication 2 G: at 12:43

## 2018-04-27 RX ADMIN — ONDANSETRON 4 MG: 2 INJECTION INTRAMUSCULAR; INTRAVENOUS at 23:40

## 2018-04-27 RX ADMIN — FENTANYL CITRATE 50 MCG: 50 INJECTION INTRAMUSCULAR; INTRAVENOUS at 12:30

## 2018-04-27 RX ADMIN — KETOROLAC TROMETHAMINE 30 MG: 30 INJECTION, SOLUTION INTRAMUSCULAR; INTRAVENOUS at 13:59

## 2018-04-27 RX ADMIN — HEPARIN SODIUM 5000 UNITS: 5000 INJECTION, SOLUTION INTRAVENOUS; SUBCUTANEOUS at 11:29

## 2018-04-27 RX ADMIN — OXYCODONE HYDROCHLORIDE AND ACETAMINOPHEN 2 TABLET: 5; 325 TABLET ORAL at 19:32

## 2018-04-27 RX ADMIN — PROMETHAZINE HYDROCHLORIDE 6.25 MG: 25 INJECTION, SOLUTION INTRAMUSCULAR; INTRAVENOUS at 14:37

## 2018-04-27 RX ADMIN — DIPHENHYDRAMINE HYDROCHLORIDE 12.5 MG: 50 INJECTION, SOLUTION INTRAMUSCULAR; INTRAVENOUS at 12:39

## 2018-04-27 RX ADMIN — FENTANYL CITRATE 50 MCG: 50 INJECTION INTRAMUSCULAR; INTRAVENOUS at 13:36

## 2018-04-27 RX ADMIN — HYDROMORPHONE HYDROCHLORIDE 0.5 MG: 1 INJECTION, SOLUTION INTRAMUSCULAR; INTRAVENOUS; SUBCUTANEOUS at 16:45

## 2018-04-27 RX ADMIN — FENTANYL CITRATE 50 MCG: 50 INJECTION, SOLUTION INTRAMUSCULAR; INTRAVENOUS at 12:09

## 2018-04-27 RX ADMIN — FENTANYL CITRATE 50 MCG: 50 INJECTION INTRAMUSCULAR; INTRAVENOUS at 13:07

## 2018-04-27 RX ADMIN — Medication 0.5 MG: at 16:45

## 2018-04-27 RX ADMIN — PROPOFOL 150 MG: 10 INJECTION, EMULSION INTRAVENOUS at 12:30

## 2018-04-27 RX ADMIN — SODIUM CHLORIDE: 9 INJECTION, SOLUTION INTRAVENOUS at 17:57

## 2018-04-27 RX ADMIN — FENTANYL CITRATE 50 MCG: 50 INJECTION, SOLUTION INTRAMUSCULAR; INTRAVENOUS at 15:03

## 2018-04-27 RX ADMIN — FENTANYL CITRATE 50 MCG: 50 INJECTION, SOLUTION INTRAMUSCULAR; INTRAVENOUS at 15:19

## 2018-04-27 RX ADMIN — FENTANYL CITRATE 50 MCG: 50 INJECTION INTRAMUSCULAR; INTRAVENOUS at 12:09

## 2018-04-27 RX ADMIN — MIDAZOLAM HYDROCHLORIDE 2 MG: 1 INJECTION, SOLUTION INTRAMUSCULAR; INTRAVENOUS at 12:09

## 2018-04-27 RX ADMIN — ONDANSETRON 4 MG: 2 INJECTION INTRAMUSCULAR; INTRAVENOUS at 12:39

## 2018-04-27 RX ADMIN — PROPOFOL 50 MG: 10 INJECTION, EMULSION INTRAVENOUS at 12:34

## 2018-04-27 RX ADMIN — SODIUM CHLORIDE, POTASSIUM CHLORIDE, SODIUM LACTATE AND CALCIUM CHLORIDE: 600; 310; 30; 20 INJECTION, SOLUTION INTRAVENOUS at 11:30

## 2018-04-27 RX ADMIN — ONDANSETRON 4 MG: 2 INJECTION, SOLUTION INTRAMUSCULAR; INTRAVENOUS at 15:47

## 2018-04-27 RX ADMIN — Medication 0.5 MG: at 15:52

## 2018-04-27 RX ADMIN — LIDOCAINE HYDROCHLORIDE 50 MG: 10 INJECTION, SOLUTION EPIDURAL; INFILTRATION; INTRACAUDAL; PERINEURAL at 12:30

## 2018-04-27 RX ADMIN — PROMETHAZINE HYDROCHLORIDE 6.25 MG: 25 INJECTION INTRAMUSCULAR; INTRAVENOUS at 14:37

## 2018-04-27 RX ADMIN — FENTANYL CITRATE 50 MCG: 50 INJECTION, SOLUTION INTRAMUSCULAR; INTRAVENOUS at 14:40

## 2018-04-27 RX ADMIN — HYDROMORPHONE HYDROCHLORIDE 0.5 MG: 1 INJECTION, SOLUTION INTRAMUSCULAR; INTRAVENOUS; SUBCUTANEOUS at 15:52

## 2018-04-27 RX ADMIN — SODIUM CHLORIDE, POTASSIUM CHLORIDE, SODIUM LACTATE AND CALCIUM CHLORIDE: 600; 310; 30; 20 INJECTION, SOLUTION INTRAVENOUS at 13:32

## 2018-04-27 RX ADMIN — FENTANYL CITRATE 50 MCG: 50 INJECTION INTRAMUSCULAR; INTRAVENOUS at 12:37

## 2018-04-27 RX ADMIN — Medication 0.4 MG: at 14:00

## 2018-04-27 RX ADMIN — Medication 40 ML: at 12:19

## 2018-04-27 RX ADMIN — ONDANSETRON 4 MG: 2 INJECTION INTRAMUSCULAR; INTRAVENOUS at 14:02

## 2018-04-27 ASSESSMENT — PAIN SCALES - GENERAL
PAINLEVEL_OUTOF10: 9
PAINLEVEL_OUTOF10: 7
PAINLEVEL_OUTOF10: 8
PAINLEVEL_OUTOF10: 7
PAINLEVEL_OUTOF10: 1
PAINLEVEL_OUTOF10: 1
PAINLEVEL_OUTOF10: 10
PAINLEVEL_OUTOF10: 9

## 2018-04-27 ASSESSMENT — PULMONARY FUNCTION TESTS
PIF_VALUE: 3
PIF_VALUE: 24
PIF_VALUE: 14
PIF_VALUE: 23
PIF_VALUE: 18
PIF_VALUE: 19
PIF_VALUE: 23
PIF_VALUE: 25
PIF_VALUE: 1
PIF_VALUE: 24
PIF_VALUE: 23
PIF_VALUE: 25
PIF_VALUE: 3
PIF_VALUE: 18
PIF_VALUE: 21
PIF_VALUE: 24
PIF_VALUE: 23
PIF_VALUE: 24
PIF_VALUE: 24
PIF_VALUE: 4
PIF_VALUE: 23
PIF_VALUE: 21
PIF_VALUE: 0
PIF_VALUE: 18
PIF_VALUE: 19
PIF_VALUE: 24
PIF_VALUE: 23
PIF_VALUE: 15
PIF_VALUE: 25
PIF_VALUE: 24
PIF_VALUE: 18
PIF_VALUE: 24
PIF_VALUE: 20
PIF_VALUE: 19
PIF_VALUE: 2
PIF_VALUE: 24
PIF_VALUE: 23
PIF_VALUE: 1
PIF_VALUE: 21
PIF_VALUE: 24
PIF_VALUE: 18
PIF_VALUE: 7
PIF_VALUE: 24
PIF_VALUE: 19
PIF_VALUE: 27
PIF_VALUE: 0
PIF_VALUE: 19
PIF_VALUE: 24
PIF_VALUE: 21
PIF_VALUE: 25
PIF_VALUE: 24
PIF_VALUE: 2
PIF_VALUE: 24
PIF_VALUE: 18
PIF_VALUE: 23
PIF_VALUE: 4
PIF_VALUE: 24
PIF_VALUE: 25
PIF_VALUE: 24
PIF_VALUE: 23
PIF_VALUE: 25
PIF_VALUE: 23
PIF_VALUE: 21
PIF_VALUE: 1
PIF_VALUE: 25
PIF_VALUE: 23
PIF_VALUE: 20
PIF_VALUE: 23
PIF_VALUE: 25
PIF_VALUE: 28
PIF_VALUE: 1
PIF_VALUE: 24
PIF_VALUE: 23
PIF_VALUE: 19
PIF_VALUE: 23
PIF_VALUE: 24
PIF_VALUE: 27
PIF_VALUE: 29
PIF_VALUE: 1
PIF_VALUE: 22
PIF_VALUE: 23
PIF_VALUE: 19
PIF_VALUE: 19
PIF_VALUE: 25
PIF_VALUE: 18
PIF_VALUE: 24
PIF_VALUE: 24
PIF_VALUE: 25
PIF_VALUE: 23
PIF_VALUE: 24
PIF_VALUE: 0
PIF_VALUE: 24
PIF_VALUE: 25
PIF_VALUE: 23
PIF_VALUE: 13
PIF_VALUE: 21
PIF_VALUE: 25
PIF_VALUE: 25
PIF_VALUE: 19
PIF_VALUE: 23
PIF_VALUE: 21
PIF_VALUE: 1
PIF_VALUE: 23
PIF_VALUE: 3
PIF_VALUE: 18
PIF_VALUE: 25

## 2018-04-27 ASSESSMENT — PAIN DESCRIPTION - ONSET: ONSET: ON-GOING

## 2018-04-27 ASSESSMENT — PAIN DESCRIPTION - LOCATION: LOCATION: ABDOMEN

## 2018-04-27 ASSESSMENT — PAIN - FUNCTIONAL ASSESSMENT: PAIN_FUNCTIONAL_ASSESSMENT: 0-10

## 2018-04-27 ASSESSMENT — PAIN DESCRIPTION - FREQUENCY: FREQUENCY: CONTINUOUS

## 2018-04-27 ASSESSMENT — PAIN DESCRIPTION - ORIENTATION: ORIENTATION: LEFT

## 2018-04-27 ASSESSMENT — PAIN DESCRIPTION - PROGRESSION: CLINICAL_PROGRESSION: NOT CHANGED

## 2018-04-27 ASSESSMENT — PAIN DESCRIPTION - PAIN TYPE: TYPE: SURGICAL PAIN

## 2018-04-27 ASSESSMENT — PAIN DESCRIPTION - DESCRIPTORS: DESCRIPTORS: SHARP

## 2018-04-28 VITALS
RESPIRATION RATE: 16 BRPM | TEMPERATURE: 99.6 F | HEIGHT: 62 IN | OXYGEN SATURATION: 91 % | SYSTOLIC BLOOD PRESSURE: 124 MMHG | DIASTOLIC BLOOD PRESSURE: 86 MMHG | HEART RATE: 92 BPM | WEIGHT: 195 LBS | BODY MASS INDEX: 35.88 KG/M2

## 2018-04-28 LAB
HCT VFR BLD CALC: 38.8 % (ref 36.3–47.1)
HEMOGLOBIN: 12.2 G/DL (ref 11.9–15.1)
MCH RBC QN AUTO: 28.1 PG (ref 25.2–33.5)
MCHC RBC AUTO-ENTMCNC: 31.4 G/DL (ref 28.4–34.8)
MCV RBC AUTO: 89.4 FL (ref 82.6–102.9)
NRBC AUTOMATED: 0 PER 100 WBC
PDW BLD-RTO: 13.7 % (ref 11.8–14.4)
PLATELET # BLD: 339 K/UL (ref 138–453)
PMV BLD AUTO: 9.5 FL (ref 8.1–13.5)
RBC # BLD: 4.34 M/UL (ref 3.95–5.11)
WBC # BLD: 17 K/UL (ref 3.5–11.3)

## 2018-04-28 PROCEDURE — 94762 N-INVAS EAR/PLS OXIMTRY CONT: CPT

## 2018-04-28 PROCEDURE — 96376 TX/PRO/DX INJ SAME DRUG ADON: CPT

## 2018-04-28 PROCEDURE — G0378 HOSPITAL OBSERVATION PER HR: HCPCS

## 2018-04-28 PROCEDURE — 6370000000 HC RX 637 (ALT 250 FOR IP): Performed by: SURGERY

## 2018-04-28 PROCEDURE — 6360000002 HC RX W HCPCS: Performed by: SURGERY

## 2018-04-28 PROCEDURE — 36415 COLL VENOUS BLD VENIPUNCTURE: CPT

## 2018-04-28 PROCEDURE — 85027 COMPLETE CBC AUTOMATED: CPT

## 2018-04-28 RX ADMIN — ONDANSETRON 4 MG: 2 INJECTION INTRAMUSCULAR; INTRAVENOUS at 13:35

## 2018-04-28 RX ADMIN — OXYCODONE HYDROCHLORIDE AND ACETAMINOPHEN 1 TABLET: 5; 325 TABLET ORAL at 08:18

## 2018-04-28 RX ADMIN — OXYCODONE HYDROCHLORIDE AND ACETAMINOPHEN 1 TABLET: 5; 325 TABLET ORAL at 12:46

## 2018-04-28 RX ADMIN — OXYCODONE HYDROCHLORIDE AND ACETAMINOPHEN 1 TABLET: 5; 325 TABLET ORAL at 02:11

## 2018-04-28 ASSESSMENT — PAIN SCALES - GENERAL
PAINLEVEL_OUTOF10: 5
PAINLEVEL_OUTOF10: 6
PAINLEVEL_OUTOF10: 6
PAINLEVEL_OUTOF10: 5
PAINLEVEL_OUTOF10: 7

## 2018-04-28 ASSESSMENT — PAIN DESCRIPTION - ONSET: ONSET: ON-GOING

## 2018-04-28 ASSESSMENT — PAIN DESCRIPTION - PROGRESSION: CLINICAL_PROGRESSION: GRADUALLY IMPROVING

## 2018-04-28 ASSESSMENT — PAIN DESCRIPTION - DESCRIPTORS: DESCRIPTORS: SHARP

## 2018-04-28 ASSESSMENT — PAIN DESCRIPTION - FREQUENCY: FREQUENCY: CONTINUOUS

## 2018-04-28 ASSESSMENT — PAIN DESCRIPTION - LOCATION: LOCATION: ABDOMEN

## 2018-04-28 ASSESSMENT — PAIN DESCRIPTION - ORIENTATION: ORIENTATION: LEFT

## 2018-04-28 ASSESSMENT — PAIN DESCRIPTION - PAIN TYPE: TYPE: SURGICAL PAIN

## 2018-04-29 ENCOUNTER — CARE COORDINATION (OUTPATIENT)
Dept: CASE MANAGEMENT | Age: 38
End: 2018-04-29

## 2018-04-29 DIAGNOSIS — K80.20 SYMPTOMATIC CHOLELITHIASIS: Primary | ICD-10-CM

## 2018-04-29 PROCEDURE — 1111F DSCHRG MED/CURRENT MED MERGE: CPT | Performed by: PHYSICIAN ASSISTANT

## 2018-04-30 LAB — HCG, PREGNANCY URINE (POC): NEGATIVE

## 2018-05-01 ENCOUNTER — OFFICE VISIT (OUTPATIENT)
Dept: BARIATRICS/WEIGHT MGMT | Age: 38
End: 2018-05-01

## 2018-05-01 VITALS
RESPIRATION RATE: 20 BRPM | WEIGHT: 197 LBS | HEIGHT: 62 IN | BODY MASS INDEX: 36.25 KG/M2 | DIASTOLIC BLOOD PRESSURE: 88 MMHG | SYSTOLIC BLOOD PRESSURE: 126 MMHG | HEART RATE: 98 BPM

## 2018-05-01 DIAGNOSIS — K80.20 SYMPTOMATIC CHOLELITHIASIS: Primary | ICD-10-CM

## 2018-05-01 LAB — SURGICAL PATHOLOGY REPORT: NORMAL

## 2018-05-01 PROCEDURE — 99024 POSTOP FOLLOW-UP VISIT: CPT | Performed by: SURGERY

## 2018-05-04 ENCOUNTER — OFFICE VISIT (OUTPATIENT)
Dept: FAMILY MEDICINE CLINIC | Age: 38
End: 2018-05-04
Payer: MEDICARE

## 2018-05-04 VITALS
WEIGHT: 196 LBS | OXYGEN SATURATION: 98 % | DIASTOLIC BLOOD PRESSURE: 75 MMHG | HEART RATE: 104 BPM | BODY MASS INDEX: 36.07 KG/M2 | SYSTOLIC BLOOD PRESSURE: 112 MMHG | TEMPERATURE: 98.6 F | HEIGHT: 62 IN

## 2018-05-04 DIAGNOSIS — J45.909 MILD ASTHMA WITHOUT COMPLICATION, UNSPECIFIED WHETHER PERSISTENT: ICD-10-CM

## 2018-05-04 DIAGNOSIS — Z48.89 ENCOUNTER FOR POST SURGICAL WOUND CHECK: Primary | ICD-10-CM

## 2018-05-04 DIAGNOSIS — Z90.49 S/P CHOLECYSTECTOMY: ICD-10-CM

## 2018-05-04 PROCEDURE — 1036F TOBACCO NON-USER: CPT | Performed by: PHYSICIAN ASSISTANT

## 2018-05-04 PROCEDURE — G8427 DOCREV CUR MEDS BY ELIG CLIN: HCPCS | Performed by: PHYSICIAN ASSISTANT

## 2018-05-04 PROCEDURE — 99214 OFFICE O/P EST MOD 30 MIN: CPT | Performed by: PHYSICIAN ASSISTANT

## 2018-05-04 PROCEDURE — G8417 CALC BMI ABV UP PARAM F/U: HCPCS | Performed by: PHYSICIAN ASSISTANT

## 2018-05-15 ASSESSMENT — ENCOUNTER SYMPTOMS
SINUS PRESSURE: 0
SHORTNESS OF BREATH: 0
CHOKING: 0
HOARSE VOICE: 0
CONSTIPATION: 0
CHEST TIGHTNESS: 0
VOMITING: 0
COUGH: 0
GLOBUS SENSATION: 0
BELCHING: 0
DIARRHEA: 0
EYE ITCHING: 0
EYE DISCHARGE: 0
HEARTBURN: 0
SORE THROAT: 0
DIFFICULTY BREATHING: 0
ABDOMINAL PAIN: 0
NAUSEA: 0
RHINORRHEA: 0

## 2018-05-18 ENCOUNTER — TELEPHONE (OUTPATIENT)
Dept: BARIATRICS/WEIGHT MGMT | Age: 38
End: 2018-05-18

## 2018-05-22 RX ORDER — LEVOTHYROXINE SODIUM 0.03 MG/1
25 TABLET ORAL DAILY
Qty: 90 TABLET | Refills: 1 | Status: SHIPPED | OUTPATIENT
Start: 2018-05-22 | End: 2019-01-31 | Stop reason: ALTCHOICE

## 2018-05-22 RX ORDER — ATORVASTATIN CALCIUM 20 MG/1
TABLET, FILM COATED ORAL
Qty: 90 TABLET | Refills: 1 | Status: SHIPPED | OUTPATIENT
Start: 2018-05-22 | End: 2018-11-09 | Stop reason: SDUPTHER

## 2018-06-22 RX ORDER — GLUCOSAMINE/CHONDR SU A SOD 750-600 MG
TABLET ORAL
Qty: 90 CAPSULE | Refills: 1 | Status: SHIPPED | OUTPATIENT
Start: 2018-06-22 | End: 2018-10-23 | Stop reason: SDUPTHER

## 2018-07-25 ENCOUNTER — HOSPITAL ENCOUNTER (OUTPATIENT)
Facility: MEDICAL CENTER | Age: 38
Discharge: HOME | End: 2018-07-25
Payer: MEDICARE

## 2018-07-25 ENCOUNTER — TELEPHONE (OUTPATIENT)
Dept: ONCOLOGY | Age: 38
End: 2018-07-25

## 2018-07-25 ENCOUNTER — OFFICE VISIT (OUTPATIENT)
Dept: ONCOLOGY | Age: 38
End: 2018-07-25
Payer: MEDICARE

## 2018-07-25 VITALS
RESPIRATION RATE: 18 BRPM | TEMPERATURE: 98.3 F | SYSTOLIC BLOOD PRESSURE: 143 MMHG | HEART RATE: 83 BPM | DIASTOLIC BLOOD PRESSURE: 95 MMHG | BODY MASS INDEX: 36.58 KG/M2 | WEIGHT: 200 LBS

## 2018-07-25 DIAGNOSIS — K80.20 SYMPTOMATIC CHOLELITHIASIS: Primary | ICD-10-CM

## 2018-07-25 DIAGNOSIS — K80.20 SYMPTOMATIC CHOLELITHIASIS: ICD-10-CM

## 2018-07-25 LAB
ABSOLUTE EOS #: 0.1 K/UL (ref 0–0.4)
ABSOLUTE IMMATURE GRANULOCYTE: ABNORMAL K/UL (ref 0–0.3)
ABSOLUTE LYMPH #: 3 K/UL (ref 1–4.8)
ABSOLUTE MONO #: 0.9 K/UL (ref 0.2–0.8)
BASOPHILS # BLD: 0 % (ref 0–2)
BASOPHILS ABSOLUTE: 0 K/UL (ref 0–0.2)
DIFFERENTIAL TYPE: ABNORMAL
EOSINOPHILS RELATIVE PERCENT: 1 % (ref 1–4)
HCT VFR BLD CALC: 44.3 % (ref 36–46)
HEMOGLOBIN: 14.5 G/DL (ref 12–16)
IMMATURE GRANULOCYTES: ABNORMAL %
LYMPHOCYTES # BLD: 23 % (ref 24–44)
MCH RBC QN AUTO: 28.7 PG (ref 26–34)
MCHC RBC AUTO-ENTMCNC: 32.8 G/DL (ref 31–37)
MCV RBC AUTO: 87.6 FL (ref 80–100)
MONOCYTES # BLD: 7 % (ref 1–7)
NRBC AUTOMATED: ABNORMAL PER 100 WBC
PDW BLD-RTO: 13.9 % (ref 11.5–14.5)
PLATELET # BLD: 410 K/UL (ref 130–400)
PLATELET ESTIMATE: ABNORMAL
PMV BLD AUTO: 7.1 FL (ref 6–12)
RBC # BLD: 5.06 M/UL (ref 4–5.2)
RBC # BLD: ABNORMAL 10*6/UL
SEG NEUTROPHILS: 69 % (ref 36–66)
SEGMENTED NEUTROPHILS ABSOLUTE COUNT: 9.2 K/UL (ref 1.8–7.7)
WBC # BLD: 13.3 K/UL (ref 3.5–11)
WBC # BLD: ABNORMAL 10*3/UL

## 2018-07-25 PROCEDURE — G8417 CALC BMI ABV UP PARAM F/U: HCPCS | Performed by: INTERNAL MEDICINE

## 2018-07-25 PROCEDURE — 36415 COLL VENOUS BLD VENIPUNCTURE: CPT

## 2018-07-25 PROCEDURE — 1036F TOBACCO NON-USER: CPT | Performed by: INTERNAL MEDICINE

## 2018-07-25 PROCEDURE — G8427 DOCREV CUR MEDS BY ELIG CLIN: HCPCS | Performed by: INTERNAL MEDICINE

## 2018-07-25 PROCEDURE — 85025 COMPLETE CBC W/AUTO DIFF WBC: CPT

## 2018-07-25 PROCEDURE — 99214 OFFICE O/P EST MOD 30 MIN: CPT | Performed by: INTERNAL MEDICINE

## 2018-07-25 PROCEDURE — 99211 OFF/OP EST MAY X REQ PHY/QHP: CPT | Performed by: INTERNAL MEDICINE

## 2018-07-25 RX ORDER — RANITIDINE 150 MG/1
150 TABLET ORAL 2 TIMES DAILY
COMMUNITY
End: 2018-09-10

## 2018-07-25 NOTE — PROGRESS NOTES
Patient ID: Corrinne Rain, 1980, [id number], 30 y.o. Diagnosis:   Leukocytosis  Negative flow cytometry, CRP, AUGUSTO, TRACIE 2 mutation and FISH  HISTORY OF PRESENT ILLNESS:    Medical History:   Ms Alon Rosenberg is a 30 year pleasant percussion female with a family history significant for polycythemia in her mother was seen during initial consultation visit for high white blood cell count. Patient states that she never had a blood work for past several years and recently went to her primary care physician for regular checkup and had lab work which revealed high white cell count of about 17,000 in May of this year. The repeat count in June showed white cell count about 12,000. There for she was referred to our hematology clinic for further evaluation. Patient states that her mother was diagnosed with polycythemia vera and also her maternal aunts had history of platelet disorders. She has smoked occasionally until 2006 but currently nonsmoker. Occasional alcohol drinking. She has history of 2 miscarriages in herself but denied any venous or arterial thrombosis. No family history of thrombosis. She denied any unintentional weight loss, no drenching night sweats, no fever chills. Otherwise she is feeling good. She does complain of some joint aches and pains for which she takes occasional ibuprofen. INTERVAL HISTORY:  Patient is returning for follow-up visit for leukocytosis. She reports she had a gallbladder surgery in April 2018. She denied any drenching night sweats, fever chills. Her most recent WBC is 13.3K. She is feeling well. She denied any new symptoms. During this visit patient's allergy, social, medical, surgical history and medications were reviewed and updated.     Allergies   Allergen Reactions    Food Hives     Oysters, clams, mussels, scallops    Bactrim [Sulfamethoxazole-Trimethoprim] Hives    Decadron [Dexamethasone]      Hives and hot flashes      Seasonal     Tetanus Toxoids lymphocyte count is 3K. Patient had a family history of polycythemia. Her JAK2 mutation, fish for BCR able, and flow cytometry results are negative. She has a high CRP 20.6 which suggest possibly chronic inflammation from her arthritis and may be causing reactive leukocytosis. As her white cell count is within normal limit today I will plan to repeat CBC in 12 months. Consideration for bone marrow biopsy depending on change in her blood count. patient's questions were sought and answered to her satisfaction. She agreed to proceed with the plan above.        PLAN:   Her white cell count is stable at 13.3  I will repeat CBC in 12 months from now    if her counts gets worse we will plan for bone marrow biopsy    Salinas Rodriguez MD  Hematology/Oncology    CC;  Julieta Rincon PA-C

## 2018-07-25 NOTE — TELEPHONE ENCOUNTER
PATIENT ARRIVED TO [facility] MD VISIT. DR. Tree Ruiz IN TO SEE PATIENT. ORDERS RECEIVED. CBC 6 MONTHS, LATE JAN EARLY FEB. RV 12 MONTHS. END OF July 2019. GENERIC SCHEDULE REMINDER. AVS PRINTED AND GIVEN TO PATIENT WITH INSTRUCTIONS. PATIENT DISCHARGED FROM CLINIC AMBULATORY.

## 2018-08-31 ENCOUNTER — HOSPITAL ENCOUNTER (OUTPATIENT)
Age: 38
Discharge: HOME | End: 2018-08-31
Payer: MEDICARE

## 2018-08-31 DIAGNOSIS — Z11.4 ENCOUNTER FOR SCREENING FOR HIV: ICD-10-CM

## 2018-08-31 DIAGNOSIS — D72.829 LEUKOCYTOSIS, UNSPECIFIED TYPE: ICD-10-CM

## 2018-08-31 DIAGNOSIS — R79.89 ABNORMAL TSH: ICD-10-CM

## 2018-08-31 DIAGNOSIS — Z00.00 ANNUAL PHYSICAL EXAM: ICD-10-CM

## 2018-08-31 DIAGNOSIS — E78.5 HYPERLIPIDEMIA, UNSPECIFIED HYPERLIPIDEMIA TYPE: Chronic | ICD-10-CM

## 2018-08-31 DIAGNOSIS — E55.9 VITAMIN D DEFICIENCY: ICD-10-CM

## 2018-08-31 DIAGNOSIS — R51.9 NONINTRACTABLE HEADACHE, UNSPECIFIED CHRONICITY PATTERN, UNSPECIFIED HEADACHE TYPE: ICD-10-CM

## 2018-08-31 LAB
ABSOLUTE EOS #: 0.07 K/UL (ref 0–0.44)
ABSOLUTE IMMATURE GRANULOCYTE: 0.03 K/UL (ref 0–0.3)
ABSOLUTE LYMPH #: 3.64 K/UL (ref 1.1–3.7)
ABSOLUTE MONO #: 0.96 K/UL (ref 0.1–1.2)
ALBUMIN SERPL-MCNC: 4 G/DL (ref 3.5–5.2)
ALBUMIN/GLOBULIN RATIO: 1.2 (ref 1–2.5)
ALP BLD-CCNC: 74 U/L (ref 35–104)
ALT SERPL-CCNC: 10 U/L (ref 5–33)
ANION GAP SERPL CALCULATED.3IONS-SCNC: 13 MMOL/L (ref 9–17)
AST SERPL-CCNC: 15 U/L
BASOPHILS # BLD: 0 % (ref 0–2)
BASOPHILS ABSOLUTE: 0.03 K/UL (ref 0–0.2)
BILIRUB SERPL-MCNC: 1.37 MG/DL (ref 0.3–1.2)
BUN BLDV-MCNC: 9 MG/DL (ref 6–20)
BUN/CREAT BLD: ABNORMAL (ref 9–20)
CALCIUM SERPL-MCNC: 8.9 MG/DL (ref 8.6–10.4)
CHLORIDE BLD-SCNC: 104 MMOL/L (ref 98–107)
CHOLESTEROL/HDL RATIO: 2.5
CHOLESTEROL: 138 MG/DL
CO2: 22 MMOL/L (ref 20–31)
CREAT SERPL-MCNC: 0.51 MG/DL (ref 0.5–0.9)
DIFFERENTIAL TYPE: ABNORMAL
EOSINOPHILS RELATIVE PERCENT: 1 % (ref 1–4)
GFR AFRICAN AMERICAN: >60 ML/MIN
GFR NON-AFRICAN AMERICAN: >60 ML/MIN
GFR SERPL CREATININE-BSD FRML MDRD: ABNORMAL ML/MIN/{1.73_M2}
GFR SERPL CREATININE-BSD FRML MDRD: ABNORMAL ML/MIN/{1.73_M2}
GLUCOSE BLD-MCNC: 76 MG/DL (ref 70–99)
HCT VFR BLD CALC: 44.4 % (ref 36.3–47.1)
HDLC SERPL-MCNC: 55 MG/DL
HEMOGLOBIN: 14.2 G/DL (ref 11.9–15.1)
HIV AG/AB: NONREACTIVE
IMMATURE GRANULOCYTES: 0 %
INSULIN COMMENT: NORMAL
INSULIN REFERENCE RANGE:: NORMAL
INSULIN: 10.4 MU/L
LDL CHOLESTEROL: 58 MG/DL (ref 0–130)
LYMPHOCYTES # BLD: 30 % (ref 24–43)
MCH RBC QN AUTO: 28.5 PG (ref 25.2–33.5)
MCHC RBC AUTO-ENTMCNC: 32 G/DL (ref 28.4–34.8)
MCV RBC AUTO: 89.2 FL (ref 82.6–102.9)
MONOCYTES # BLD: 8 % (ref 3–12)
NRBC AUTOMATED: 0 PER 100 WBC
PDW BLD-RTO: 13.6 % (ref 11.8–14.4)
PLATELET # BLD: 367 K/UL (ref 138–453)
PLATELET ESTIMATE: ABNORMAL
PMV BLD AUTO: 9.9 FL (ref 8.1–13.5)
POTASSIUM SERPL-SCNC: 4 MMOL/L (ref 3.7–5.3)
RBC # BLD: 4.98 M/UL (ref 3.95–5.11)
RBC # BLD: ABNORMAL 10*6/UL
SEG NEUTROPHILS: 61 % (ref 36–65)
SEGMENTED NEUTROPHILS ABSOLUTE COUNT: 7.27 K/UL (ref 1.5–8.1)
SODIUM BLD-SCNC: 139 MMOL/L (ref 135–144)
TOTAL PROTEIN: 7.3 G/DL (ref 6.4–8.3)
TRIGL SERPL-MCNC: 127 MG/DL
TSH SERPL DL<=0.05 MIU/L-ACNC: 2.87 MIU/L (ref 0.3–5)
VITAMIN D 25-HYDROXY: 37.6 NG/ML (ref 30–100)
VLDLC SERPL CALC-MCNC: NORMAL MG/DL (ref 1–30)
WBC # BLD: 12 K/UL (ref 3.5–11.3)
WBC # BLD: ABNORMAL 10*3/UL

## 2018-08-31 PROCEDURE — 87389 HIV-1 AG W/HIV-1&-2 AB AG IA: CPT

## 2018-08-31 PROCEDURE — 85025 COMPLETE CBC W/AUTO DIFF WBC: CPT

## 2018-08-31 PROCEDURE — 83525 ASSAY OF INSULIN: CPT

## 2018-08-31 PROCEDURE — 80053 COMPREHEN METABOLIC PANEL: CPT

## 2018-08-31 PROCEDURE — 80061 LIPID PANEL: CPT

## 2018-08-31 PROCEDURE — 84443 ASSAY THYROID STIM HORMONE: CPT

## 2018-08-31 PROCEDURE — 36415 COLL VENOUS BLD VENIPUNCTURE: CPT

## 2018-08-31 PROCEDURE — 82306 VITAMIN D 25 HYDROXY: CPT

## 2018-09-06 ENCOUNTER — HOSPITAL ENCOUNTER (EMERGENCY)
Age: 38
Discharge: HOME | End: 2018-09-07
Attending: EMERGENCY MEDICINE
Payer: MEDICARE

## 2018-09-06 VITALS
OXYGEN SATURATION: 99 % | WEIGHT: 200 LBS | TEMPERATURE: 98.1 F | HEART RATE: 88 BPM | BODY MASS INDEX: 36.8 KG/M2 | HEIGHT: 62 IN | DIASTOLIC BLOOD PRESSURE: 90 MMHG | RESPIRATION RATE: 18 BRPM | SYSTOLIC BLOOD PRESSURE: 147 MMHG

## 2018-09-06 DIAGNOSIS — G43.909 MIGRAINE WITHOUT STATUS MIGRAINOSUS, NOT INTRACTABLE, UNSPECIFIED MIGRAINE TYPE: Primary | ICD-10-CM

## 2018-09-06 PROCEDURE — 99283 EMERGENCY DEPT VISIT LOW MDM: CPT

## 2018-09-06 RX ORDER — DIPHENHYDRAMINE HYDROCHLORIDE 50 MG/ML
25 INJECTION INTRAMUSCULAR; INTRAVENOUS ONCE
Status: COMPLETED | OUTPATIENT
Start: 2018-09-06 | End: 2018-09-07

## 2018-09-06 RX ORDER — KETOROLAC TROMETHAMINE 30 MG/ML
60 INJECTION, SOLUTION INTRAMUSCULAR; INTRAVENOUS ONCE
Status: COMPLETED | OUTPATIENT
Start: 2018-09-06 | End: 2018-09-07

## 2018-09-06 RX ORDER — PROMETHAZINE HYDROCHLORIDE 25 MG/ML
25 INJECTION, SOLUTION INTRAMUSCULAR; INTRAVENOUS ONCE
Status: COMPLETED | OUTPATIENT
Start: 2018-09-06 | End: 2018-09-07

## 2018-09-06 ASSESSMENT — PAIN SCALES - GENERAL: PAINLEVEL_OUTOF10: 2

## 2018-09-06 ASSESSMENT — PAIN DESCRIPTION - LOCATION: LOCATION: FACE

## 2018-09-06 ASSESSMENT — PAIN DESCRIPTION - DESCRIPTORS: DESCRIPTORS: NUMBNESS

## 2018-09-06 ASSESSMENT — PAIN DESCRIPTION - ORIENTATION: ORIENTATION: RIGHT

## 2018-09-07 LAB
CHP ED QC CHECK: NORMAL
PREGNANCY TEST URINE, POC: NEGATIVE

## 2018-09-07 PROCEDURE — 6360000002 HC RX W HCPCS: Performed by: EMERGENCY MEDICINE

## 2018-09-07 PROCEDURE — 96372 THER/PROPH/DIAG INJ SC/IM: CPT

## 2018-09-07 RX ADMIN — PROMETHAZINE HYDROCHLORIDE 25 MG: 25 INJECTION, SOLUTION INTRAMUSCULAR; INTRAVENOUS at 00:19

## 2018-09-07 RX ADMIN — DIPHENHYDRAMINE HYDROCHLORIDE 25 MG: 50 INJECTION, SOLUTION INTRAMUSCULAR; INTRAVENOUS at 00:18

## 2018-09-07 RX ADMIN — KETOROLAC TROMETHAMINE 60 MG: 60 INJECTION, SOLUTION INTRAMUSCULAR at 00:19

## 2018-09-07 ASSESSMENT — ENCOUNTER SYMPTOMS
COUGH: 0
SORE THROAT: 0
BACK PAIN: 0
ABDOMINAL PAIN: 0
DIARRHEA: 0
NAUSEA: 0
SHORTNESS OF BREATH: 0
VOMITING: 0
EYE PAIN: 0
RHINORRHEA: 0
PHOTOPHOBIA: 1
EYE DISCHARGE: 0

## 2018-09-07 ASSESSMENT — PAIN SCALES - GENERAL: PAINLEVEL_OUTOF10: 10

## 2018-09-07 NOTE — ED PROVIDER NOTES
[facility]  [address] [place] [address]  Phone: [phone]  Fax: [phone]      Headache    Pt Name: Rosa Quinn  MRN: [id number]  Mauricegfyaneth 1980  Date of evaluation: 9/6/2018    Chief Complaint:     Chief Complaint   Patient presents with    Headache     C/O usual migraine headache and has taken imitrex at home with no relief. History of Present Illness:    Rosa Quinn is a 30 y.o. female who presents to the emergency department complaining of   Chief Complaint   Patient presents with    Headache     C/O usual migraine headache and has taken imitrex at home with no relief. Patient presents with headache that began yesterday afternoon. She reports max intensity developed later in the evening and overnight. States the intensity has waxed and waned throughout the day today. Has taken some Excedrin, ibuprofen and Imitrex. Last medication she took was Imitrex at 5 PM today. No recent ibuprofen use since yesterday. Denies any trauma. She reports the pain does radiate from the anterior region posteriorly which is common for her. No vision changes. Minimal photophobia which is typical for her. She states she does have a history of migraine headaches and this is fairly typical for her. Mostly frontal in nature. Denies any fevers. No recent illness. Not associated with syncope, visual disturbance, fever, numbness or focal weakness, altered mental status, jaw claudication, neck stiffness.   Thunderclap type headache - No  The quality of the pain is Aching and the severity of the pain is 8/10   Worst headache of entire life - No   Worst at onset - No  Similar history of the same:  Yes    Risk factors include:    HTN - No  Intracranial aneurysm or Prior SAH - No  Pregnancy - No  Trauma - No  Blood thinners - No   shunt - No  Malignancy - No  Immune suppression (Chemotherapy/HIV/Transplant) - No  Family history of intracranial aneurysm or SAH - No  Cocaine/Amphetamine use - No  Travel history (tick exposure) - No    Review of Systems:       Review of Systems   Constitutional: Negative for chills, fatigue and fever. HENT: Negative for rhinorrhea and sore throat. Eyes: Positive for photophobia. Negative for pain, discharge and visual disturbance. Respiratory: Negative for cough and shortness of breath. Cardiovascular: Negative for chest pain. Gastrointestinal: Negative for abdominal pain, diarrhea, nausea and vomiting. Genitourinary: Negative for difficulty urinating. Musculoskeletal: Negative for back pain and neck pain. Skin: Negative for rash. Neurological: Positive for headaches. Negative for weakness. All other systems reviewed and are negative. Past Medical History:    has a past medical history of Allergic rhinitis; Anesthesia; Aortic stenosis; Arthritis; Asthma; Dental crown present; GERD (gastroesophageal reflux disease); Heart murmur; Hyperlipidemia; Low blood sugar; Numbness and tingling; Petit mal ([address] Psoriasis; Pulmonary stenosis; Ringing in ears; Thyroid disease; and Wears glasses. Surgical History:    has a past surgical history that includes LEEP (2008); [place] tooth extraction (Right, 1/8/16); [place] tooth extraction; Endoscopy, colon, diagnostic (01/08/2018); Upper gastrointestinal endoscopy (01/08/2018); Colonoscopy (01/08/2018); pr egd transoral biopsy single/multiple (N/A, 1/8/2018); pr colonoscopy w/biopsy single/multiple (N/A, 1/8/2018); Cholecystectomy, laparoscopic (04/27/2018); and pr lap,cholecystectomy (N/A, 4/27/2018).     Current Medications:     Discharge Medication List as of 9/6/2018 11:54 PM      CONTINUE these medications which have NOT CHANGED    Details   ranitidine (ZANTAC) 150 MG tablet Take 150 mg by mouth 2 times dailyHistorical Med      RA VITAMIN D-3 2000 units CAPS take 1 capsule by mouth once daily, Disp-90 capsule, R-1Normal      VENTOLIN  (90 Base) MCG/ACT inhaler inhale alive. She indicated that her paternal aunt is alive. family history includes Cancer in her maternal aunt, maternal grandfather, maternal grandmother, paternal aunt, and paternal grandmother; Diabetes in her father; Heart Disease in her paternal grandfather; High Blood Pressure in her paternal grandfather; Other in her maternal aunt, maternal aunt, mother, and paternal aunt. Social History:    reports that she has never smoked. She has never used smokeless tobacco. She reports that she drinks alcohol. She reports that she does not use drugs. Physical Exam:   INITIAL VITALS:  height is 5' 2\" (1.575 m) and weight is 90.7 kg (200 lb). Her temperature is 98.1 °F (36.7 °C). Her blood pressure is 147/90 (abnormal) and her pulse is 88. Her respiration is 18 and oxygen saturation is 99%. Physical Exam   Constitutional: She is oriented to person, place, and time and well-developed, well-nourished, and in no distress. She appears not malnourished and not dehydrated. She appears healthy. Non-toxic appearance. She does not have a sickly appearance. No distress. HENT:   Head: Normocephalic and atraumatic. Right Ear: External ear normal.   Left Ear: External ear normal.   Nose: Nose normal.   Mouth/Throat: Oropharynx is clear and moist.   Eyes: Pupils are equal, round, and reactive to light. Conjunctivae, EOM and lids are normal. Right eye exhibits no discharge. Left eye exhibits no discharge. No scleral icterus. Neck: Normal range of motion and full passive range of motion without pain. Neck supple. No tracheal tenderness, no spinous process tenderness and no muscular tenderness present. No tracheal deviation and no edema present. Normal neck exam.  No meningeal signs. Cardiovascular: Normal rate, regular rhythm, normal heart sounds and intact distal pulses. No murmur heard. Pulmonary/Chest: Effort normal and breath sounds normal. No respiratory distress. She has no wheezes. Abdominal: Soft. feel she is appropriate for discharge and outpatient follow-up. She was advised to return right away if worse or for any new or concerning symptoms. She is comfortable with this plan. The patient presents with headache without signs of CNS bleed, stroke, infection, temporal arteritis, idiopathic intracranial hypertension, or other serious etiology. The patient is neurologically intact. Given the extremely low risk of these diagnoses further testing and evaluation for these possibilities does not appear to be indicated at this time. The patient has been instructed to return if the symptoms worsen or change in any way. I have reviewed the disposition diagnosis with the patient and or their family/guardian. I have answered their questions and given discharge instructions. They voiced understanding of these instructions and did not have any further questions or complaints. CONSULTS:  None    CRITICAL CARE:   None    PROCEDURES:  None    Final Impression:     1.  Migraine without status migrainosus, not intractable, unspecified migraine type          Disposition/Plan:   DISPOSITION Decision To Discharge 09/07/2018 12:46:40 AM      Condition on Disposition  Improved    PATIENT REFERRED TO:  Brooke Soares PA-C  [place] [id number]  [phone]    Schedule an appointment as soon as possible for a visit in 1 day        DISCHARGE MEDICATIONS:  Discharge Medication List as of 9/6/2018 11:54 PM          (Please note that portions of this note were completed with a voice recognition program.  Efforts were made to edit the dictations but occasionally words are mis-transcribed.)    Ronny Medellin DO  Emergency Physician Attending           Ronny Medellin DO  09/07/18 [id number]

## 2018-09-10 ENCOUNTER — OFFICE VISIT (OUTPATIENT)
Dept: FAMILY MEDICINE CLINIC | Age: 38
End: 2018-09-10
Payer: MEDICARE

## 2018-09-10 VITALS
WEIGHT: 198.1 LBS | DIASTOLIC BLOOD PRESSURE: 80 MMHG | BODY MASS INDEX: 36.46 KG/M2 | HEART RATE: 79 BPM | HEIGHT: 62 IN | OXYGEN SATURATION: 98 % | SYSTOLIC BLOOD PRESSURE: 126 MMHG | TEMPERATURE: 98.6 F

## 2018-09-10 DIAGNOSIS — E78.5 HYPERLIPIDEMIA, UNSPECIFIED HYPERLIPIDEMIA TYPE: Chronic | ICD-10-CM

## 2018-09-10 DIAGNOSIS — E55.9 VITAMIN D DEFICIENCY: ICD-10-CM

## 2018-09-10 DIAGNOSIS — E03.9 HYPOTHYROIDISM, UNSPECIFIED TYPE: ICD-10-CM

## 2018-09-10 DIAGNOSIS — R51.9 NONINTRACTABLE HEADACHE, UNSPECIFIED CHRONICITY PATTERN, UNSPECIFIED HEADACHE TYPE: Primary | ICD-10-CM

## 2018-09-10 DIAGNOSIS — Z23 NEED FOR INFLUENZA VACCINATION: ICD-10-CM

## 2018-09-10 PROCEDURE — G8427 DOCREV CUR MEDS BY ELIG CLIN: HCPCS | Performed by: PHYSICIAN ASSISTANT

## 2018-09-10 PROCEDURE — 90471 IMMUNIZATION ADMIN: CPT | Performed by: PHYSICIAN ASSISTANT

## 2018-09-10 PROCEDURE — 99214 OFFICE O/P EST MOD 30 MIN: CPT | Performed by: PHYSICIAN ASSISTANT

## 2018-09-10 PROCEDURE — G8417 CALC BMI ABV UP PARAM F/U: HCPCS | Performed by: PHYSICIAN ASSISTANT

## 2018-09-10 PROCEDURE — 90686 IIV4 VACC NO PRSV 0.5 ML IM: CPT | Performed by: PHYSICIAN ASSISTANT

## 2018-09-10 PROCEDURE — 1036F TOBACCO NON-USER: CPT | Performed by: PHYSICIAN ASSISTANT

## 2018-09-10 RX ORDER — TOPIRAMATE 25 MG/1
TABLET ORAL
COMMUNITY
End: 2018-09-10

## 2018-09-10 RX ORDER — ATORVASTATIN CALCIUM 20 MG/1
TABLET, FILM COATED ORAL
COMMUNITY
End: 2018-09-10 | Stop reason: ALTCHOICE

## 2018-09-10 RX ORDER — IBUPROFEN 800 MG/1
TABLET ORAL PRN
Status: ON HOLD | COMMUNITY
End: 2020-02-18 | Stop reason: SDUPTHER

## 2018-09-10 RX ORDER — DIPHENHYDRAMINE HCL 25 MG
TABLET ORAL
COMMUNITY
End: 2018-09-10 | Stop reason: ALTCHOICE

## 2018-09-10 RX ORDER — BUTALBITAL, ACETAMINOPHEN AND CAFFEINE 50; 325; 40 MG/1; MG/1; MG/1
TABLET ORAL
COMMUNITY
End: 2018-09-10

## 2018-09-10 RX ORDER — FLUTICASONE PROPIONATE 50 MCG
SPRAY, SUSPENSION (ML) NASAL
COMMUNITY
End: 2018-09-10 | Stop reason: ALTCHOICE

## 2018-09-10 RX ORDER — NICOTINE POLACRILEX 4 MG/1
GUM, CHEWING ORAL
COMMUNITY
End: 2018-09-10

## 2018-09-10 RX ORDER — ACETAMINOPHEN 160 MG
TABLET,DISINTEGRATING ORAL
COMMUNITY
End: 2018-12-12

## 2018-09-10 RX ORDER — DICYCLOMINE HCL 20 MG
TABLET ORAL
COMMUNITY
End: 2018-09-10 | Stop reason: ALTCHOICE

## 2018-09-10 RX ORDER — TOPIRAMATE 100 MG/1
TABLET, FILM COATED ORAL
COMMUNITY
End: 2018-09-10

## 2018-09-10 RX ORDER — ALBUTEROL SULFATE 90 UG/1
AEROSOL, METERED RESPIRATORY (INHALATION)
COMMUNITY
End: 2019-11-20

## 2018-09-10 RX ORDER — BENZONATATE 200 MG/1
CAPSULE ORAL
COMMUNITY
End: 2018-09-10

## 2018-09-10 ASSESSMENT — ENCOUNTER SYMPTOMS
COUGH: 0
CHEST TIGHTNESS: 0
FACIAL SWEATING: 0
HEARTBURN: 0
HOARSE VOICE: 0
SHORTNESS OF BREATH: 0
WHEEZING: 0
BACK PAIN: 0
RHINORRHEA: 0
SORE THROAT: 0
CHOKING: 0
GLOBUS SENSATION: 0
EYE DISCHARGE: 0
WATER BRASH: 0
EYE ITCHING: 0
SINUS PRESSURE: 0
ABDOMINAL PAIN: 0

## 2018-09-10 NOTE — PROGRESS NOTES
After obtaining consent, and per orders of Dr. Radu Kapoor injection of flu  given in left deltoid by Funmi Velasco. Patient instructed to remain in clinic for 20 minutes afterwards, and to report any adverse reaction to me immediately.  No reaction at this time

## 2018-09-10 NOTE — PROGRESS NOTES
ear pain, facial sweating, hearing loss, neck pain, numbness, rhinorrhea, sinus pressure, sore throat, weakness or weight loss. There is no history of obesity. Hyperlipidemia   This is a chronic problem. The current episode started more than 1 year ago. She has no history of chronic renal disease, diabetes, hypothyroidism, liver disease or obesity. Pertinent negatives include no chest pain, focal sensory loss, focal weakness, leg pain, myalgias or shortness of breath. The current treatment provides moderate improvement of lipids. Risk factors for coronary artery disease include obesity and dyslipidemia. Gastroesophageal Reflux   She reports no abdominal pain, no chest pain, no choking, no coughing, no dysphagia, no early satiety, no globus sensation, no heartburn, no hoarse voice, no sore throat, no water brash or no wheezing. This is a chronic problem. Associated symptoms include fatigue. Pertinent negatives include no anemia, melena, muscle weakness, orthopnea or weight loss. Past procedures do not include an abdominal ultrasound or an EGD. Past invasive treatments do not include gastroplasty.        Hemoglobin A1C (%)   Date Value   02/12/2018 5.0             ( goal A1C is < 7)   No results found for: LABMICR  LDL Cholesterol (mg/dL)   Date Value   08/31/2018 58   08/14/2017 76   08/18/2016 75       (goal LDL is <100)   AST (U/L)   Date Value   08/31/2018 15     ALT (U/L)   Date Value   08/31/2018 10     BUN (mg/dL)   Date Value   08/31/2018 9     BP Readings from Last 3 Encounters:   09/10/18 126/80   09/06/18 (!) 147/90   07/25/18 (!) 143/95          (goal 120/80)    Past Medical History:   Diagnosis Date    Allergic rhinitis     Anesthesia     HISTORY OF LONG EMERGENCE    Aortic stenosis     SEES DR Emmanuel Garza AS NEEDED    Arthritis     Asthma     Dental crown present     PORCELAIN VENEERS UPPER FRONT    GERD (gastroesophageal reflux disease)     Heart murmur     Hyperlipidemia 9/15/2015    Hypothyroidism 9/10/2018    Low blood sugar     Numbness and tingling     HANDS AND FEET/SEES DR. Leland ramirez ([facility] [address]     Psoriasis     Pulmonary stenosis     Ringing in ears     SEES AN ENT    Thyroid disease     Wears glasses       Past Surgical History:   Procedure Laterality Date    CHOLECYSTECTOMY, LAPAROSCOPIC  04/27/2018    COLONOSCOPY  01/08/2018    COLON, RANDOM BIOPSIES: MILD FOCAL ACTIVE COLITIS, HEMORRHOIDS    ENDOSCOPY, COLON, DIAGNOSTIC  01/08/2018    EGD and Colonoscopy    LEEP  2008    [place] COLONOSCOPY W/BIOPSY SINGLE/MULTIPLE N/A 1/8/2018    COLONOSCOPY WITH BIOPSY performed by Jossue Lee MD at [address] EGD TRANSORAL BIOPSY SINGLE/MULTIPLE N/A 1/8/2018    EGD BIOPSY performed by Jossue Lee MD at [facility] N/A 4/27/2018    [address] performed by Veronika Medrano MD at [address]  01/08/2018     grade 2 esophagitis    WISDOM TOOTH EXTRACTION Right 1/8/16    WISDOM TOOTH EXTRACTION         Family History   Problem Relation Age of Onset    Diabetes Father     Other Mother         blood disorder    Other Maternal Aunt         blood disorder    Cancer Maternal Aunt         breast    Other Paternal Aunt         chiari malformation type 2     Cancer Paternal Aunt         ovarian cancer    Cancer Maternal Grandmother     Cancer Maternal Grandfather     Cancer Paternal Grandmother     High Blood Pressure Paternal Grandfather     Heart Disease Paternal Grandfather     Other Maternal Aunt         brain aneurysm       Social History   Substance Use Topics    Smoking status: Never Smoker    Smokeless tobacco: Never Used    Alcohol use 0.0 oz/week      Comment: social       Current Outpatient Prescriptions   Medication Sig Dispense Refill    Cholecalciferol (VITAMIN D3) 2000 units CAPS Vitamin D3 2,000 unit capsule      health department. H/o LEEP. Cervix erythematous today. Erythematous tissue seen in OS. Pt to f/u with GYN. PAP wnl 5/14/15      Peripheral edema - Elevation. Compression stockings. Non pitting      Preop - Dental extraction. EKG here reviewed. Echo from 2015 reviewed. Labs and cxr ordered. D/w pt that she is ok for surgery. She is wanting to see cardiology b/c she wants to get established. Pt states h/o valve disease. Echo shows no significant valve disease.      Cephalgia - H/o HA since teens. L orbital/frontal. N/V. HA daily. Start elavil. Photosensitivity/aura. Neurointact. Pt denies pregnancy. Start elavil. Doing better. Still having HA. Increase dose. MRI neg. Was on Relief with fioricet. Seen in ED 8/2017. Still with HA. Start steroid burst.  Ketoralac today. Also on topamax 100mg BID through neuro. Seeing 2 neurologists through [facility]. Now off of topomax. On elavil. Taking imitrex.        Fatigue - Labs ordered. Pt states worse with heat. No depression, wt loss, night sweats or chills. Pt denies preg. Inflammatory markers elevated. Referral to rheum.      Vitamin D def - Replace and recheck 12 wk.      Rectal bleeding - Recurrent. Small quant. Pt is having abd pain. Neg CT recently. Referral to GI. Colonoscopy and endoscopy - 2018. Balanced diet and routine exercise encouraged.      Multivitamin with vitamin D daily encouraged.      Good sleep hygiene encouraged.      Dental exam q 6 mo.      Vision yearly.       Sunscreen encouraged.      Immunizations reviewed. Flu - 8/21/17    Prevnar 13 - 8/17/16    Pneumovax 23 - 8/21/17    Tdap - Pt states cannot take    Vision exam - Spring 2017     Patient given educational materials - see patient instructions. Discussed use, benefit, and side effects of prescribed medications. All patient questions answered. Pt voiced understanding. Reviewed health maintenance. Instructed to continue current medications, diet and exercise.   Patient agreed with treatment plan. Follow up as directed.      Electronically signed by Mary Husain PA-C on 9/10/2018 at 10:25 AM

## 2018-09-10 NOTE — TELEPHONE ENCOUNTER
Last visit:9/10/18  Last Med refill:    Next Visit Date:  Future Appointments  Date Time Provider Skylar Shoemakeri   10/18/2018 10:40 AM Merissa Franco MD Neuro Spec [id number]   3/11/2019 9:30 AM Diana Chandler PA-C [address] [facility]   Topic Date Due    Cervical cancer screen  09/04/2020 (Originally 5/14/2018)    TSH testing  08/31/2019    Flu vaccine  Completed    Pneumococcal med risk  Completed    HIV screen  Completed       Hemoglobin A1C (%)   Date Value   02/12/2018 5.0             ( goal A1C is < 7)   No results found for: LABMICR  LDL Cholesterol (mg/dL)   Date Value   08/31/2018 58   08/14/2017 76       (goal LDL is <100)   AST (U/L)   Date Value   08/31/2018 15     ALT (U/L)   Date Value   08/31/2018 10     BUN (mg/dL)   Date Value   08/31/2018 9     BP Readings from Last 3 Encounters:   09/10/18 126/80   09/06/18 (!) 147/90   07/25/18 (!) 143/95          (goal 120/80)    All Future Testing planned in [facility]              Patient Active Problem List:     Asthma     Family planning     Murmur     Seasonal allergies     Elevated WBC count     Abnormal TSH     Hyperlipidemia     Vitamin D deficiency     Headache     Need for influenza vaccination     Need for pneumococcal vaccination     Enlarged tonsils     Generalized abdominal pain     Abnormal ultrasound of gallbladder     Nausea & vomiting     Rectal bleeding     Colitis     BMI 35.0-35.9,adult     S/P laparoscopic cholecystectomy     Symptomatic cholelithiasis     Post-operative nausea and vomiting     Hypothyroidism

## 2018-09-19 ENCOUNTER — HOSPITAL ENCOUNTER (EMERGENCY)
Age: 38
Discharge: HOME | End: 2018-09-19
Attending: EMERGENCY MEDICINE
Payer: MEDICARE

## 2018-09-19 VITALS
DIASTOLIC BLOOD PRESSURE: 71 MMHG | OXYGEN SATURATION: 98 % | BODY MASS INDEX: 35.7 KG/M2 | TEMPERATURE: 98.5 F | RESPIRATION RATE: 12 BRPM | HEART RATE: 70 BPM | HEIGHT: 62 IN | SYSTOLIC BLOOD PRESSURE: 120 MMHG | WEIGHT: 194 LBS

## 2018-09-19 DIAGNOSIS — R51.9 ACUTE NONINTRACTABLE HEADACHE, UNSPECIFIED HEADACHE TYPE: Primary | ICD-10-CM

## 2018-09-19 PROCEDURE — 99283 EMERGENCY DEPT VISIT LOW MDM: CPT

## 2018-09-19 PROCEDURE — 96374 THER/PROPH/DIAG INJ IV PUSH: CPT

## 2018-09-19 PROCEDURE — 96375 TX/PRO/DX INJ NEW DRUG ADDON: CPT

## 2018-09-19 PROCEDURE — 6360000002 HC RX W HCPCS: Performed by: EMERGENCY MEDICINE

## 2018-09-19 PROCEDURE — 2580000003 HC RX 258: Performed by: EMERGENCY MEDICINE

## 2018-09-19 RX ORDER — DIPHENHYDRAMINE HYDROCHLORIDE 50 MG/ML
50 INJECTION INTRAMUSCULAR; INTRAVENOUS ONCE
Status: DISCONTINUED | OUTPATIENT
Start: 2018-09-19 | End: 2018-09-19 | Stop reason: HOSPADM

## 2018-09-19 RX ORDER — PROMETHAZINE HYDROCHLORIDE 25 MG/ML
25 INJECTION, SOLUTION INTRAMUSCULAR; INTRAVENOUS ONCE
Status: COMPLETED | OUTPATIENT
Start: 2018-09-19 | End: 2018-09-19

## 2018-09-19 RX ORDER — 0.9 % SODIUM CHLORIDE 0.9 %
1000 INTRAVENOUS SOLUTION INTRAVENOUS ONCE
Status: COMPLETED | OUTPATIENT
Start: 2018-09-19 | End: 2018-09-19

## 2018-09-19 RX ORDER — KETOROLAC TROMETHAMINE 30 MG/ML
30 INJECTION, SOLUTION INTRAMUSCULAR; INTRAVENOUS ONCE
Status: COMPLETED | OUTPATIENT
Start: 2018-09-19 | End: 2018-09-19

## 2018-09-19 RX ADMIN — PROMETHAZINE HYDROCHLORIDE 25 MG: 25 INJECTION INTRAMUSCULAR; INTRAVENOUS at 10:31

## 2018-09-19 RX ADMIN — KETOROLAC TROMETHAMINE 30 MG: 30 INJECTION, SOLUTION INTRAMUSCULAR at 10:30

## 2018-09-19 RX ADMIN — SODIUM CHLORIDE 1000 ML: 9 INJECTION, SOLUTION INTRAVENOUS at 10:30

## 2018-09-19 ASSESSMENT — PAIN SCALES - GENERAL
PAINLEVEL_OUTOF10: 9
PAINLEVEL_OUTOF10: 6
PAINLEVEL_OUTOF10: 9

## 2018-09-19 ASSESSMENT — PAIN DESCRIPTION - PAIN TYPE
TYPE: ACUTE PAIN
TYPE: ACUTE PAIN

## 2018-09-19 ASSESSMENT — PAIN DESCRIPTION - DESCRIPTORS: DESCRIPTORS: ACHING;PRESSURE

## 2018-09-19 ASSESSMENT — PAIN DESCRIPTION - LOCATION
LOCATION: HEAD
LOCATION: HEAD

## 2018-09-19 NOTE — ED PROVIDER NOTES
paternal grandmother is . She indicated that her paternal grandfather is . She indicated that only one of her two maternal aunts is alive. She indicated that her paternal aunt is alive. family history includes Cancer in her maternal aunt, maternal grandfather, maternal grandmother, paternal aunt, and paternal grandmother; Diabetes in her father; Heart Disease in her paternal grandfather; High Blood Pressure in her paternal grandfather; Other in her maternal aunt, maternal aunt, mother, and paternal aunt. SOCIAL HISTORY      reports that she has never smoked. She has never used smokeless tobacco. She reports that she drinks alcohol. She reports that she uses drugs, including Marijuana. PHYSICAL EXAM     INITIAL VITALS:  height is 5' 2\" (1.575 m) and weight is 88 kg (194 lb). Her oral temperature is 98.5 °F (36.9 °C). Her blood pressure is 139/76 and her pulse is 87. Her respiration is 16 and oxygen saturation is 98%. Physical Exam   Constitutional: She is oriented to person, place, and time. She appears well-developed and well-nourished. No distress. HENT:   Head: Normocephalic and atraumatic. Mouth/Throat: Oropharynx is clear and moist.   Eyes: Pupils are equal, round, and reactive to light. Conjunctivae and EOM are normal.   Neck: Normal range of motion. Neck supple. No tracheal deviation present. Cardiovascular: Normal rate, regular rhythm and intact distal pulses. Pulmonary/Chest: Effort normal and breath sounds normal. No respiratory distress. Abdominal: Soft. Bowel sounds are normal. She exhibits no distension. There is no tenderness. Musculoskeletal: Normal range of motion. She exhibits no edema or tenderness. Neurological: She is alert and oriented to person, place, and time. No cranial nerve deficit. She exhibits normal muscle tone. Coordination normal.   Skin: Skin is warm and dry. Psychiatric: She has a normal mood and affect.  Her behavior is normal. Judgment

## 2018-10-05 ENCOUNTER — TELEPHONE (OUTPATIENT)
Dept: FAMILY MEDICINE CLINIC | Age: 38
End: 2018-10-05

## 2018-10-23 ENCOUNTER — OFFICE VISIT (OUTPATIENT)
Dept: NEUROLOGY | Age: 38
End: 2018-10-23
Payer: MEDICARE

## 2018-10-23 VITALS
BODY MASS INDEX: 35.7 KG/M2 | DIASTOLIC BLOOD PRESSURE: 76 MMHG | SYSTOLIC BLOOD PRESSURE: 124 MMHG | HEIGHT: 62 IN | WEIGHT: 194 LBS | HEART RATE: 86 BPM

## 2018-10-23 DIAGNOSIS — G43.701 CHRONIC MIGRAINE W/O AURA, NOT INTRACTABLE, W STAT MIGR: Primary | ICD-10-CM

## 2018-10-23 PROCEDURE — G8482 FLU IMMUNIZE ORDER/ADMIN: HCPCS | Performed by: PSYCHIATRY & NEUROLOGY

## 2018-10-23 PROCEDURE — 99214 OFFICE O/P EST MOD 30 MIN: CPT | Performed by: PSYCHIATRY & NEUROLOGY

## 2018-10-23 PROCEDURE — G8427 DOCREV CUR MEDS BY ELIG CLIN: HCPCS | Performed by: PSYCHIATRY & NEUROLOGY

## 2018-10-23 PROCEDURE — 1036F TOBACCO NON-USER: CPT | Performed by: PSYCHIATRY & NEUROLOGY

## 2018-10-23 PROCEDURE — G8417 CALC BMI ABV UP PARAM F/U: HCPCS | Performed by: PSYCHIATRY & NEUROLOGY

## 2018-10-23 RX ORDER — ONDANSETRON 4 MG/1
4 TABLET, FILM COATED ORAL PRN
COMMUNITY
End: 2019-04-25

## 2018-10-23 NOTE — PROGRESS NOTES
(paresthesias)    Prior testing reviewed:  EMG 12/4/17: No evidence of neuropathy, plexopathy, radiculopathy or myopathy On the right upper and lower extremities  MRI brain without contrast 3/14/2016: No acute intracranial abnormality      PAST MEDICAL HISTORY:         Diagnosis Date    Allergic rhinitis     Anesthesia     HISTORY OF LONG EMERGENCE    Aortic stenosis     SEES DR Gideon Austin AS NEEDED    Arthritis     Asthma     Dental crown present     PORCELAIN VENEERS UPPER FRONT    GERD (gastroesophageal reflux disease)     Heart murmur     Hyperlipidemia 9/15/2015    Hypothyroidism 9/10/2018    Low blood sugar     Numbness and tingling     HANDS AND FEET/[facility]ES [facility]     Psoriasis     Pulmonary stenosis     Ringing in ears     SEES AN ENT    Thyroid disease     Wears glasses         PAST SURGICAL HISTORY:         Procedure Laterality Date    CHOLECYSTECTOMY, LAPAROSCOPIC  04/27/2018    COLONOSCOPY  01/08/2018    COLON, RANDOM BIOPSIES: MILD FOCAL ACTIVE COLITIS, HEMORRHOIDS    ENDOSCOPY, COLON, DIAGNOSTIC  01/08/2018    EGD and Colonoscopy    LEEP  2008    [place] COLONOSCOPY W/BIOPSY SINGLE/MULTIPLE N/A 1/8/2018    COLONOSCOPY WITH BIOPSY performed by Earlene Jason MD at [address] EGD TRANSORAL BIOPSY SINGLE/MULTIPLE N/A 1/8/2018    EGD BIOPSY performed by Earlene Jason MD at [facility] [address] N/A 4/27/2018    [address] performed by Maida Serna MD at [address] [place] [address]  01/08/2018     grade 2 esophagitis    WISDOM TOOTH EXTRACTION Right 1/8/16    WISDOM TOOTH EXTRACTION          SOCIAL HISTORY:     Social History     Social History    Marital status: Legally      Spouse name: N/A    Number of children: N/A    Years of education: N/A     Occupational History    Not on file.      Social History Main Topics    Smoking status: Never Smoker    Smokeless tobacco: Never Used    Alcohol use 0.0 oz/week      Comment: social     Drug use: Yes     Types: Marijuana      Comment: edible cannabis for pain    Sexual activity: Yes     Partners: Male     Birth control/ protection: Pill     Other Topics Concern    Not on file     Social History Narrative    No narrative on file       CURRENT MEDICATIONS:     Current Outpatient Prescriptions   Medication Sig Dispense Refill    Ondansetron HCl (ZOFRAN PO) Take by mouth      Cholecalciferol (VITAMIN D3) 2000 units CAPS Vitamin D3 2,000 unit capsule      ibuprofen (ADVIL;MOTRIN) 800 MG tablet ibuprofen 800 mg tablet   Take 1 tablet as needed by oral route for 20 days.  albuterol sulfate HFA (VENTOLIN HFA) 108 (90 Base) MCG/ACT inhaler Ventolin HFA 90 mcg/actuation aerosol inhaler      norgestrel-ethinyl estradiol (CRYSELLE-28) 0.3-30 MG-MCG per tablet Take 1 tablet by mouth daily 28 tablet 1    atorvastatin (LIPITOR) 20 MG tablet take 1 tablet by mouth once daily 90 tablet 1    levothyroxine (SYNTHROID) 25 MCG tablet Take 1 tablet by mouth Daily 90 tablet 1    SUMAtriptan (IMITREX) 50 MG tablet Take 50 mg by mouth once as needed for Migraine      fluticasone (FLONASE) 50 MCG/ACT nasal spray 2 sprays by Nasal route      omeprazole (PRILOSEC) 20 MG delayed release capsule Take 1 capsule by mouth Daily 30 capsule 3    diphenhydrAMINE (BENADRYL) 25 MG tablet Take 25 mg by mouth every 6 hours as needed for Itching       No current facility-administered medications for this visit.          ALLERGIES:     Allergies   Allergen Reactions    Food Hives     Oysters, clams, mussels, scallops    Bactrim [Sulfamethoxazole-Trimethoprim] Hives    Benzocaine     Decadron [Dexamethasone]      Hives and hot flashes      Seasonal     Sulfamethoxazole-Trimethoprim Hives    Tetanus Toxoid, Adsorbed     Tetanus Toxoids                              REVIEW OF SYSTEMS    CONSTITUTIONAL Weight: absent, switch her sumatriptan from 50 mg to 6 mg subcu so she can get better and faster relief. She would like to stay off any prophylactic treatment for now. Advised patient to call me if her headaches get more frequent and we will consider restarting her on amitriptyline, which worked well for her before with no side effects. Follow-up in 6 months. Maonlo Novoa MD  Neurology and Sleep Medicine  [facility].    Please note that this chart was generated using voice recognition Dragon dictation software. Although every effort was made to ensure the accuracy of this automated transcription, some errors in transcription may have occurred.

## 2018-10-24 RX ORDER — SUMATRIPTAN 6 MG/.5ML
INJECTION, SOLUTION SUBCUTANEOUS
Qty: 1 ML | Refills: 3 | Status: SHIPPED | OUTPATIENT
Start: 2018-10-24 | End: 2020-06-04 | Stop reason: SDUPTHER

## 2018-11-09 RX ORDER — ATORVASTATIN CALCIUM 20 MG/1
TABLET, FILM COATED ORAL
Qty: 90 TABLET | Refills: 1 | Status: SHIPPED | OUTPATIENT
Start: 2018-11-09 | End: 2019-07-12 | Stop reason: SDUPTHER

## 2018-11-09 RX ORDER — NORGESTREL-ETHINYL ESTRADIOL 0.3-0.03MG
1 TABLET ORAL DAILY
Qty: 28 TABLET | Refills: 1 | Status: SHIPPED | OUTPATIENT
Start: 2018-11-09 | End: 2019-01-07 | Stop reason: SDUPTHER

## 2018-11-09 NOTE — TELEPHONE ENCOUNTER
Please Approve or Refuse.   Send to Pharmacy per Pt's Request: [facility] in [place]     Next Visit Date:  11/13/2018   Last Visit Date: 9/10/2018    Hemoglobin A1C (%)   Date Value   02/12/2018 5.0             ( goal A1C is < 7)   BP Readings from Last 3 Encounters:   10/23/18 124/76   09/19/18 120/71   09/10/18 126/80          (goal 120/80)  BUN   Date Value Ref Range Status   08/31/2018 9 6 - 20 mg/dL Final     CREATININE   Date Value Ref Range Status   08/31/2018 0.51 0.50 - 0.90 mg/dL Final     Potassium   Date Value Ref Range Status   08/31/2018 4.0 3.7 - 5.3 mmol/L Final

## 2018-11-13 ENCOUNTER — OFFICE VISIT (OUTPATIENT)
Dept: FAMILY MEDICINE CLINIC | Age: 38
End: 2018-11-13
Payer: MEDICARE

## 2018-11-13 ENCOUNTER — HOSPITAL ENCOUNTER (OUTPATIENT)
Age: 38
Setting detail: SPECIMEN
Discharge: HOME | End: 2018-11-13
Payer: MEDICARE

## 2018-11-13 VITALS
HEIGHT: 62 IN | SYSTOLIC BLOOD PRESSURE: 123 MMHG | TEMPERATURE: 98.1 F | BODY MASS INDEX: 34.96 KG/M2 | OXYGEN SATURATION: 98 % | WEIGHT: 190 LBS | DIASTOLIC BLOOD PRESSURE: 74 MMHG | HEART RATE: 97 BPM

## 2018-11-13 DIAGNOSIS — Z12.4 ROUTINE PAPANICOLAOU SMEAR: Primary | ICD-10-CM

## 2018-11-13 PROCEDURE — 1036F TOBACCO NON-USER: CPT | Performed by: PHYSICIAN ASSISTANT

## 2018-11-13 PROCEDURE — 99213 OFFICE O/P EST LOW 20 MIN: CPT | Performed by: PHYSICIAN ASSISTANT

## 2018-11-13 PROCEDURE — G8417 CALC BMI ABV UP PARAM F/U: HCPCS | Performed by: PHYSICIAN ASSISTANT

## 2018-11-13 PROCEDURE — G8427 DOCREV CUR MEDS BY ELIG CLIN: HCPCS | Performed by: PHYSICIAN ASSISTANT

## 2018-11-13 PROCEDURE — G8482 FLU IMMUNIZE ORDER/ADMIN: HCPCS | Performed by: PHYSICIAN ASSISTANT

## 2018-11-13 ASSESSMENT — PATIENT HEALTH QUESTIONNAIRE - PHQ9
SUM OF ALL RESPONSES TO PHQ9 QUESTIONS 1 & 2: 0
2. FEELING DOWN, DEPRESSED OR HOPELESS: 0
1. LITTLE INTEREST OR PLEASURE IN DOING THINGS: 0
SUM OF ALL RESPONSES TO PHQ QUESTIONS 1-9: 0
SUM OF ALL RESPONSES TO PHQ QUESTIONS 1-9: 0

## 2018-11-26 ASSESSMENT — ENCOUNTER SYMPTOMS
EYE DISCHARGE: 0
ABDOMINAL PAIN: 0
VOMITING: 0
EYE ITCHING: 0
CHEST TIGHTNESS: 0
NAUSEA: 0
SHORTNESS OF BREATH: 0
DIARRHEA: 0
COUGH: 0
SINUS PRESSURE: 0
RHINORRHEA: 0
SORE THROAT: 0

## 2018-11-26 NOTE — PROGRESS NOTES
negative. Objective:     Physical Exam   Constitutional: She is oriented to person, place, and time. She appears well-developed and well-nourished. No distress. /74   Pulse 97   Temp 98.1 °F (36.7 °C) (Oral)   Ht 5' 2\" (1.575 m)   Wt 190 lb (86.2 kg)   LMP 10/19/2018   SpO2 98%   BMI 34.75 kg/m²      HENT:   Head: Normocephalic and atraumatic. Right Ear: External ear normal.   Left Ear: External ear normal.   Nose: Nose normal.   Mouth/Throat: Oropharynx is clear and moist.   Eyes: Pupils are equal, round, and reactive to light. Conjunctivae and EOM are normal. Right eye exhibits no discharge. Left eye exhibits no discharge. No scleral icterus. Neck: Normal range of motion. Neck supple. No tracheal deviation present. No thyromegaly present. Cardiovascular: Normal rate, regular rhythm and normal heart sounds. Exam reveals no gallop and no friction rub. No murmur heard. Pulmonary/Chest: Effort normal and breath sounds normal. No stridor. No respiratory distress. She has no wheezes. She has no rales. She exhibits no tenderness. Abdominal: Soft. Bowel sounds are normal. She exhibits no distension. There is no tenderness. There is no rebound and no guarding. Genitourinary: Vagina normal and uterus normal. No vaginal discharge found. Musculoskeletal: She exhibits no edema. Neurological: She is alert and oriented to person, place, and time. Gait normal.   Skin: Skin is warm and dry. No rash noted. She is not diaphoretic. Psychiatric: She has a normal mood and affect. Her affect is not inappropriate. Nursing note and vitals reviewed. /74   Pulse 97   Temp 98.1 °F (36.7 °C) (Oral)   Ht 5' 2\" (1.575 m)   Wt 190 lb (86.2 kg)   LMP 10/19/2018   SpO2 98%   BMI 34.75 kg/m²     Assessment:       Diagnosis Orders   1. Routine Papanicolaou smear  GYN Cytology             Plan:      No Follow-up on file.     Orders Placed This Encounter   Procedures    GYN Cytology

## 2018-11-30 LAB — CYTOLOGY REPORT: NORMAL

## 2018-12-12 RX ORDER — GLUCOSAMINE/CHONDR SU A SOD 750-600 MG
TABLET ORAL
Qty: 90 CAPSULE | Refills: 1 | Status: SHIPPED | OUTPATIENT
Start: 2018-12-12 | End: 2019-08-19 | Stop reason: SDUPTHER

## 2018-12-12 RX ORDER — OMEPRAZOLE 20 MG/1
CAPSULE, DELAYED RELEASE ORAL
Qty: 30 CAPSULE | Refills: 3 | Status: SHIPPED | OUTPATIENT
Start: 2018-12-12 | End: 2019-09-16 | Stop reason: SDUPTHER

## 2018-12-12 NOTE — TELEPHONE ENCOUNTER
Please Approve or Refuse.   Send to Pharmacy per Pt's Request:      Next Visit Date:  3/11/2019   Last Visit Date: 11/13/2018    Hemoglobin A1C (%)   Date Value   02/12/2018 5.0             ( goal A1C is < 7)   BP Readings from Last 3 Encounters:   11/13/18 123/74   10/23/18 124/76   09/19/18 120/71          (goal 120/80)  BUN   Date Value Ref Range Status   08/31/2018 9 6 - 20 mg/dL Final     CREATININE   Date Value Ref Range Status   08/31/2018 0.51 0.50 - 0.90 mg/dL Final     Potassium   Date Value Ref Range Status   08/31/2018 4.0 3.7 - 5.3 mmol/L Final

## 2018-12-20 DIAGNOSIS — D72.829 LEUKOCYTOSIS, UNSPECIFIED TYPE: Primary | ICD-10-CM

## 2019-01-31 ENCOUNTER — OFFICE VISIT (OUTPATIENT)
Dept: FAMILY MEDICINE CLINIC | Age: 39
End: 2019-01-31
Payer: COMMERCIAL

## 2019-01-31 VITALS
TEMPERATURE: 98.8 F | HEART RATE: 108 BPM | BODY MASS INDEX: 34.41 KG/M2 | DIASTOLIC BLOOD PRESSURE: 70 MMHG | SYSTOLIC BLOOD PRESSURE: 120 MMHG | OXYGEN SATURATION: 98 % | WEIGHT: 187 LBS | HEIGHT: 62 IN

## 2019-01-31 DIAGNOSIS — J06.9 UPPER RESPIRATORY TRACT INFECTION, UNSPECIFIED TYPE: Primary | ICD-10-CM

## 2019-01-31 PROCEDURE — G8482 FLU IMMUNIZE ORDER/ADMIN: HCPCS | Performed by: PHYSICIAN ASSISTANT

## 2019-01-31 PROCEDURE — 1036F TOBACCO NON-USER: CPT | Performed by: PHYSICIAN ASSISTANT

## 2019-01-31 PROCEDURE — G8417 CALC BMI ABV UP PARAM F/U: HCPCS | Performed by: PHYSICIAN ASSISTANT

## 2019-01-31 PROCEDURE — G8427 DOCREV CUR MEDS BY ELIG CLIN: HCPCS | Performed by: PHYSICIAN ASSISTANT

## 2019-01-31 PROCEDURE — 99213 OFFICE O/P EST LOW 20 MIN: CPT | Performed by: PHYSICIAN ASSISTANT

## 2019-01-31 RX ORDER — BROMPHENIRAMINE MALEATE, PSEUDOEPHEDRINE HYDROCHLORIDE, AND DEXTROMETHORPHAN HYDROBROMIDE 2; 30; 10 MG/5ML; MG/5ML; MG/5ML
SYRUP ORAL
Qty: 180 ML | Refills: 0 | Status: SHIPPED | OUTPATIENT
Start: 2019-01-31 | End: 2019-04-23 | Stop reason: ALTCHOICE

## 2019-01-31 RX ORDER — AZITHROMYCIN 250 MG/1
TABLET, FILM COATED ORAL
Qty: 6 TABLET | Refills: 0 | Status: SHIPPED | OUTPATIENT
Start: 2019-01-31 | End: 2019-04-23 | Stop reason: ALTCHOICE

## 2019-01-31 ASSESSMENT — ENCOUNTER SYMPTOMS
SINUS PAIN: 0
NAUSEA: 0
EYES NEGATIVE: 1
CHEST TIGHTNESS: 0
VOMITING: 0
SORE THROAT: 1
COUGH: 1
WHEEZING: 0
SINUS PRESSURE: 0
RHINORRHEA: 0
DIARRHEA: 0
ABDOMINAL PAIN: 0
SHORTNESS OF BREATH: 0
SWOLLEN GLANDS: 0

## 2019-02-01 DIAGNOSIS — D72.829 LEUKOCYTOSIS, UNSPECIFIED TYPE: Primary | ICD-10-CM

## 2019-02-07 ENCOUNTER — HOSPITAL ENCOUNTER (OUTPATIENT)
Age: 39
Discharge: HOME | End: 2019-02-07
Payer: COMMERCIAL

## 2019-02-07 DIAGNOSIS — D72.829 LEUKOCYTOSIS, UNSPECIFIED TYPE: ICD-10-CM

## 2019-02-07 LAB
ABSOLUTE EOS #: 0.06 K/UL (ref 0–0.44)
ABSOLUTE IMMATURE GRANULOCYTE: 0.05 K/UL (ref 0–0.3)
ABSOLUTE LYMPH #: 3.57 K/UL (ref 1.1–3.7)
ABSOLUTE MONO #: 0.91 K/UL (ref 0.1–1.2)
BASOPHILS # BLD: 0 % (ref 0–2)
BASOPHILS ABSOLUTE: 0.05 K/UL (ref 0–0.2)
DIFFERENTIAL TYPE: ABNORMAL
EOSINOPHILS RELATIVE PERCENT: 1 % (ref 1–4)
HCT VFR BLD CALC: 43.5 % (ref 36.3–47.1)
HEMOGLOBIN: 14 G/DL (ref 11.9–15.1)
IMMATURE GRANULOCYTES: 0 %
LYMPHOCYTES # BLD: 27 % (ref 24–43)
MCH RBC QN AUTO: 28.7 PG (ref 25.2–33.5)
MCHC RBC AUTO-ENTMCNC: 32.2 G/DL (ref 28.4–34.8)
MCV RBC AUTO: 89.3 FL (ref 82.6–102.9)
MONOCYTES # BLD: 7 % (ref 3–12)
NRBC AUTOMATED: 0 PER 100 WBC
PDW BLD-RTO: 13.2 % (ref 11.8–14.4)
PLATELET # BLD: 421 K/UL (ref 138–453)
PLATELET ESTIMATE: ABNORMAL
PMV BLD AUTO: 9.5 FL (ref 8.1–13.5)
RBC # BLD: 4.87 M/UL (ref 3.95–5.11)
RBC # BLD: ABNORMAL 10*6/UL
SEG NEUTROPHILS: 65 % (ref 36–65)
SEGMENTED NEUTROPHILS ABSOLUTE COUNT: 8.55 K/UL (ref 1.5–8.1)
WBC # BLD: 13.2 K/UL (ref 3.5–11.3)
WBC # BLD: ABNORMAL 10*3/UL

## 2019-02-07 PROCEDURE — 85025 COMPLETE CBC W/AUTO DIFF WBC: CPT

## 2019-02-07 PROCEDURE — 36415 COLL VENOUS BLD VENIPUNCTURE: CPT

## 2019-03-20 ENCOUNTER — TELEPHONE (OUTPATIENT)
Dept: FAMILY MEDICINE CLINIC | Age: 39
End: 2019-03-20

## 2019-04-23 ENCOUNTER — OFFICE VISIT (OUTPATIENT)
Dept: NEUROLOGY | Age: 39
End: 2019-04-23
Payer: COMMERCIAL

## 2019-04-23 VITALS
HEART RATE: 81 BPM | WEIGHT: 182 LBS | SYSTOLIC BLOOD PRESSURE: 122 MMHG | BODY MASS INDEX: 33.49 KG/M2 | DIASTOLIC BLOOD PRESSURE: 86 MMHG | HEIGHT: 62 IN

## 2019-04-23 DIAGNOSIS — G43.711 INTRACTABLE CHRONIC MIGRAINE WITHOUT AURA WITH STATUS MIGRAINOSUS: Primary | ICD-10-CM

## 2019-04-23 PROCEDURE — G8417 CALC BMI ABV UP PARAM F/U: HCPCS | Performed by: PSYCHIATRY & NEUROLOGY

## 2019-04-23 PROCEDURE — G8427 DOCREV CUR MEDS BY ELIG CLIN: HCPCS | Performed by: PSYCHIATRY & NEUROLOGY

## 2019-04-23 PROCEDURE — 99214 OFFICE O/P EST MOD 30 MIN: CPT | Performed by: PSYCHIATRY & NEUROLOGY

## 2019-04-23 PROCEDURE — 1036F TOBACCO NON-USER: CPT | Performed by: PSYCHIATRY & NEUROLOGY

## 2019-04-23 NOTE — PROGRESS NOTES
[facility]  Offices: [address], [place], [address] [facility]  [address] [facility], [address], [place], 183 [facility]  [address] [facility] [address], [address]  Phone: [phone]  Fax: [phone]    LIO Jennet Rei, MD Annell Meissner, MD Ahmed B. Claudette Rosa, MD Annitta Mulder, MD Felicie Page, MD Parag Muñoz, CNP    4/23/2019      HISTORY OF PRESENT ILLNESS:       I had the pleasure of seeing Monisha Delgado, who returns for continuing neurologic care for Chronic migraine without aura. She is currently on no prophylactic medications, taking Imitrex SQ for rescue. Today, patient reports her migraine headaches have increased in frequency. She reports having a daily headache (30/30 days/mo) and at least to a dose will be a severe migraine. Thankfully, she does get significant relief with Imitrex, but it makes her lightheaded and dizzy for about 10 minutes afterwards. Hence, she cannot take it when she is working or driving. Her headaches are typically located in the bifrontal and bitemporal areas, described as a pounding and throbbing pain with a severity of 5-8/10. Cold weather usually triggers her headaches and they are associated with nausea as well as photophobia and phonophobia. Headaches are typically aggravated by bright light in physical activity, and partially relieved by rest and sleep. She denies any changes in her sleeping habits, medications or routine.     Prior medication trials: Topamax 125 mg twice a day ( severe paresthesias), amitriptyline 100 mg (worked initially)     Prior testing reviewed:  EMG 12/4/17: No evidence of neuropathy, plexopathy, radiculopathy or myopathy On the right upper and lower extremities  MRI brain without contrast 3/14/2016: No acute intracranial abnormality            PAST MEDICAL HISTORY:         Diagnosis Date    Allergic rhinitis     Anesthesia     HISTORY OF LONG EMERGENCE    Aortic stenosis     SEES DR Jonny Nance AS NEEDED    Arthritis     Asthma     Dental crown present     PORCELAIN VENEERS UPPER FRONT    GERD (gastroesophageal reflux disease)     Heart murmur     Hyperlipidemia 9/15/2015    Hypothyroidism 9/10/2018    Low blood sugar     Numbness and tingling     HANDS AND FEET/SEES [facility] ram[facility]     Psoriasis     Pulmonary stenosis     Ringing in ears     SEES AN ENT    Thyroid disease     Wears glasses         PAST SURGICAL HISTORY:         Procedure Laterality Date    CHOLECYSTECTOMY, LAPAROSCOPIC  04/27/2018    COLONOSCOPY  01/08/2018    COLON, RANDOM BIOPSIES: MILD FOCAL ACTIVE COLITIS, HEMORRHOIDS    ENDOSCOPY, COLON, DIAGNOSTIC  01/08/2018    EGD and Colonoscopy    LEEP  2008    SC COLONOSCOPY W/BIOPSY SINGLE/MULTIPLE N/A 1/8/2018    COLONOSCOPY WITH BIOPSY performed by Hugo De La Cruz MD at [address] EGD TRANSORAL BIOPSY SINGLE/MULTIPLE N/A 1/8/2018    EGD BIOPSY performed by Hugo De La Cruz MD at [facility] N/A 4/27/2018    [address] performed by Laurel Dandy, MD at [address]  01/08/2018     grade 2 esophagitis    WISDOM TOOTH EXTRACTION Right 1/8/16    WISDOM TOOTH EXTRACTION          SOCIAL HISTORY:     Social History     Socioeconomic History    Marital status: Legally      Spouse name: Not on file    Number of children: Not on file    Years of education: Not on file    Highest education level: Not on file   Occupational History    Not on file   Social Needs    Financial resource strain: Not on file    Food insecurity:     Worry: Not on file     Inability: Not on file    Transportation needs:     Medical: Not on file     Non-medical: Not on file   Tobacco Use    Smoking status: Never Smoker    Smokeless tobacco: Never Used   Substance and Sexual Activity    Alcohol use:  Yes     Alcohol/week: 0.0 oz     Comment: social     Drug use: Yes     Types: Marijuana     Comment: edible cannabis for pain    Sexual activity: Yes     Partners: Male     Birth control/protection: Pill   Lifestyle    Physical activity:     Days per week: Not on file     Minutes per session: Not on file    Stress: Not on file   Relationships    Social connections:     Talks on phone: Not on file     Gets together: Not on file     Attends Zoroastrianism service: Not on file     Active member of club or organization: Not on file     Attends meetings of clubs or organizations: Not on file     Relationship status: Not on file    Intimate partner violence:     Fear of current or ex partner: Not on file     Emotionally abused: Not on file     Physically abused: Not on file     Forced sexual activity: Not on file   Other Topics Concern    Not on file   Social History Narrative    Not on file       CURRENT MEDICATIONS:     Current Outpatient Medications   Medication Sig Dispense Refill    CRYSELLE-28 0.3-30 MG-MCG per tablet take 1 tablet by mouth once daily 28 tablet 11    omeprazole (PRILOSEC) 20 MG delayed release capsule take 1 capsule by mouth once daily 30 capsule 3    RA VITAMIN D-3 2000 units CAPS take 1 capsule by mouth once daily 90 capsule 1    atorvastatin (LIPITOR) 20 MG tablet take 1 tablet by mouth once daily 90 tablet 1    SUMAtriptan (IMITREX) 6 MG/0.5ML SOLN injection Inject 6 mg into the skin as needed (migraine) No more than 2 doses in a 24 hr period 1 mL 3    ondansetron (ZOFRAN) 4 MG tablet Take 4 mg by mouth as needed       ibuprofen (ADVIL;MOTRIN) 800 MG tablet ibuprofen 800 mg tablet   Take 1 tablet as needed by oral route for 20 days.       albuterol sulfate HFA (VENTOLIN HFA) 108 (90 Base) MCG/ACT inhaler Ventolin HFA 90 mcg/actuation aerosol inhaler      fluticasone (FLONASE) 50 MCG/ACT nasal spray 2 sprays by Nasal route      diphenhydrAMINE (BENADRYL) 25 MG tablet Take 25 mg by mouth every 6 hours as needed for Itching No current facility-administered medications for this visit.          ALLERGIES:     Allergies   Allergen Reactions    Food Hives     Oysters, clams, mussels, scallops    Bactrim [Sulfamethoxazole-Trimethoprim] Hives    Benzocaine     Decadron [Dexamethasone]      Hives and hot flashes      Seasonal     Sulfamethoxazole-Trimethoprim Hives    Tetanus Toxoid, Adsorbed     Tetanus Toxoids                              REVIEW OF SYSTEMS    CONSTITUTIONAL Weight: absent, Appetite: absent, Fatigue: present      HEENT Ears: hearing loss, ringing, pain, Visual disturbance: absent   RESPIRATORY Shortness of breath: present, Cough: absent   CARDIOVASCULAR Chest pain: present, Leg swelling :present      GI Constipation: present, Diarrhea: absent, Swallowing change: absent       Urinary frequency: absent, Urinary urgency: absent, Urinary incontinence: absent   MUSCULOSKELETAL Neck pain: present, Back pain: present, Stiffness: present, Muscle pain: present, Joint pain: present Restless legs: absent   DERMATOLOGIC Hair loss: present, Skin changes: absent   NEUROLOGIC Memory loss: present, Confusion: absent, Seizures: present Trouble walking or imbalance: present, Dizziness: present, Weakness: present, Numbness: present Tremor: present, Spasm: present, Speech difficulty: absent, Headache: present, Light sensitivity: present   PSYCHIATRIC Anxiety: present, Hallucination: absent, Mood disorder: present, depression   HEMATOLOGIC Abnormal bleeding: absent, Anemia: absent, Clotting disorder: absent, Lymph gland changes: absent           PHYSICAL EXAMINATION:       Vitals:    04/23/19 1555   BP: 122/86   Pulse: 81                                              .                                                                                                    General Appearance:  Alert, cooperative, no signs of distress, appears stated age   Head:  Normocephalic, no signs of trauma   Eyes:  Conjunctiva/corneas clear;  eyelids intact   Ears:  Normal external ear and canals   Nose: Nares normal, mucosa normal, no drainage    Throat: Lips and tongue normal; teeth normal;  gums normal   Neck: Supple, intact flexion, extension and rotation;   trachea midline;  no adenopathy;   thyroid: not enlarged;   no carotid pulse abnormality   Back:   Symmetric, no curvature, ROM adequate   Lungs:   Respirations unlabored   Heart:  Regular rate and rhythm           Extremities: Extremities normal, no cyanosis, no edema   Pulses: Symmetric over head and neck   Skin: Skin color, texture normal, no rashes, no lesions                                     NEUROLOGIC EXAMINATION      Mental status    Alert and oriented x 3; intact memory with no confusion, speech or language problems; no hallucinations or delusions  Fund of information appropriate for level of education    Cranial nerves    II - visual fields intact to confrontation , fundoscopy showed no  papiledema                                                III, IV, VI - extra-ocular muscles full: no pupillary defect; no ROBBIE, no nystagmus, no ptosis   V - normal facial sensation                                                               VII - normal facial symmetry                                                             VIII - intact hearing                                                                             IX, X - symmetrical palate                                                                  XI - symmetrical shoulder shrug                                                       XII - tongue midline without atrophy or fasciculation      Motor function  Normal muscle bulk and tone; strength 5/5 on all 4 extremities, no pronator drift      Sensory function Intact to light touch, pinprick, vibration, proprioception on all 4 extremities      Cerebellar Intact fine motor movement. No involuntary movements or tremors.  No ataxia or dysmetria on finger to nose or heel to shin testing    Reflex function DTR 2+ on bilateral UE and LE, symmetric. Negative Babinski      Gait                   normal base and arm swing              ASSESSMENT AND PLAN:       This is a 41 y.o. female who comes in for follow up for chronic migraine without aura. She was previously doing well on oral prophylactic medications, but they have stopped working. She currently has a daily headache, 2 of which per week will be severe migraines. Discussed treatment options with the patient, including Botox in the new CGRP medications. She would like to think about it and then call me with her decision. Continue Imitrex SQ for rescue, which works well. Follow-up in 3 months. Clifford Balderas MD  Neurology and Sleep Medicine  [facility].    Please note that this chart was generated using voice recognition Dragon dictation software. Although every effort was made to ensure the accuracy of this automated transcription, some errors in transcription may have occurred.

## 2019-04-25 ENCOUNTER — APPOINTMENT (OUTPATIENT)
Dept: CT IMAGING | Age: 39
End: 2019-04-25
Payer: COMMERCIAL

## 2019-04-25 ENCOUNTER — HOSPITAL ENCOUNTER (EMERGENCY)
Age: 39
Discharge: HOME | End: 2019-04-25
Attending: EMERGENCY MEDICINE
Payer: COMMERCIAL

## 2019-04-25 ENCOUNTER — TELEPHONE (OUTPATIENT)
Dept: FAMILY MEDICINE CLINIC | Age: 39
End: 2019-04-25

## 2019-04-25 VITALS
HEIGHT: 62 IN | BODY MASS INDEX: 33.49 KG/M2 | OXYGEN SATURATION: 98 % | RESPIRATION RATE: 14 BRPM | WEIGHT: 182 LBS | TEMPERATURE: 98.3 F | HEART RATE: 75 BPM | SYSTOLIC BLOOD PRESSURE: 134 MMHG | DIASTOLIC BLOOD PRESSURE: 75 MMHG

## 2019-04-25 DIAGNOSIS — M54.50 BILATERAL LOW BACK PAIN WITHOUT SCIATICA, UNSPECIFIED CHRONICITY: Primary | ICD-10-CM

## 2019-04-25 LAB
-: ABNORMAL
AMORPHOUS: ABNORMAL
BACTERIA: ABNORMAL
BILIRUBIN URINE: NEGATIVE
CASTS UA: ABNORMAL /LPF
COLOR: YELLOW
COMMENT UA: ABNORMAL
CRYSTALS, UA: ABNORMAL /HPF
EPITHELIAL CELLS UA: ABNORMAL /HPF (ref 0–5)
GLUCOSE URINE: NEGATIVE
HCG(URINE) PREGNANCY TEST: NEGATIVE
KETONES, URINE: ABNORMAL
LEUKOCYTE ESTERASE, URINE: NEGATIVE
MUCUS: ABNORMAL
NITRITE, URINE: NEGATIVE
OTHER OBSERVATIONS UA: ABNORMAL
PH UA: 5 (ref 5–8)
PROTEIN UA: NEGATIVE
RBC UA: ABNORMAL /HPF (ref 0–2)
RENAL EPITHELIAL, UA: ABNORMAL /HPF
SPECIFIC GRAVITY UA: 1.03 (ref 1–1.03)
TRICHOMONAS: ABNORMAL
TURBIDITY: CLEAR
URINE HGB: ABNORMAL
UROBILINOGEN, URINE: NORMAL
WBC UA: ABNORMAL /HPF (ref 0–5)
YEAST: ABNORMAL

## 2019-04-25 PROCEDURE — 96372 THER/PROPH/DIAG INJ SC/IM: CPT

## 2019-04-25 PROCEDURE — 84703 CHORIONIC GONADOTROPIN ASSAY: CPT

## 2019-04-25 PROCEDURE — 81001 URINALYSIS AUTO W/SCOPE: CPT

## 2019-04-25 PROCEDURE — 87086 URINE CULTURE/COLONY COUNT: CPT

## 2019-04-25 PROCEDURE — 99284 EMERGENCY DEPT VISIT MOD MDM: CPT

## 2019-04-25 PROCEDURE — 6360000002 HC RX W HCPCS: Performed by: PHYSICIAN ASSISTANT

## 2019-04-25 PROCEDURE — 74176 CT ABD & PELVIS W/O CONTRAST: CPT

## 2019-04-25 RX ORDER — KETOROLAC TROMETHAMINE 30 MG/ML
30 INJECTION, SOLUTION INTRAMUSCULAR; INTRAVENOUS EVERY 6 HOURS PRN
Status: DISCONTINUED | OUTPATIENT
Start: 2019-04-25 | End: 2019-04-25 | Stop reason: HOSPADM

## 2019-04-25 RX ORDER — ORPHENADRINE CITRATE 100 MG/1
100 TABLET, EXTENDED RELEASE ORAL 2 TIMES DAILY
Qty: 14 TABLET | Refills: 0 | Status: SHIPPED | OUTPATIENT
Start: 2019-04-25 | End: 2019-07-31 | Stop reason: ALTCHOICE

## 2019-04-25 RX ORDER — LIDOCAINE 50 MG/G
1-2 PATCH TOPICAL DAILY
Qty: 15 PATCH | Refills: 0 | Status: SHIPPED | OUTPATIENT
Start: 2019-04-25 | End: 2020-01-29

## 2019-04-25 RX ADMIN — KETOROLAC TROMETHAMINE 30 MG: 30 INJECTION, SOLUTION INTRAMUSCULAR at 14:44

## 2019-04-25 ASSESSMENT — PAIN DESCRIPTION - PAIN TYPE: TYPE: ACUTE PAIN

## 2019-04-25 ASSESSMENT — PAIN DESCRIPTION - DESCRIPTORS: DESCRIPTORS: SHOOTING;STABBING;BURNING

## 2019-04-25 ASSESSMENT — PAIN DESCRIPTION - LOCATION
LOCATION: BACK
LOCATION: BACK

## 2019-04-25 ASSESSMENT — PAIN SCALES - GENERAL
PAINLEVEL_OUTOF10: 9
PAINLEVEL_OUTOF10: 6
PAINLEVEL_OUTOF10: 9

## 2019-04-25 ASSESSMENT — PAIN DESCRIPTION - ORIENTATION: ORIENTATION: LOWER

## 2019-04-25 NOTE — LETTER
[facility]  [address] [place] [address]  Phone: [phone]  Fax: [phone]             April 25, 2019    Patient: Kyle Winkler   YOB: 1980   Date of Visit: 4/25/2019       To Whom It May Concern:    Kyle Winkler was seen and treated in our emergency department on 4/25/2019. She may return to work on 4/27/2019.       Sincerely,       [facility]      Signature:__________________________________

## 2019-04-25 NOTE — ED PROVIDER NOTES
[facility] ED  [address] [place] [address]  Phone: [phone]  Fax: [phone]      eMERGENCY dEPARTMENT eNCOUnter      Pt Name: George Meza  MRN: [id number]  [place] 1980  Date of evaluation: 4/25/2019  Provider: Yessica Barboza PA-C    CHIEF COMPLAINT       Chief Complaint   Patient presents with    Back Pain           HISTORY OF PRESENT ILLNESS  (Location/Symptom, Timing/Onset, Context/Setting, Quality, Duration, Modifying Factors, Severity.)   eGorge Meza is a 41 y.o. female who presents to the emergency department for BACK PAIN complaint:     Context/timeframe:    Pt here with nontraumatic bilat lower back pain x 1 day. She noted it felt mildly uncomfortable yesterday but awoke today and it was much worse. Hx of previous lower back pain, she reports she gets this 2-3x/week. She reports some spasming/cramping type pain of her back. No urinary symptoms other than some intermittent decreased flow. She is not on her period presently. No f/c/n/v/stooling-chest-resp symptoms. No hx of stones but has CT 18 months ago for suspected stone(s) that was negative. Hx of stones in her family. Chronic  Neck/back pain:    YES     Location/Symptom:  Lower Back,bilateral  Sciatica? NO  Spasms? YES   New rash? NO  Bladder/bowel incontinence? NO  Paresthesias? NO  Abdominal Pain? NO  Hx AAA? NO  Dysuria? NO  Quality: Aching  Duration: Persistent  Modifying Factors: Worse with bending and twisting  Severity:  Moderate    Nursing Notes were reviewed. REVIEW OF SYSTEMS    (2-9 systems for level 4, 10 or more for level 5)     Review of Systems   Constitutional: Negative. HENT: Negative. Eyes: Negative. Respiratory: Negative. Cardiovascular: Negative. Gastrointestinal: Negative. Musculoskeletal: Back pain   Endocrine: Negative. Genitourinary: Negative. Skin: Negative. Allergic/Immunologic: Negative. Neurological: Negative. TABLET    Take 25 mg by mouth every 6 hours as needed for Itching    FLUTICASONE (FLONASE) 50 MCG/ACT NASAL SPRAY    2 sprays by Nasal route    IBUPROFEN (ADVIL;MOTRIN) 800 MG TABLET    ibuprofen 800 mg tablet   Take 1 tablet as needed by oral route for 20 days. OMEPRAZOLE (PRILOSEC) 20 MG DELAYED RELEASE CAPSULE    take 1 capsule by mouth once daily    RA VITAMIN D-3 2000 UNITS CAPS    take 1 capsule by mouth once daily    SUMATRIPTAN (IMITREX) 6 MG/0.5ML SOLN INJECTION    Inject 6 mg into the skin as needed (migraine) No more than 2 doses in a 24 hr period       ALLERGIES     Food; Bactrim [sulfamethoxazole-trimethoprim]; Bee venom; Benzocaine; Decadron [dexamethasone]; Seasonal; Sulfamethoxazole-trimethoprim; Tetanus toxoid, adsorbed; and Tetanus toxoids    FAMILY HISTORY           Problem Relation Age of Onset   [place] Diabetes Father     Other Mother         blood disorder    Other Maternal Aunt         blood disorder    Cancer Maternal Aunt         breast    Other Paternal Aunt         chiari malformation type 2     Cancer Paternal Aunt         ovarian cancer    Cancer Maternal Grandmother     Cancer Maternal Grandfather     Cancer Paternal Grandmother     High Blood Pressure Paternal Grandfather     Heart Disease Paternal Grandfather     Other Maternal Aunt         brain aneurysm     Family Status   Relation Name Status    Father  Alive    Mother  Alive    MAunt  Alive    PAunt  Alive    MGM      MGF      PGM      PGF      MAunt          SOCIAL HISTORY      reports that she has never smoked. She has never used smokeless tobacco. She reports that she drinks alcohol. She reports that she has current or past drug history. Drug: Marijuana.     PHYSICAL EXAM    (up to 7 for level 4, 8 or more for level 5)     ED Triage Vitals [19 1426]   BP Temp Temp Source Pulse Resp SpO2 Height Weight   134/75 98.3 °F (36.8 °C) Oral 75 14 98 % 5' 2\" (1.575 m) 182 lb (82.6 kg)       Physical Exam   Nursing note and vitals reviewed. Constitutional: Well-developed, well-nourished  Head: Normocephalic and atraumatic. Ear: External ears normal.   Nose: Nose normal and midline. Eyes: Conjunctivae and EOM are normal. Pupils are equal, round  Cardiovascular: Normal rate, regular rhythm, normal heart sounds and intact distal pulses. Pulmonary/Chest: Effort normal and breath sounds normal. No respiratory distress. No wheezes. No rales. Abdominal: Soft. Bowel sounds are normal. No distension and no mass. There is no tenderness. Musculoskeletal:   No paraspinal or midline back tenderness. Full ROM without caution or observable pain elicited. No step-off deformity, no swelling, no bruising. No saddle paresthesias. Mild right lower back pain with right SLR. NV intact distally x4. Neurological: Alert & age appropriate mentation/interaction   Skin: Skin is warm and dry. No vesicular rash noted. Psychiatric: Mood, memory, affect and judgment normal.           DIAGNOSTIC RESULTS     RADIOLOGY:   Non-plain film images such as CT, Ultrasound and MRI are read by the radiologist. Plain radiographic images are visualized and preliminarily interpreted by the emergency physician with the below findings:    Interpretation per the Radiologist below, if available at the time of this note:    [address]   Final Result   No acute abdominal or pelvic abnormality. Subtle hypodense focus within the posterior aspect of the right hepatic lobe   and MRI of the abdomen with and without contrast with hepatic protocol is   recommended for further characterization.                  LABS:  Labs Reviewed   URINE RT REFLEX TO CULTURE - Abnormal; Notable for the following components:       Result Value    Ketones, Urine SMALL (*)     Specific Gravity, UA 1.035 (*)     Urine Hgb SMALL (*)     All other components within normal limits   MICROSCOPIC URINALYSIS - Abnormal; Notable for the following components:    Bacteria, UA FEW (*)     Other Observations UA Culture ordered based on defined criteria. (*)     All other components within normal limits   URINE CULTURE CLEAN CATCH   PREGNANCY, URINE         All other labs were within normal range or not returned as of this dictation. EMERGENCY DEPARTMENT COURSE and DIFFERENTIAL DIAGNOSIS/MDM:   Vitals:    Vitals:    04/25/19 1426   BP: 134/75   Pulse: 75   Resp: 14   Temp: 98.3 °F (36.8 °C)   TempSrc: Oral   SpO2: 98%   Weight: 82.6 kg (182 lb)   Height: 5' 2\" (1.575 m)       1436  Bilat lower back paraspinal pain. No confirmed stone hx and no present urinary symptoms other than slightly decreased flow. Hx of lower back pain/sciatica. Abd benign. We are going to check a UA first.  Toradol IM ordered. 1530  Adding CT Stone as microscopic hematuria. She feels much better after Toradol.  1702  Attending discharged this patient after discussing all labwork/imaging results that were finalized. Treatment plan and recommended follow-up discussed with them as well. PCP f/u in 3 days, Norflex/Lidoderm/rest for comfort. CONSULTS:  None    PROCEDURES:  None    The patient presents with low back pain without signs of spinal cord compression, cauda equina syndrome, infection, aneurysm or other serious etiology. The patient is neurologically intact. Given the extremely low risk of these diagnoses further testing and evaluation for these possibilities does not appear to be indicated at this time. The patient has been instructed to return if the symptoms worsen or change in any way. I have reviewed the disposition diagnosis with the patient and or their family/guardian. I have answered their questions and given discharge instructions. They voiced understanding of these instructions and did not have any further questions or complaints. FINAL IMPRESSION      1.  Bilateral low back pain without sciatica, unspecified chronicity DISPOSITION/PLAN   DISPOSITION     PATIENT REFERRED TO:  Aury Adame PA-C  [address]  [address]  [phone]    Schedule an appointment as soon as possible for a visit in 3 days  for re-evaluation of your symptoms    [facility] ED  [address] [place] [address]  [phone]  Go to   for worsening of your symptoms      DISCHARGE MEDICATIONS:  New Prescriptions    LIDOCAINE (LIDODERM) 5 %    Place 1-2 patches onto the skin daily 12 hours on, 12 hours off.     ORPHENADRINE (NORFLEX) 100 MG EXTENDED RELEASE TABLET    Take 1 tablet by mouth 2 times daily       (Please note that portions of this note were completed with a voice recognition program.  Efforts were made to edit the dictations but occasionally words are mis-transcribed.)    ANITA Trinidad PA-C  04/25/19 1937

## 2019-04-25 NOTE — ED PROVIDER NOTES
midline back tenderness to palpation no rash appreciated. Neurologic: Moving all 4 extremities without difficulty there are no gross focal neurologic deficits   Skin: Warm and dry       Physical Exam  DIAGNOSTIC RESULTS     EKG: All EKG's areinterpreted by the Emergency Department Physician who either signs or Co-signs this chart in the absence of a cardiologist.    Not indicated unless otherwise documented above or in the midlevel documentation    LABS:  Results for orders placed or performed during the hospital encounter of 04/25/19   Urinalysis Reflex to Culture   Result Value Ref Range    Color, UA YELLOW YELLOW    Turbidity UA CLEAR CLEAR    Glucose, Ur NEGATIVE NEGATIVE    Bilirubin Urine NEGATIVE NEGATIVE    Ketones, Urine SMALL (A) NEGATIVE    Specific Gravity, UA 1.035 (H) 1.005 - 1.030    Urine Hgb SMALL (A) NEGATIVE    pH, UA 5.0 5.0 - 8.0    Protein, UA NEGATIVE NEGATIVE    Urobilinogen, Urine Normal Normal    Nitrite, Urine NEGATIVE NEGATIVE    Leukocyte Esterase, Urine NEGATIVE NEGATIVE    Urinalysis Comments NOT REPORTED    Microscopic Urinalysis   Result Value Ref Range    -          WBC, UA 5 TO 10 0 - 5 /HPF    RBC, UA 0 TO 2 0 - 2 /HPF    Casts UA NOT REPORTED /LPF    Crystals UA NOT REPORTED None /HPF    Epithelial Cells UA 10 TO 20 0 - 5 /HPF    Renal Epithelial, Urine NOT REPORTED 0 /HPF    Bacteria, UA FEW (A) None    Mucus, UA NOT REPORTED None    Trichomonas, UA NOT REPORTED None    Amorphous, UA NOT REPORTED None    Other Observations UA Culture ordered based on defined criteria. (A) NOT REQ. Yeast, UA NOT REPORTED None   Pregnancy, Urine   Result Value Ref Range    HCG(Urine) Pregnancy Test NEGATIVE NEGATIVE       Not indicated unless otherwise documented above or in the midlevel documentation    RADIOLOGY:   I reviewedthe radiologist interpretations:  CT ABDOMEN PELVIS WO CONTRAST   Final Result   No acute abdominal or pelvic abnormality.       Subtle hypodense focus within the posterior aspect of the right hepatic lobe   and MRI of the abdomen with and without contrast with hepatic protocol is   recommended for further characterization. Not indicated unless otherwise documented above or in the midlevel documentation    EMERGENCY DEPARTMENT COURSE:       PERTINENT ATTENDING PHYSICIAN COMMENTS:    No injury. Bilateral flank pain left greater than right. Denies hematuria or dysuria and no urinary symptoms. Pain 9/10 sharp and shooting nothing really makes it better or worse. We'll check urinalysis    3:15 PM urinalysis positive hematuria will obtain a CT. Awaiting pregnancy test.    5 PM CAT scan no acute process. There is a hypodensity subtle in the liver. Recommend following up with family physician for further testing.      Dali Moreno,   04/25/19 6543

## 2019-04-25 NOTE — ED NOTES
Patient cleared for discharge per MD. Patient discharge instructions explained, Rx given and explained to patient. Patient Verbalized understanding of all instructions and all patient questions answered to their satisfaction. Patient departs from ED in stable condition.         Renu Rodrigues RN  04/25/19 4198

## 2019-04-26 ENCOUNTER — TELEPHONE (OUTPATIENT)
Dept: FAMILY MEDICINE CLINIC | Age: 39
End: 2019-04-26

## 2019-04-26 NOTE — TELEPHONE ENCOUNTER
[facility]) ED Follow up Call    Reason for ED visit:           Sarbjit Parr , this is Moldsylwia from Dr. Tatiana Hallman office, just calling to see how you are doing after your recent ED visit. Did you receive discharge instructions? yes   Do you understand the discharge instructions? yes  Did the ED give you any new prescriptions? yes  Were you able to fill your prescriptions? yes    Do you have one of our red, yellow and green  Zone sheets that help you to determine when you should go to the ED? Not Applicable      Do you need or want to make a follow up appt with your PCP? Yes scheduled     Do you have any further needs in the home i.e. Equipment?  no        FU appts/Provider:    Future Appointments   Date Time Provider Skylar Foster   5/22/2019  1:15 PM ANITA Quiros PC [facility]   7/16/2019 10:00 AM Debra Carroll MD [facility]   7/31/2019 11:00 AM [facility]       VOICEMAIL DOCUMENTATION - ERASE IF NOT USED  Hi, this message is for  Umu Carbone  This is Ruslan Hernandez from [address] [facility] office. Just calling to see how you are doing after your recent visit to the Emergency Room. [address] [facility] [address] wants to make sure you were able to fill any prescriptions and that you understand your discharge instructions. Please return our call if you need to make a follow up appointment with your provider or have any further needs. Our phone number is [phone]. Have a great day.

## 2019-04-26 NOTE — TELEPHONE ENCOUNTER
Please call pt and set up ed f/u with josé miguel. Use OVE visit type and dot phrase edfucall, f2 through to answer questions.

## 2019-04-27 LAB
CULTURE: NORMAL
Lab: NORMAL
SPECIMEN DESCRIPTION: NORMAL

## 2019-04-30 ENCOUNTER — OFFICE VISIT (OUTPATIENT)
Dept: FAMILY MEDICINE CLINIC | Age: 39
End: 2019-04-30
Payer: COMMERCIAL

## 2019-04-30 VITALS
BODY MASS INDEX: 33.49 KG/M2 | DIASTOLIC BLOOD PRESSURE: 70 MMHG | HEIGHT: 62 IN | WEIGHT: 182 LBS | HEART RATE: 79 BPM | SYSTOLIC BLOOD PRESSURE: 108 MMHG | TEMPERATURE: 98.5 F | OXYGEN SATURATION: 98 %

## 2019-04-30 DIAGNOSIS — M54.6 CHRONIC BILATERAL THORACIC BACK PAIN: Primary | ICD-10-CM

## 2019-04-30 DIAGNOSIS — M54.50 LUMBAR BACK PAIN: ICD-10-CM

## 2019-04-30 DIAGNOSIS — G89.29 CHRONIC BILATERAL THORACIC BACK PAIN: Primary | ICD-10-CM

## 2019-04-30 PROCEDURE — G8417 CALC BMI ABV UP PARAM F/U: HCPCS | Performed by: PHYSICIAN ASSISTANT

## 2019-04-30 PROCEDURE — G8427 DOCREV CUR MEDS BY ELIG CLIN: HCPCS | Performed by: PHYSICIAN ASSISTANT

## 2019-04-30 PROCEDURE — 99213 OFFICE O/P EST LOW 20 MIN: CPT | Performed by: PHYSICIAN ASSISTANT

## 2019-04-30 PROCEDURE — 1036F TOBACCO NON-USER: CPT | Performed by: PHYSICIAN ASSISTANT

## 2019-04-30 ASSESSMENT — PATIENT HEALTH QUESTIONNAIRE - PHQ9
SUM OF ALL RESPONSES TO PHQ QUESTIONS 1-9: 1
1. LITTLE INTEREST OR PLEASURE IN DOING THINGS: 0
SUM OF ALL RESPONSES TO PHQ9 QUESTIONS 1 & 2: 1
SUM OF ALL RESPONSES TO PHQ QUESTIONS 1-9: 1
2. FEELING DOWN, DEPRESSED OR HOPELESS: 1

## 2019-04-30 ASSESSMENT — ENCOUNTER SYMPTOMS
ABDOMINAL PAIN: 0
SHORTNESS OF BREATH: 0
CHEST TIGHTNESS: 0
BACK PAIN: 1

## 2019-04-30 NOTE — PROGRESS NOTES
[address] PRIMARY CARE  [address] [facility] [place] [address]  Dept: [phone]  Dept Fax: [phone]    Office Progress Note  Date of patient's visit: 4/30/2019  Patient's Name:  Mary Cerna YOB: 1980            [facility]  ================================================================    REASON FOR VISIT/CHIEF COMPLAINT:  ED Follow-up (back pain) and Health Maintenance (patient will discuss HM with provider)    HISTORY OF PRESENTING ILLNESS:  History was obtained from: patient. Mary Cerna is a 41 y.o. is here for follow up for acute exacerbation of chronic back pain. Patient has had chronic thoracic and lumbar back pain which flares up 2-3 times a week. She was in the emergency room a couple of days ago and evaluated. Patient was given prescriptions for muscle relaxers and has been taking anti-inflammatories. Patient states that she has been following with neurology for migraine headaches as well. Patient denies any loss of bladder or bowel control, but has had intermittent numbness, tingling and weakness in the lower extremities. Patient has never had an MRI of her back. She has tried physical therapy, which she states made it worse. Patient has done her therapy which made it better, but she states her insurance will not pay for her to have it done again. Patient denies any new symptoms at this time.       Patient Active Problem List   Diagnosis    Asthma    Family planning    Murmur    Seasonal allergies    Elevated WBC count    Abnormal TSH    Hyperlipidemia    Vitamin D deficiency    Headache    Need for pneumococcal vaccination    Enlarged tonsils    Generalized abdominal pain    Abnormal ultrasound of gallbladder    Nausea & vomiting    Rectal bleeding    Colitis    BMI 35.0-35.9,adult    S/P laparoscopic cholecystectomy    Symptomatic cholelithiasis    Post-operative nausea and vomiting    Hypothyroidism       Health Maintenance Due   Topic Date Due    Varicella Vaccine (1 of 2 - 13+ 2-dose series) 11/14/1995       Allergies   Allergen Reactions    Food Hives     Oysters, clams, mussels, scallops    Bactrim [Sulfamethoxazole-Trimethoprim] Hives    Bee Venom Swelling    Benzocaine     Decadron [Dexamethasone]      Hives and hot flashes      Seasonal     Sulfamethoxazole-Trimethoprim Hives    Tetanus Toxoid, Adsorbed     Tetanus Toxoids          Current Outpatient Medications   Medication Sig Dispense Refill    lidocaine (LIDODERM) 5 % Place 1-2 patches onto the skin daily 12 hours on, 12 hours off. 15 patch 0    orphenadrine (NORFLEX) 100 MG extended release tablet Take 1 tablet by mouth 2 times daily 14 tablet 0    CRYSELLE-28 0.3-30 MG-MCG per tablet take 1 tablet by mouth once daily 28 tablet 11    omeprazole (PRILOSEC) 20 MG delayed release capsule take 1 capsule by mouth once daily 30 capsule 3    RA VITAMIN D-3 2000 units CAPS take 1 capsule by mouth once daily 90 capsule 1    atorvastatin (LIPITOR) 20 MG tablet take 1 tablet by mouth once daily 90 tablet 1    SUMAtriptan (IMITREX) 6 MG/0.5ML SOLN injection Inject 6 mg into the skin as needed (migraine) No more than 2 doses in a 24 hr period 1 mL 3    ibuprofen (ADVIL;MOTRIN) 800 MG tablet ibuprofen 800 mg tablet   Take 1 tablet as needed by oral route for 20 days.  albuterol sulfate HFA (VENTOLIN HFA) 108 (90 Base) MCG/ACT inhaler Ventolin HFA 90 mcg/actuation aerosol inhaler      fluticasone (FLONASE) 50 MCG/ACT nasal spray 2 sprays by Nasal route      diphenhydrAMINE (BENADRYL) 25 MG tablet Take 25 mg by mouth every 6 hours as needed for Itching       No current facility-administered medications for this visit. Social History     Tobacco Use    Smoking status: Never Smoker    Smokeless tobacco: Never Used   Substance Use Topics    Alcohol use:  Yes     Alcohol/week: 0.0 oz     Comment: social     Drug use: and spasm. She exhibits no bony tenderness, no swelling, no edema, no deformity, no laceration and normal pulse. Neurological: She is oriented to person, place, and time. No cranial nerve deficit or sensory deficit. She exhibits normal muscle tone. Skin: Skin is warm and dry. Psychiatric: She has a normal mood and affect. Her behavior is normal. Judgment and thought content normal.   Nursing note and vitals reviewed. Vitals:    04/30/19 1519   BP: 108/70   Pulse: 79   Temp: 98.5 °F (36.9 °C)   TempSrc: Oral   SpO2: 98%   Weight: 182 lb (82.6 kg)   Height: 5' 2\" (1.575 m)     BP Readings from Last 3 Encounters:   04/30/19 108/70   04/25/19 134/75   04/23/19 122/86              DIAGNOSTIC FINDINGS:  CBC:  Lab Results   Component Value Date    WBC 13.2 02/07/2019    HGB 14.0 02/07/2019     02/07/2019       BMP:    Lab Results   Component Value Date     08/31/2018    K 4.0 08/31/2018     08/31/2018    CO2 22 08/31/2018    BUN 9 08/31/2018    CREATININE 0.51 08/31/2018    GLUCOSE 76 08/31/2018         FASTING LIPID PANEL:  Lab Results   Component Value Date    CHOL 138 08/31/2018    HDL 55 08/31/2018    TRIG 127 08/31/2018       No results found for this visit on 04/30/19. ASSESSMENT AND PLAN:   Diagnosis Orders   1. Chronic bilateral thoracic back pain  MRI LUMBAR SPINE WO CONTRAST    MRI THORACIC SPINE WO CONTRAST   2. Lumbar back pain  MRI LUMBAR SPINE WO CONTRAST    MRI THORACIC SPINE WO CONTRAST       FOLLOW UP AND INSTRUCTIONS:  Return in about 1 month (around 5/30/2019), or if symptoms worsen or fail to improve. · Discussed use, benefit, and side effects of prescribed medications. Barriers to medication compliance addressed. All patient questions answered. Pt voiced understanding. · Patient instructed to return to the office if symptoms do not resolve or go directly to the ER if the symptoms worsen - patient voiced understanding.     · Patient given educational materials - see patient instructions    [address]  4/30/2019, 3:43 PM    This note is created with the assistance of a speech-recognition program. While intending to generate a document that actually reflects the content of the visit, the document can still have some mistakes which may not have been identified and corrected by editing.

## 2019-04-30 NOTE — PROGRESS NOTES
Visit Information    Have you changed or started any medications since your last visit including any over-the-counter medicines, vitamins, or herbal medicines? no   Are you having any side effects from any of your medications? -  no  Have you stopped taking any of your medications? Is so, why? -  no    Have you seen any other physician or provider since your last visit? Yes - Records Obtained  Have you had any other diagnostic tests since your last visit? Yes - Records Obtained  Have you been seen in the emergency room and/or had an admission to a hospital since we last saw you? Yes - Records Obtained  Have you had your routine dental cleaning in the past 6 months? yes    Have you activated your [employer] account? If not, what are your barriers?  Yes     Patient Care Team:  Irene Clark PA-C as PCP - General (Physician Assistant)  Irene Clark PA-C as PCP - [facility] Attributed Provider  Benito Lucio MD as Consulting Physician (Hematology and Oncology)  Sangita Kyle DO as Obstetrician (Obstetrics & Gynecology)    Medical History Review  Past Medical, Family, and Social History reviewed and does contribute to the patient presenting condition    Health Maintenance   Topic Date Due    Varicella Vaccine (1 of 2 - 13+ 2-dose series) 11/14/1995    TSH testing  08/31/2019    Cervical cancer screen  11/13/2021    Flu vaccine  Completed    Pneumococcal 0-64 years Vaccine  Completed    HIV screen  Completed

## 2019-05-08 ENCOUNTER — HOSPITAL ENCOUNTER (OUTPATIENT)
Dept: MRI IMAGING | Facility: CLINIC | Age: 39
Discharge: HOME | End: 2019-05-10
Payer: COMMERCIAL

## 2019-05-08 DIAGNOSIS — M54.50 LUMBAR BACK PAIN: ICD-10-CM

## 2019-05-08 DIAGNOSIS — G89.29 CHRONIC BILATERAL THORACIC BACK PAIN: ICD-10-CM

## 2019-05-08 DIAGNOSIS — M54.6 CHRONIC BILATERAL THORACIC BACK PAIN: ICD-10-CM

## 2019-05-08 PROCEDURE — 72148 MRI LUMBAR SPINE W/O DYE: CPT

## 2019-05-08 PROCEDURE — 72146 MRI CHEST SPINE W/O DYE: CPT

## 2019-05-09 ENCOUNTER — TELEPHONE (OUTPATIENT)
Dept: FAMILY MEDICINE CLINIC | Age: 39
End: 2019-05-09

## 2019-05-09 DIAGNOSIS — M54.50 LUMBAR BACK PAIN: ICD-10-CM

## 2019-05-09 DIAGNOSIS — G89.29 CHRONIC BILATERAL THORACIC BACK PAIN: Primary | ICD-10-CM

## 2019-05-09 DIAGNOSIS — M54.6 CHRONIC BILATERAL THORACIC BACK PAIN: Primary | ICD-10-CM

## 2019-05-09 NOTE — TELEPHONE ENCOUNTER
Patient notified on MRI results, and wants to know next steps since that was normal. Please advise thank you!       Last visit: 4/30/19  Last Med refill:     Next Visit Date:  Future Appointments   Date Time Provider Skylar Shoemakeri   5/22/2019  1:15 PM ANITA Mckinley PC [email or web address]   7/16/2019 10:00 AM Katerina Reardon MD [facility] [[email or web address]   7/31/2019 11:00 AM Jared Neely MD [facility] [[email or web address]       Health Maintenance   Topic Date Due    Varicella Vaccine (1 of 2 - 13+ 2-dose series) 11/14/1995    TSH testing  08/31/2019    Cervical cancer screen  11/13/2021    Flu vaccine  Completed    Pneumococcal 0-64 years Vaccine  Completed    HIV screen  Completed       Hemoglobin A1C (%)   Date Value   02/12/2018 5.0             ( goal A1C is < 7)   No results found for: LABMICR  LDL Cholesterol (mg/dL)   Date Value   08/31/2018 58   08/14/2017 76       (goal LDL is <100)   AST (U/L)   Date Value   08/31/2018 15     ALT (U/L)   Date Value   08/31/2018 10     BUN (mg/dL)   Date Value   08/31/2018 9     BP Readings from Last 3 Encounters:   04/30/19 108/70   04/25/19 134/75   04/23/19 122/86          (goal 120/80)    All Future Testing planned in [facility]  Lab Frequency Next Occurrence   GYN Cytology Once 01/01/2019   CBC Auto Differential                 Patient Active Problem List:     Asthma     Family planning     Murmur     Seasonal allergies     Elevated WBC count     Abnormal TSH     Hyperlipidemia     Vitamin D deficiency     Headache     Need for pneumococcal vaccination     Enlarged tonsils     Generalized abdominal pain     Abnormal ultrasound of gallbladder     Nausea & vomiting     Rectal bleeding     Colitis     BMI 35.0-35.9,adult     S/P laparoscopic cholecystectomy     Symptomatic cholelithiasis     Post-operative nausea and vomiting     Hypothyroidism

## 2019-05-10 NOTE — TELEPHONE ENCOUNTER
Referral for pain management pended. Please advise thank you.      Fax# for referral is [phone]

## 2019-05-22 ENCOUNTER — OFFICE VISIT (OUTPATIENT)
Dept: FAMILY MEDICINE CLINIC | Age: 39
End: 2019-05-22
Payer: COMMERCIAL

## 2019-05-22 VITALS
OXYGEN SATURATION: 100 % | WEIGHT: 181.6 LBS | DIASTOLIC BLOOD PRESSURE: 83 MMHG | TEMPERATURE: 98 F | HEIGHT: 62 IN | SYSTOLIC BLOOD PRESSURE: 123 MMHG | HEART RATE: 85 BPM | BODY MASS INDEX: 33.42 KG/M2

## 2019-05-22 DIAGNOSIS — E55.9 VITAMIN D DEFICIENCY: ICD-10-CM

## 2019-05-22 DIAGNOSIS — R29.898 WEAKNESS OF BOTH LOWER EXTREMITIES: ICD-10-CM

## 2019-05-22 DIAGNOSIS — R79.89 ABNORMAL TSH: ICD-10-CM

## 2019-05-22 DIAGNOSIS — J45.909 UNCOMPLICATED ASTHMA, UNSPECIFIED ASTHMA SEVERITY, UNSPECIFIED WHETHER PERSISTENT: Chronic | ICD-10-CM

## 2019-05-22 DIAGNOSIS — E78.5 HYPERLIPIDEMIA, UNSPECIFIED HYPERLIPIDEMIA TYPE: Primary | Chronic | ICD-10-CM

## 2019-05-22 DIAGNOSIS — R51.9 ACUTE NONINTRACTABLE HEADACHE, UNSPECIFIED HEADACHE TYPE: ICD-10-CM

## 2019-05-22 PROCEDURE — 1036F TOBACCO NON-USER: CPT | Performed by: PHYSICIAN ASSISTANT

## 2019-05-22 PROCEDURE — G8427 DOCREV CUR MEDS BY ELIG CLIN: HCPCS | Performed by: PHYSICIAN ASSISTANT

## 2019-05-22 PROCEDURE — G8417 CALC BMI ABV UP PARAM F/U: HCPCS | Performed by: PHYSICIAN ASSISTANT

## 2019-05-22 PROCEDURE — 99214 OFFICE O/P EST MOD 30 MIN: CPT | Performed by: PHYSICIAN ASSISTANT

## 2019-05-22 ASSESSMENT — ENCOUNTER SYMPTOMS
ABDOMINAL PAIN: 0
SINUS PRESSURE: 0
STRIDOR: 0
SORE THROAT: 0
BACK PAIN: 0
WATER BRASH: 0
CHOKING: 0
WHEEZING: 0
HEARTBURN: 0
SHORTNESS OF BREATH: 0
CHEST TIGHTNESS: 0
RHINORRHEA: 0
HOARSE VOICE: 0
FACIAL SWEATING: 0
GLOBUS SENSATION: 0
EYE DISCHARGE: 0
COUGH: 0
EYE ITCHING: 0

## 2019-05-22 NOTE — PROGRESS NOTES
[address]  [address] [place] [address]  Dept: [phone]  Dept Fax: [phone]    Hallie Bert. Matt Coello is a 41 y.o. female who presents today for her medical conditions/complaints as noted below. Hallie Bertteresa Coello is c/o of   Chief Complaint   Patient presents with    6 Month Follow-Up     Visit Information    Have you changed or started any medications since your last visit including any over-the-counter medicines, vitamins, or herbal medicines? no   Have you stopped taking any of your medications? Is so, why? -  no  Are you having any side effects from any of your medications? - no    Have you seen any other physician or provider since your last visit?  no   Have you had any other diagnostic tests since your last visit?  no   Have you been seen in the emergency room and/or had an admission in a hospital since we last saw you?  no   Have you had your routine dental cleaning in the past 6 months?  yes -      Do you have an active MyChart account? If no, what is the barrier? No:     Patient Care Team:  Shakeel Quinn PA-C as PCP - General (Physician Assistant)  Shakeel Quinn PA-C as PCP - S Attributed Provider  General MD Kiara as Consulting Physician (Hematology and Oncology)  Karen Parra DO as Obstetrician (Obstetrics & Gynecology)    Medical History Review  Past Medical, Family, and Social History reviewed and does contribute to the patient presenting condition    Health Maintenance   Topic Date Due    Varicella Vaccine (1 of 2 - 13+ 2-dose series) 06/07/2019 (Originally 11/14/1995)    TSH testing  08/31/2019    Cervical cancer screen  11/13/2021    Flu vaccine  Completed    Pneumococcal 0-64 years Vaccine  Completed    HIV screen  Completed                 HPI:     Headache    This is a chronic problem. The pain quality is similar to prior headaches.  Pertinent negatives include no abdominal pain, abnormal behavior, anorexia, back pain, coughing, dizziness, ear pain, facial sweating, fever, hearing loss, insomnia, neck pain, numbness, rhinorrhea, sinus pressure, sore throat, weakness or weight loss. There is no history of obesity. Hyperlipidemia   This is a chronic problem. The current episode started more than 1 year ago. She has no history of chronic renal disease, diabetes, hypothyroidism, liver disease or obesity. Pertinent negatives include no chest pain, focal sensory loss, myalgias or shortness of breath. The current treatment provides moderate improvement of lipids. Risk factors for coronary artery disease include obesity and dyslipidemia. Gastroesophageal Reflux   She reports no abdominal pain, no chest pain, no choking, no coughing, no dysphagia, no early satiety, no globus sensation, no heartburn, no hoarse voice, no sore throat, no stridor, no tooth decay, no water brash or no wheezing. This is a chronic problem. Associated symptoms include fatigue. Pertinent negatives include no anemia, melena, muscle weakness, orthopnea or weight loss. Past procedures do not include an abdominal ultrasound or an EGD. Past invasive treatments do not include gastroplasty.        Hemoglobin A1C (%)   Date Value   02/12/2018 5.0             ( goal A1C is < 7)   No results found for: LABMICR  LDL Cholesterol (mg/dL)   Date Value   08/31/2018 58   08/14/2017 76   08/18/2016 75       (goal LDL is <100)   AST (U/L)   Date Value   08/31/2018 15     ALT (U/L)   Date Value   08/31/2018 10     BUN (mg/dL)   Date Value   08/31/2018 9     BP Readings from Last 3 Encounters:   05/22/19 123/83   04/30/19 108/70   04/25/19 134/75          (goal 120/80)    Past Medical History:   Diagnosis Date    Allergic rhinitis     Anesthesia     HISTORY OF LONG EMERGENCE    Aortic stenosis     SEES DR Mk Lenz AS NEEDED    Arthritis     Asthma     Dental crown present     PORCELAIN VENEERS UPPER FRONT    GERD (gastroesophageal reflux disease)     Heart murmur     HENT: Negative for congestion, ear discharge, ear pain, hearing loss, hoarse voice, postnasal drip, rhinorrhea, sinus pressure and sore throat. Eyes: Negative for discharge and itching. Respiratory: Negative for cough, choking, chest tightness, shortness of breath and wheezing. Cardiovascular: Negative for chest pain, palpitations and leg swelling. Gastrointestinal: Negative for abdominal pain, anorexia, dysphagia, heartburn and melena. Musculoskeletal: Negative for back pain, myalgias, muscle weakness, neck pain and neck stiffness. Skin: Negative for rash. Neurological: Positive for headaches. Negative for dizziness, weakness, light-headedness and numbness. Psychiatric/Behavioral: The patient does not have insomnia. All other systems reviewed and are negative. Objective:     Physical Exam   Constitutional: She is oriented to person, place, and time. She appears well-developed and well-nourished. No distress. /72  Pulse 90  Temp 98.6 °F (37 °C) (Oral)   Ht 5' 2\" (1.575 m)  Wt 196 lb 12.8 oz (89.3 kg)  LMP 04/07/2017 (Exact Date)  SpO2 96%  BMI 36 kg/m2     HENT:   Head: Normocephalic and atraumatic. Right Ear: External ear normal.   Left Ear: External ear normal.   Nose: Nose normal.   Mouth/Throat: Oropharynx is clear and moist.   Eyes: Pupils are equal, round, and reactive to light. Conjunctivae and EOM are normal. Right eye exhibits no discharge. Left eye exhibits no discharge. No scleral icterus. Neck: Normal range of motion. Neck supple. No tracheal deviation present. No thyromegaly present. Cardiovascular: Normal rate, regular rhythm and normal heart sounds. Exam reveals no gallop and no friction rub. No murmur heard. Pulmonary/Chest: Effort normal and breath sounds normal. No stridor. No respiratory distress. She has no wheezes. She has no rales. She exhibits no tenderness. Abdominal: Soft. Bowel sounds are normal. She exhibits no distension.  There is no tenderness. There is no rebound and no guarding. Musculoskeletal: She exhibits no edema. Neurological: She is alert and oriented to person, place, and time. No cranial nerve deficit. Coordination and gait normal.   Skin: Skin is warm and dry. No rash noted. She is not diaphoretic. Psychiatric: She has a normal mood and affect. Her affect is not inappropriate. Nursing note and vitals reviewed. /83   Pulse 85   Temp 98 °F (36.7 °C) (Oral)   Ht 5' 2\" (1.575 m)   Wt 181 lb 9.6 oz (82.4 kg)   LMP 05/02/2019 (Exact Date)   SpO2 100%   Breastfeeding? No   BMI 33.22 kg/m²     Assessment:       Diagnosis Orders   1. Hyperlipidemia, unspecified hyperlipidemia type  CBC Auto Differential    Comprehensive Metabolic Panel    Lipid Panel   2. Weakness of both lower extremities  EMG    CBC Auto Differential    Comprehensive Metabolic Panel   3. Abnormal TSH  CBC Auto Differential    Comprehensive Metabolic Panel    TSH    T4, Free   4. Vitamin D deficiency  CBC Auto Differential    Comprehensive Metabolic Panel    Vitamin D 25 Hydroxy   5. Acute nonintractable headache, unspecified headache type  CBC Auto Differential    Comprehensive Metabolic Panel   6. Uncomplicated asthma, unspecified asthma severity, unspecified whether persistent  CBC Auto Differential    Comprehensive Metabolic Panel             Plan:      Return in about 6 months (around 11/22/2019).     Orders Placed This Encounter   Procedures    CBC Auto Differential     Standing Status:   Future     Standing Expiration Date:   5/21/2020    Comprehensive Metabolic Panel     Standing Status:   Future     Standing Expiration Date:   5/22/2020    Lipid Panel     Standing Status:   Future     Standing Expiration Date:   5/21/2020     Order Specific Question:   Is Patient Fasting?/# of Hours     Answer:   yes    TSH     Standing Status:   Future     Standing Expiration Date:   5/22/2020    T4, Free     Standing Status:   Future     Standing Expiration Date:   5/21/2020    Vitamin D 25 Hydroxy     Standing Status:   Future     Standing Expiration Date:   5/22/2020    EMG     Order Specific Question:   Which body part? Answer:   BLE     No orders of the defined types were placed in this encounter. Labs reviewed. Elevated WBC - Pt denies h/o. Pt does report family hx of blood disorder. Pt cannot recall name. Pt does donate plasma weekly. Pt will be encouraged to stop for now. Saw onch. Labs reviewed. Plans for repeat labs in 6 mo.      Abn TSH - Asx. T4 nl. TSH 6/11/15 wnl. 9/2015 wnl. Low 8/2017. Pt is sx. Start levothyroxine. Recheck labs 6 mo.      HLD - D/w pt. Diet and exercise encouraged. Will start med. Repeat 6 mo.      GERD - PPI rx.      Allergies - States cannot take antihistamines.       Asthma - No sx. Not on inhalers.      Murmur - H/o. No echo since 2008. Normal ECHO 6/3/15.      Pap - May 2014 wnl per pt. Seeing health department. H/o LEEP. Cervix erythematous today. Erythematous tissue seen in OS. Pt to f/u with GYN. PAP wnl 5/14/15      Peripheral edema - Elevation. Compression stockings. Non pitting      Preop - Dental extraction. EKG here reviewed. Echo from 2015 reviewed. Labs and cxr ordered. D/w pt that she is ok for surgery. She is wanting to see cardiology b/c she wants to get established. Pt states h/o valve disease. Echo shows no significant valve disease.      Cephalgia - H/o HA since teens. L orbital/frontal. N/V. HA daily. Start elavil. Photosensitivity/aura. Neurointact. Pt denies pregnancy. Start elavil. Doing better. Still having HA. Increase dose. MRI neg. Was on Relief with fioricet. Seen in ED 8/2017. Still with HA. Start steroid burst.  Ketoralac today. Also on topamax 100mg BID through neuro. Seeing 2 neurologists through [facility]. Now off of topomax. On elavil. Taking imitrex.        Fatigue - Labs ordered. Pt states worse with heat. No depression, wt loss, night sweats or chills. Pt denies preg. Inflammatory markers elevated. Referral to rheum.      Vitamin D def - Replace and recheck 12 wk.      Rectal bleeding - Recurrent. Small quant. Pt is having abd pain. Neg CT recently. Referral to GI. Colonoscopy and endoscopy - 2018. Balanced diet and routine exercise encouraged.      Multivitamin with vitamin D daily encouraged.      Good sleep hygiene encouraged.      Dental exam q 6 mo.      Vision yearly.       Sunscreen encouraged.      Immunizations reviewed. Flu - 8/21/17    Prevnar 13 - 8/17/16    Pneumovax 23 - 8/21/17    Tdap - Pt states cannot take    Vision exam - Spring 2017     Patient given educational materials - see patient instructions. Discussed use, benefit, and side effects of prescribed medications. All patient questions answered. Pt voiced understanding. Reviewed health maintenance. Instructed to continue current medications, diet and exercise. Patient agreed with treatment plan. Follow up as directed.      Electronically signed by Jhonathan Oro PA-C on 5/22/2019 at 1:46 PM

## 2019-06-18 ENCOUNTER — OFFICE VISIT (OUTPATIENT)
Dept: FAMILY MEDICINE CLINIC | Age: 39
End: 2019-06-18
Payer: COMMERCIAL

## 2019-06-18 VITALS
WEIGHT: 178 LBS | OXYGEN SATURATION: 99 % | SYSTOLIC BLOOD PRESSURE: 124 MMHG | HEIGHT: 62 IN | DIASTOLIC BLOOD PRESSURE: 78 MMHG | BODY MASS INDEX: 32.76 KG/M2 | HEART RATE: 83 BPM | TEMPERATURE: 98 F | RESPIRATION RATE: 16 BRPM

## 2019-06-18 DIAGNOSIS — F41.9 ANXIETY: Primary | ICD-10-CM

## 2019-06-18 PROCEDURE — 99213 OFFICE O/P EST LOW 20 MIN: CPT | Performed by: PHYSICIAN ASSISTANT

## 2019-06-18 PROCEDURE — G8427 DOCREV CUR MEDS BY ELIG CLIN: HCPCS | Performed by: PHYSICIAN ASSISTANT

## 2019-06-18 PROCEDURE — G8417 CALC BMI ABV UP PARAM F/U: HCPCS | Performed by: PHYSICIAN ASSISTANT

## 2019-06-18 PROCEDURE — 1036F TOBACCO NON-USER: CPT | Performed by: PHYSICIAN ASSISTANT

## 2019-06-18 ASSESSMENT — ENCOUNTER SYMPTOMS
CHOKING: 0
ABDOMINAL PAIN: 0
FACIAL SWEATING: 0
NAUSEA: 0
SORE THROAT: 0
EYE ITCHING: 0
BELCHING: 0
HEARTBURN: 0
BACK PAIN: 0
RHINORRHEA: 0
WATER BRASH: 0
GLOBUS SENSATION: 0
SINUS PRESSURE: 0
WHEEZING: 0
STRIDOR: 0
CHEST TIGHTNESS: 0
HOARSE VOICE: 0
COUGH: 0
SHORTNESS OF BREATH: 0
EYE DISCHARGE: 0

## 2019-06-18 NOTE — PROGRESS NOTES
[id number]  [address] [place] [id number]  Dept: [phone]  Dept Fax: [phone]    Vanessa Carlin is a 41 y.o. female who presents today for her medical conditions/complaints as noted below. Vanessa Carlin is c/o of   Chief Complaint   Patient presents with    Anxiety     Visit Information    Have you changed or started any medications since your last visit including any over-the-counter medicines, vitamins, or herbal medicines? no   Have you stopped taking any of your medications? Is so, why? -  no  Are you having any side effects from any of your medications? - no    Have you seen any other physician or provider since your last visit?  no   Have you had any other diagnostic tests since your last visit?  no   Have you been seen in the emergency room and/or had an admission in a hospital since we last saw you?  no   Have you had your routine dental cleaning in the past 6 months?  yes -      Do you have an active MyChart account? If no, what is the barrier? No:     Patient Care Team:  Lylia Gilford, PA-C as PCP - General (Physician Assistant)  Lylia Gilford, PA-C as PCP - [facility]  Leonor Gamez MD as Consulting Physician (Hematology and Oncology)  Willie Guerrero DO as Obstetrician (Obstetrics & Gynecology)    Medical History Review  Past Medical, Family, and Social History reviewed and does contribute to the patient presenting condition    Health Maintenance   Topic Date Due    Varicella Vaccine (1 of 2 - 13+ 2-dose series) 11/14/1995    TSH testing  08/31/2019    Cervical cancer screen  11/13/2021    Flu vaccine  Completed    Pneumococcal 0-64 years Vaccine  Completed    HIV screen  Completed                 HPI:     Anxiety - resolved since quitting job    Headache    This is a chronic problem. The pain quality is similar to prior headaches.  Pertinent negatives include no abdominal pain, abnormal behavior, anorexia, back pain, coughing, dizziness, ear pain, facial sweating, fever, hearing loss, insomnia, nausea, neck pain, numbness, rhinorrhea, sinus pressure, sore throat, tingling, tinnitus, weakness or weight loss. There is no history of obesity. Hyperlipidemia   This is a chronic problem. The current episode started more than 1 year ago. She has no history of chronic renal disease, diabetes, hypothyroidism, liver disease or obesity. Pertinent negatives include no chest pain, focal sensory loss, focal weakness, leg pain, myalgias or shortness of breath. The current treatment provides moderate improvement of lipids. Risk factors for coronary artery disease include obesity and dyslipidemia. Gastroesophageal Reflux   She reports no abdominal pain, no belching, no chest pain, no choking, no coughing, no dysphagia, no early satiety, no globus sensation, no heartburn, no hoarse voice, no nausea, no sore throat, no stridor, no tooth decay, no water brash or no wheezing. This is a chronic problem. Associated symptoms include fatigue. Pertinent negatives include no anemia, melena, muscle weakness, orthopnea or weight loss. Past procedures do not include an abdominal ultrasound or an EGD. Past invasive treatments do not include gastroplasty.        Hemoglobin A1C (%)   Date Value   02/12/2018 5.0             ( goal A1C is < 7)   No results found for: LABMICR  LDL Cholesterol (mg/dL)   Date Value   08/31/2018 58   08/14/2017 76   08/18/2016 75       (goal LDL is <100)   AST (U/L)   Date Value   08/31/2018 15     ALT (U/L)   Date Value   08/31/2018 10     BUN (mg/dL)   Date Value   08/31/2018 9     BP Readings from Last 3 Encounters:   06/18/19 124/78   05/22/19 123/83   04/30/19 108/70          (goal 120/80)    Past Medical History:   Diagnosis Date    Allergic rhinitis     Anesthesia     HISTORY OF LONG EMERGENCE    Aortic stenosis     SEES DR Mk Lenz AS NEEDED    Arthritis     Asthma     Dental crown present [place] UPPER FRONT    GERD (gastroesophageal reflux disease)     Heart murmur     Hyperlipidemia 9/15/2015    Hypothyroidism 9/10/2018    Low blood sugar     Numbness and tingling     HANDS AND FEET/SEES DR. Lavelle ramirez ([facility]     Psoriasis     Pulmonary stenosis     Ringing in ears     SEES AN ENT    Thyroid disease     Wears glasses       Past Surgical History:   Procedure Laterality Date    CHOLECYSTECTOMY, LAPAROSCOPIC  04/27/2018    COLONOSCOPY  01/08/2018    COLON, RANDOM BIOPSIES: MILD FOCAL ACTIVE COLITIS, HEMORRHOIDS    ENDOSCOPY, COLON, DIAGNOSTIC  01/08/2018    EGD and Colonoscopy    LEEP  2008    [place] COLONOSCOPY W/BIOPSY SINGLE/MULTIPLE N/A 1/8/2018    COLONOSCOPY WITH BIOPSY performed by Denver Plummer MD at [address] EGD TRANSORAL BIOPSY SINGLE/MULTIPLE N/A 1/8/2018    EGD BIOPSY performed by Denver Plummer MD at [facility] N/A 4/27/2018    [address] performed by Suzanne Pfeiffer MD at [address]  01/08/2018     grade 2 esophagitis    WISDOM TOOTH EXTRACTION Right 1/8/16    WISDOM TOOTH EXTRACTION         Family History   Problem Relation Age of Onset    Diabetes Father     Other Mother         blood disorder    Other Maternal Aunt         blood disorder    Cancer Maternal Aunt         breast    Other Paternal Aunt         chiari malformation type 2     Cancer Paternal Aunt         ovarian cancer    Cancer Maternal Grandmother     Cancer Maternal Grandfather     Cancer Paternal Grandmother     High Blood Pressure Paternal Grandfather     Heart Disease Paternal Grandfather     Other Maternal Aunt         brain aneurysm       Social History     Tobacco Use    Smoking status: Never Smoker    Smokeless tobacco: Never Used   Substance Use Topics    Alcohol use:  Yes     Alcohol/week: 0.0 oz     Comment: social Current Outpatient Medications   Medication Sig Dispense Refill    lidocaine (LIDODERM) 5 % Place 1-2 patches onto the skin daily 12 hours on, 12 hours off. 15 patch 0    orphenadrine (NORFLEX) 100 MG extended release tablet Take 1 tablet by mouth 2 times daily 14 tablet 0    CRYSELLE-28 0.3-30 MG-MCG per tablet take 1 tablet by mouth once daily 28 tablet 11    omeprazole (PRILOSEC) 20 MG delayed release capsule take 1 capsule by mouth once daily 30 capsule 3    RA VITAMIN D-3 2000 units CAPS take 1 capsule by mouth once daily 90 capsule 1    atorvastatin (LIPITOR) 20 MG tablet take 1 tablet by mouth once daily 90 tablet 1    SUMAtriptan (IMITREX) 6 MG/0.5ML SOLN injection Inject 6 mg into the skin as needed (migraine) No more than 2 doses in a 24 hr period 1 mL 3    ibuprofen (ADVIL;MOTRIN) 800 MG tablet ibuprofen 800 mg tablet   Take 1 tablet as needed by oral route for 20 days.  albuterol sulfate HFA (VENTOLIN HFA) 108 (90 Base) MCG/ACT inhaler Ventolin HFA 90 mcg/actuation aerosol inhaler      fluticasone (FLONASE) 50 MCG/ACT nasal spray 2 sprays by Nasal route      diphenhydrAMINE (BENADRYL) 25 MG tablet Take 25 mg by mouth every 6 hours as needed for Itching       No current facility-administered medications for this visit.       Allergies   Allergen Reactions    Food Hives     Oysters, clams, mussels, scallops    Bactrim [Sulfamethoxazole-Trimethoprim] Hives    Bee Venom Swelling    Benzocaine     Decadron [Dexamethasone]      Hives and hot flashes      Seasonal     Sulfamethoxazole-Trimethoprim Hives    Tetanus Toxoid, Adsorbed     Tetanus Toxoids        Health Maintenance   Topic Date Due    Varicella Vaccine (1 of 2 - 13+ 2-dose series) 11/14/1995    TSH testing  08/31/2019    Cervical cancer screen  11/13/2021    Flu vaccine  Completed    Pneumococcal 0-64 years Vaccine  Completed    HIV screen  Completed       Subjective:      Review of Systems   Constitutional: Positive for fatigue. Negative for chills, diaphoresis, fever and weight loss. HENT: Negative for congestion, ear discharge, ear pain, hearing loss, hoarse voice, postnasal drip, rhinorrhea, sinus pressure, sore throat and tinnitus. Eyes: Negative for discharge and itching. Respiratory: Negative for cough, choking, chest tightness, shortness of breath and wheezing. Cardiovascular: Negative for chest pain, palpitations and leg swelling. Gastrointestinal: Negative for abdominal pain, anorexia, dysphagia, heartburn, melena and nausea. Musculoskeletal: Negative for back pain, myalgias, muscle weakness, neck pain and neck stiffness. Skin: Negative for rash. Neurological: Positive for headaches. Negative for dizziness, tingling, focal weakness, weakness, light-headedness and numbness. Psychiatric/Behavioral: The patient does not have insomnia. All other systems reviewed and are negative. Objective:     Physical Exam   Constitutional: She is oriented to person, place, and time. She appears well-developed and well-nourished. No distress. /72  Pulse 90  Temp 98.6 °F (37 °C) (Oral)   Ht 5' 2\" (1.575 m)  Wt 196 lb 12.8 oz (89.3 kg)  LMP 04/07/2017 (Exact Date)  SpO2 96%  BMI 36 kg/m2     HENT:   Head: Normocephalic and atraumatic. Right Ear: External ear normal.   Left Ear: External ear normal.   Nose: Nose normal.   Mouth/Throat: Oropharynx is clear and moist.   Eyes: Pupils are equal, round, and reactive to light. Conjunctivae and EOM are normal. Right eye exhibits no discharge. Left eye exhibits no discharge. No scleral icterus. Neck: Normal range of motion. Neck supple. No tracheal deviation present. No thyromegaly present. Cardiovascular: Normal rate, regular rhythm and normal heart sounds. Exam reveals no gallop and no friction rub. No murmur heard. Pulmonary/Chest: Effort normal and breath sounds normal. No stridor. No respiratory distress. She has no wheezes. She has no rales. She exhibits no tenderness. Abdominal: Soft. Bowel sounds are normal. She exhibits no distension. There is no tenderness. There is no rebound and no guarding. Musculoskeletal: She exhibits no edema. Neurological: She is alert and oriented to person, place, and time. No cranial nerve deficit. Coordination and gait normal.   Skin: Skin is warm and dry. No rash noted. She is not diaphoretic. Psychiatric: She has a normal mood and affect. Her affect is not inappropriate. Nursing note and vitals reviewed. /78   Pulse 83   Temp 98 °F (36.7 °C)   Resp 16   Ht 5' 2.4\" (1.585 m)   Wt 178 lb (80.7 kg)   LMP 05/02/2019 (Exact Date)   SpO2 99%   BMI 32.14 kg/m²     Assessment:       Diagnosis Orders   1. Anxiety               Plan:      No follow-ups on file. No orders of the defined types were placed in this encounter. No orders of the defined types were placed in this encounter. Labs reviewed. Anxiety - Hated job. Has since quit. Anxiety resolved. Does not want counseling or meds. Elevated WBC - Pt denies h/o. Pt does report family hx of blood disorder. Pt cannot recall name. Pt does donate plasma weekly. Pt will be encouraged to stop for now. Saw onch. Labs reviewed. Plans for repeat labs in 6 mo.      Abn TSH - Asx. T4 nl. TSH 6/11/15 wnl. 9/2015 wnl. Low 8/2017. Pt is sx. Start levothyroxine. Recheck labs 6 mo.      HLD - D/w pt. Diet and exercise encouraged. Will start med. Repeat 6 mo.      GERD - PPI rx.      Allergies - States cannot take antihistamines.       Asthma - No sx. Not on inhalers.      Murmur - H/o. No echo since 2008. Normal ECHO 6/3/15.      Pap - May 2014 wnl per pt. Seeing health department. H/o LEEP. Cervix erythematous today. Erythematous tissue seen in OS. Pt to f/u with GYN. PAP wnl 5/14/15      Peripheral edema - Elevation. Compression stockings. Non pitting      Preop - Dental extraction. EKG here reviewed. Echo from 2015 reviewed.  Labs and cxr

## 2019-06-19 ENCOUNTER — HOSPITAL ENCOUNTER (OUTPATIENT)
Age: 39
Setting detail: SPECIMEN
Discharge: HOME | End: 2019-06-19
Payer: COMMERCIAL

## 2019-06-19 DIAGNOSIS — R29.898 WEAKNESS OF BOTH LOWER EXTREMITIES: ICD-10-CM

## 2019-06-19 DIAGNOSIS — E55.9 VITAMIN D DEFICIENCY: ICD-10-CM

## 2019-06-19 DIAGNOSIS — R79.89 ABNORMAL TSH: ICD-10-CM

## 2019-06-19 DIAGNOSIS — J45.909 UNCOMPLICATED ASTHMA, UNSPECIFIED ASTHMA SEVERITY, UNSPECIFIED WHETHER PERSISTENT: Chronic | ICD-10-CM

## 2019-06-19 DIAGNOSIS — E78.5 HYPERLIPIDEMIA, UNSPECIFIED HYPERLIPIDEMIA TYPE: Chronic | ICD-10-CM

## 2019-06-19 DIAGNOSIS — R51.9 ACUTE NONINTRACTABLE HEADACHE, UNSPECIFIED HEADACHE TYPE: ICD-10-CM

## 2019-06-19 LAB
ABSOLUTE EOS #: 0.07 K/UL (ref 0–0.44)
ABSOLUTE IMMATURE GRANULOCYTE: 0.04 K/UL (ref 0–0.3)
ABSOLUTE LYMPH #: 3.89 K/UL (ref 1.1–3.7)
ABSOLUTE MONO #: 0.76 K/UL (ref 0.1–1.2)
ALBUMIN SERPL-MCNC: 3.8 G/DL (ref 3.5–5.2)
ALBUMIN/GLOBULIN RATIO: 1.1 (ref 1–2.5)
ALP BLD-CCNC: 61 U/L (ref 35–104)
ALT SERPL-CCNC: 8 U/L (ref 5–33)
ANION GAP SERPL CALCULATED.3IONS-SCNC: 16 MMOL/L (ref 9–17)
AST SERPL-CCNC: 14 U/L
BASOPHILS # BLD: 0 % (ref 0–2)
BASOPHILS ABSOLUTE: 0.04 K/UL (ref 0–0.2)
BILIRUB SERPL-MCNC: 1.04 MG/DL (ref 0.3–1.2)
BUN BLDV-MCNC: 8 MG/DL (ref 6–20)
BUN/CREAT BLD: ABNORMAL (ref 9–20)
CALCIUM SERPL-MCNC: 9.1 MG/DL (ref 8.6–10.4)
CHLORIDE BLD-SCNC: 105 MMOL/L (ref 98–107)
CHOLESTEROL/HDL RATIO: 2.9
CHOLESTEROL: 141 MG/DL
CO2: 22 MMOL/L (ref 20–31)
CREAT SERPL-MCNC: 0.49 MG/DL (ref 0.5–0.9)
DIFFERENTIAL TYPE: ABNORMAL
EOSINOPHILS RELATIVE PERCENT: 1 % (ref 1–4)
GFR AFRICAN AMERICAN: >60 ML/MIN
GFR NON-AFRICAN AMERICAN: >60 ML/MIN
GFR SERPL CREATININE-BSD FRML MDRD: ABNORMAL ML/MIN/{1.73_M2}
GFR SERPL CREATININE-BSD FRML MDRD: ABNORMAL ML/MIN/{1.73_M2}
GLUCOSE BLD-MCNC: 63 MG/DL (ref 70–99)
HCT VFR BLD CALC: 46.7 % (ref 36.3–47.1)
HDLC SERPL-MCNC: 48 MG/DL
HEMOGLOBIN: 14.1 G/DL (ref 11.9–15.1)
IMMATURE GRANULOCYTES: 0 %
LDL CHOLESTEROL: 68 MG/DL (ref 0–130)
LYMPHOCYTES # BLD: 31 % (ref 24–43)
MCH RBC QN AUTO: 28.1 PG (ref 25.2–33.5)
MCHC RBC AUTO-ENTMCNC: 30.2 G/DL (ref 28.4–34.8)
MCV RBC AUTO: 93.2 FL (ref 82.6–102.9)
MONOCYTES # BLD: 6 % (ref 3–12)
NRBC AUTOMATED: 0 PER 100 WBC
PDW BLD-RTO: 13.7 % (ref 11.8–14.4)
PLATELET # BLD: 388 K/UL (ref 138–453)
PLATELET ESTIMATE: ABNORMAL
PMV BLD AUTO: 9.4 FL (ref 8.1–13.5)
POTASSIUM SERPL-SCNC: 4.2 MMOL/L (ref 3.7–5.3)
RBC # BLD: 5.01 M/UL (ref 3.95–5.11)
RBC # BLD: ABNORMAL 10*6/UL
SEG NEUTROPHILS: 62 % (ref 36–65)
SEGMENTED NEUTROPHILS ABSOLUTE COUNT: 7.97 K/UL (ref 1.5–8.1)
SODIUM BLD-SCNC: 143 MMOL/L (ref 135–144)
THYROXINE, FREE: 1.19 NG/DL (ref 0.93–1.7)
TOTAL PROTEIN: 7.4 G/DL (ref 6.4–8.3)
TRIGL SERPL-MCNC: 123 MG/DL
TSH SERPL DL<=0.05 MIU/L-ACNC: 4.26 MIU/L (ref 0.3–5)
VITAMIN D 25-HYDROXY: 39.6 NG/ML (ref 30–100)
VLDLC SERPL CALC-MCNC: NORMAL MG/DL (ref 1–30)
WBC # BLD: 12.8 K/UL (ref 3.5–11.3)
WBC # BLD: ABNORMAL 10*3/UL

## 2019-06-19 PROCEDURE — 82306 VITAMIN D 25 HYDROXY: CPT

## 2019-06-19 PROCEDURE — 36415 COLL VENOUS BLD VENIPUNCTURE: CPT

## 2019-06-19 PROCEDURE — 85025 COMPLETE CBC W/AUTO DIFF WBC: CPT

## 2019-06-19 PROCEDURE — 80061 LIPID PANEL: CPT

## 2019-06-19 PROCEDURE — 84439 ASSAY OF FREE THYROXINE: CPT

## 2019-06-19 PROCEDURE — 84443 ASSAY THYROID STIM HORMONE: CPT

## 2019-06-19 PROCEDURE — 80053 COMPREHEN METABOLIC PANEL: CPT

## 2019-07-12 RX ORDER — ATORVASTATIN CALCIUM 20 MG/1
TABLET, FILM COATED ORAL
Qty: 90 TABLET | Refills: 1 | OUTPATIENT
Start: 2019-07-12

## 2019-07-15 RX ORDER — ATORVASTATIN CALCIUM 20 MG/1
TABLET, FILM COATED ORAL
Qty: 90 TABLET | Refills: 1 | Status: SHIPPED | OUTPATIENT
Start: 2019-07-15 | End: 2020-01-09 | Stop reason: SDUPTHER

## 2019-07-16 ENCOUNTER — OFFICE VISIT (OUTPATIENT)
Dept: NEUROLOGY | Age: 39
End: 2019-07-16
Payer: COMMERCIAL

## 2019-07-16 VITALS
DIASTOLIC BLOOD PRESSURE: 75 MMHG | HEART RATE: 70 BPM | WEIGHT: 176.4 LBS | HEIGHT: 62 IN | SYSTOLIC BLOOD PRESSURE: 119 MMHG | BODY MASS INDEX: 32.46 KG/M2

## 2019-07-16 DIAGNOSIS — G43.711 INTRACTABLE CHRONIC MIGRAINE WITHOUT AURA WITH STATUS MIGRAINOSUS: Primary | ICD-10-CM

## 2019-07-16 PROCEDURE — 99214 OFFICE O/P EST MOD 30 MIN: CPT | Performed by: PSYCHIATRY & NEUROLOGY

## 2019-07-16 PROCEDURE — G8417 CALC BMI ABV UP PARAM F/U: HCPCS | Performed by: PSYCHIATRY & NEUROLOGY

## 2019-07-16 PROCEDURE — 1036F TOBACCO NON-USER: CPT | Performed by: PSYCHIATRY & NEUROLOGY

## 2019-07-16 PROCEDURE — G8427 DOCREV CUR MEDS BY ELIG CLIN: HCPCS | Performed by: PSYCHIATRY & NEUROLOGY

## 2019-07-16 NOTE — PROGRESS NOTES
standard drinks     Comment: social     Drug use: Yes     Types: Marijuana     Comment: edible cannabis for pain    Sexual activity: Yes     Partners: Male     Birth control/protection: Pill   Lifestyle    Physical activity:     Days per week: Not on file     Minutes per session: Not on file    Stress: Not on file   Relationships    Social connections:     Talks on phone: Not on file     Gets together: Not on file     Attends Church service: Not on file     Active member of club or organization: Not on file     Attends meetings of clubs or organizations: Not on file     Relationship status: Not on file    Intimate partner violence:     Fear of current or ex partner: Not on file     Emotionally abused: Not on file     Physically abused: Not on file     Forced sexual activity: Not on file   Other Topics Concern    Not on file   Social History Narrative    Not on file       CURRENT MEDICATIONS:     Current Outpatient Medications   Medication Sig Dispense Refill    Tetrahydrozoline-Zn Sulfate (EYE DROPS ALLERGY RELIEF OP) Apply 2 drops to eye 2 times daily as needed      Erenumab-aooe (AIMOVIG) 140 MG/ML SOAJ Inject 140 mg into the skin every 30 days 2 pen 3    omeprazole (PRILOSEC) 20 MG delayed release capsule take 1 capsule by mouth once daily (Patient taking differently: Take 20 mg by mouth daily as needed ) 30 capsule 3    atorvastatin (LIPITOR) 20 MG tablet take 1 tablet by mouth once daily 90 tablet 1    lidocaine (LIDODERM) 5 % Place 1-2 patches onto the skin daily 12 hours on, 12 hours off. 15 patch 0    orphenadrine (NORFLEX) 100 MG extended release tablet Take 1 tablet by mouth 2 times daily 14 tablet 0    CRYSELLE-28 0.3-30 MG-MCG per tablet take 1 tablet by mouth once daily 28 tablet 11    RA VITAMIN D-3 2000 units CAPS take 1 capsule by mouth once daily 90 capsule 1    SUMAtriptan (IMITREX) 6 MG/0.5ML SOLN injection Inject 6 mg into the skin as needed (migraine) No more than 2 doses in

## 2019-07-17 ENCOUNTER — HOSPITAL ENCOUNTER (OUTPATIENT)
Age: 39
Setting detail: SPECIMEN
Discharge: HOME | End: 2019-07-17
Payer: COMMERCIAL

## 2019-07-17 DIAGNOSIS — D72.829 LEUKOCYTOSIS, UNSPECIFIED TYPE: ICD-10-CM

## 2019-07-17 LAB
ABSOLUTE EOS #: 0.06 K/UL (ref 0–0.44)
ABSOLUTE IMMATURE GRANULOCYTE: 0.03 K/UL (ref 0–0.3)
ABSOLUTE LYMPH #: 3.07 K/UL (ref 1.1–3.7)
ABSOLUTE MONO #: 0.74 K/UL (ref 0.1–1.2)
BASOPHILS # BLD: 0 % (ref 0–2)
BASOPHILS ABSOLUTE: 0.04 K/UL (ref 0–0.2)
DIFFERENTIAL TYPE: NORMAL
EOSINOPHILS RELATIVE PERCENT: 1 % (ref 1–4)
HCT VFR BLD CALC: 44.8 % (ref 36.3–47.1)
HEMOGLOBIN: 14.4 G/DL (ref 11.9–15.1)
IMMATURE GRANULOCYTES: 0 %
LYMPHOCYTES # BLD: 29 % (ref 24–43)
MCH RBC QN AUTO: 29.3 PG (ref 25.2–33.5)
MCHC RBC AUTO-ENTMCNC: 32.1 G/DL (ref 28.4–34.8)
MCV RBC AUTO: 91.2 FL (ref 82.6–102.9)
MONOCYTES # BLD: 7 % (ref 3–12)
NRBC AUTOMATED: 0 PER 100 WBC
PDW BLD-RTO: 13.5 % (ref 11.8–14.4)
PLATELET # BLD: 374 K/UL (ref 138–453)
PLATELET ESTIMATE: NORMAL
PMV BLD AUTO: 9.7 FL (ref 8.1–13.5)
RBC # BLD: 4.91 M/UL (ref 3.95–5.11)
RBC # BLD: NORMAL 10*6/UL
SEG NEUTROPHILS: 63 % (ref 36–65)
SEGMENTED NEUTROPHILS ABSOLUTE COUNT: 6.61 K/UL (ref 1.5–8.1)
WBC # BLD: 10.6 K/UL (ref 3.5–11.3)
WBC # BLD: NORMAL 10*3/UL

## 2019-07-17 PROCEDURE — 85025 COMPLETE CBC W/AUTO DIFF WBC: CPT

## 2019-07-17 PROCEDURE — 36415 COLL VENOUS BLD VENIPUNCTURE: CPT

## 2019-07-25 ENCOUNTER — HOSPITAL ENCOUNTER (OUTPATIENT)
Facility: MEDICAL CENTER | Age: 39
End: 2019-07-25
Payer: COMMERCIAL

## 2019-07-31 ENCOUNTER — OFFICE VISIT (OUTPATIENT)
Dept: ONCOLOGY | Age: 39
End: 2019-07-31
Payer: COMMERCIAL

## 2019-07-31 ENCOUNTER — TELEPHONE (OUTPATIENT)
Dept: ONCOLOGY | Age: 39
End: 2019-07-31

## 2019-07-31 VITALS
WEIGHT: 177.1 LBS | HEART RATE: 67 BPM | SYSTOLIC BLOOD PRESSURE: 119 MMHG | TEMPERATURE: 98.3 F | BODY MASS INDEX: 32.39 KG/M2 | DIASTOLIC BLOOD PRESSURE: 84 MMHG

## 2019-07-31 DIAGNOSIS — D72.829 LEUKOCYTOSIS, UNSPECIFIED TYPE: Primary | ICD-10-CM

## 2019-07-31 PROCEDURE — 99214 OFFICE O/P EST MOD 30 MIN: CPT | Performed by: INTERNAL MEDICINE

## 2019-07-31 PROCEDURE — G8417 CALC BMI ABV UP PARAM F/U: HCPCS | Performed by: INTERNAL MEDICINE

## 2019-07-31 PROCEDURE — G8427 DOCREV CUR MEDS BY ELIG CLIN: HCPCS | Performed by: INTERNAL MEDICINE

## 2019-07-31 PROCEDURE — 1036F TOBACCO NON-USER: CPT | Performed by: INTERNAL MEDICINE

## 2019-07-31 PROCEDURE — 99211 OFF/OP EST MAY X REQ PHY/QHP: CPT | Performed by: INTERNAL MEDICINE

## 2019-08-14 DIAGNOSIS — Z12.4 SCREENING FOR CERVICAL CANCER: Primary | ICD-10-CM

## 2019-08-19 ENCOUNTER — TELEPHONE (OUTPATIENT)
Dept: NEUROLOGY | Age: 39
End: 2019-08-19

## 2019-08-19 RX ORDER — GLUCOSAMINE/CHONDR SU A SOD 750-600 MG
TABLET ORAL
Qty: 90 CAPSULE | Refills: 1 | Status: SHIPPED | OUTPATIENT
Start: 2019-08-19 | End: 2020-02-05 | Stop reason: SDUPTHER

## 2019-08-22 ENCOUNTER — HOSPITAL ENCOUNTER (EMERGENCY)
Age: 39
Discharge: HOME | End: 2019-08-22
Attending: EMERGENCY MEDICINE
Payer: COMMERCIAL

## 2019-08-22 VITALS
SYSTOLIC BLOOD PRESSURE: 123 MMHG | HEART RATE: 77 BPM | BODY MASS INDEX: 32.2 KG/M2 | WEIGHT: 175 LBS | HEIGHT: 62 IN | TEMPERATURE: 98.4 F | RESPIRATION RATE: 16 BRPM | OXYGEN SATURATION: 98 % | DIASTOLIC BLOOD PRESSURE: 74 MMHG

## 2019-08-22 DIAGNOSIS — R51.9 NONINTRACTABLE EPISODIC HEADACHE, UNSPECIFIED HEADACHE TYPE: Primary | ICD-10-CM

## 2019-08-22 PROCEDURE — 96372 THER/PROPH/DIAG INJ SC/IM: CPT

## 2019-08-22 PROCEDURE — 6370000000 HC RX 637 (ALT 250 FOR IP): Performed by: EMERGENCY MEDICINE

## 2019-08-22 PROCEDURE — 6360000002 HC RX W HCPCS: Performed by: EMERGENCY MEDICINE

## 2019-08-22 PROCEDURE — 99283 EMERGENCY DEPT VISIT LOW MDM: CPT

## 2019-08-22 RX ORDER — KETOROLAC TROMETHAMINE 30 MG/ML
30 INJECTION, SOLUTION INTRAMUSCULAR; INTRAVENOUS ONCE
Status: COMPLETED | OUTPATIENT
Start: 2019-08-22 | End: 2019-08-22

## 2019-08-22 RX ORDER — ONDANSETRON 4 MG/1
4 TABLET, ORALLY DISINTEGRATING ORAL ONCE
Status: COMPLETED | OUTPATIENT
Start: 2019-08-22 | End: 2019-08-22

## 2019-08-22 RX ORDER — ONDANSETRON 2 MG/ML
4 INJECTION INTRAMUSCULAR; INTRAVENOUS ONCE
Status: DISCONTINUED | OUTPATIENT
Start: 2019-08-22 | End: 2019-08-22

## 2019-08-22 RX ADMIN — ONDANSETRON 4 MG: 4 TABLET, ORALLY DISINTEGRATING ORAL at 11:13

## 2019-08-22 RX ADMIN — KETOROLAC TROMETHAMINE 30 MG: 30 INJECTION, SOLUTION INTRAMUSCULAR at 11:14

## 2019-08-22 ASSESSMENT — PAIN DESCRIPTION - LOCATION: LOCATION: HEAD

## 2019-08-22 ASSESSMENT — PAIN SCALES - GENERAL
PAINLEVEL_OUTOF10: 9
PAINLEVEL_OUTOF10: 9

## 2019-08-22 ASSESSMENT — PAIN DESCRIPTION - PAIN TYPE: TYPE: ACUTE PAIN

## 2019-08-22 ASSESSMENT — PAIN DESCRIPTION - DESCRIPTORS: DESCRIPTORS: THROBBING;RADIATING

## 2019-08-22 NOTE — ED PROVIDER NOTES
Ringing in ears, Sacroiliac joint dysfunction of both sides, Sacroiliitis ([address] Thyroid disease, and Wears glasses. SURGICAL HISTORY      has a past surgical history that includes LEEP (2008); Snow tooth extraction (Right, 1/8/16); Snow tooth extraction; Endoscopy, colon, diagnostic (01/08/2018); Upper gastrointestinal endoscopy (01/08/2018); Colonoscopy (01/08/2018); pr egd transoral biopsy single/multiple (N/A, 1/8/2018); pr colonoscopy w/biopsy single/multiple (N/A, 1/8/2018); Cholecystectomy, laparoscopic (04/27/2018); pr lap,cholecystectomy (N/A, 4/27/2018); Nerve Block (06/2019); and Nerve Block (07/2019). CURRENT MEDICATIONS       Previous Medications    ALBUTEROL SULFATE HFA (VENTOLIN HFA) 108 (90 BASE) MCG/ACT INHALER    Ventolin HFA 90 mcg/actuation aerosol inhaler    ATORVASTATIN (LIPITOR) 20 MG TABLET    take 1 tablet by mouth once daily    CRYSELLE-28 0.3-30 MG-MCG PER TABLET    take 1 tablet by mouth once daily    DIPHENHYDRAMINE (BENADRYL) 25 MG TABLET    Take 25 mg by mouth every 6 hours as needed for Itching    ERENUMAB-AOOE (AIMOVIG) 140 MG/ML SOAJ    Inject 140 mg into the skin every 30 days    FLUTICASONE (FLONASE) 50 MCG/ACT NASAL SPRAY    2 sprays by Nasal route    IBUPROFEN (ADVIL;MOTRIN) 800 MG TABLET    ibuprofen 800 mg tablet   Take 1 tablet as needed by oral route for 20 days. LIDOCAINE (LIDODERM) 5 %    Place 1-2 patches onto the skin daily 12 hours on, 12 hours off.     OMEPRAZOLE (PRILOSEC) 20 MG DELAYED RELEASE CAPSULE    take 1 capsule by mouth once daily    RA VITAMIN D-3 2000 UNITS CAPS    take 1 capsule by mouth once daily    SUMATRIPTAN (IMITREX) 6 MG/0.5ML SOLN INJECTION    Inject 6 mg into the skin as needed (migraine) No more than 2 doses in a 24 hr period    TETRAHYDROZOLINE-ZN SULFATE (EYE DROPS ALLERGY RELIEF OP)    Apply 2 drops to eye 2 times daily as needed    VENTOLIN  (90 BASE) MCG/ACT INHALER    INHALE 2 PUFFS BY MOUTH EVERY 6 HOURS IF NEEDED FOR WHEEZING       ALLERGIES     is allergic to decadron [dexamethasone]; food; bactrim [sulfamethoxazole-trimethoprim]; bee venom; benzocaine; cortisone; prednisone; seasonal; sulfamethoxazole-trimethoprim; tetanus toxoid, adsorbed; and tetanus toxoids. FAMILY HISTORY     She indicated that her mother is alive. She indicated that her father is alive. She indicated that her maternal grandmother is . She indicated that her maternal grandfather is . She indicated that her paternal grandmother is . She indicated that her paternal grandfather is . She indicated that only one of her two maternal aunts is alive. She indicated that her paternal aunt is alive. family history includes Cancer in her maternal aunt, maternal grandfather, maternal grandmother, paternal aunt, and paternal grandmother; Diabetes in her father; Heart Disease in her paternal grandfather; High Blood Pressure in her paternal grandfather; Other in her maternal aunt, maternal aunt, mother, and paternal aunt. SOCIAL HISTORY      reports that she has never smoked. She has never used smokeless tobacco. She reports that she drinks alcohol. She reports that she has current or past drug history. Drug: Marijuana. PHYSICAL EXAM     INITIAL VITALS:  height is 5' 2\" (1.575 m) and weight is 79.4 kg (175 lb). Her oral temperature is 98.4 °F (36.9 °C). Her blood pressure is 123/74 and her pulse is 77. Her respiration is 16 and oxygen saturation is 98%. Patient is alert and oriented, in no apparent distress. HEENT is atraumatic. Pupils are PERRL at 5 mm with normal extraocular motion. Mucous membranes moist.    Neck is supple with no lymphadenopathy. No JVD. No meningismus. Heart sounds regular rate and rhythm with no gallops, murmurs, or rubs. Lungs clear, no wheezes, rales or rhonchi. Abdomen: soft, nontender with no pain to palpation. Musculoskeletal exam shows no evidence of trauma.   Normal distal pulses in all extremities. Skin: no rash or edema. Neurological exam reveals cranial nerves 2 through 12 grossly intact. Patient has equal  and normal deep tendon reflexes. Psychiatric: no hallucinations or suicidal ideation. Lymphatics.:  No lymphadenopathy. DIFFERENTIAL DIAGNOSIS/ MDM:     Migraine, SAH, meningitis    DIAGNOSTIC RESULTS         EMERGENCY DEPARTMENT COURSE:   Vitals:    Vitals:    08/22/19 1039   BP: 123/74   Pulse: 77   Resp: 16   Temp: 98.4 °F (36.9 °C)   TempSrc: Oral   SpO2: 98%   Weight: 79.4 kg (175 lb)   Height: 5' 2\" (1.575 m)     -------------------------  BP: 123/74, Temp: 98.4 °F (36.9 °C), Pulse: 77, Resp: 16      Re-evaluation Notes    The patient had improvement with Zofran and Toradol. I they will ask that she follow up with her neurologist and her PCP and continue medications as directed. This appears to be a typical headache for the patient. She is discharged in good condition. FINAL IMPRESSION      1.  Nonintractable episodic headache, unspecified headache type          DISPOSITION/PLAN   DISPOSITION Decision To Discharge 08/22/2019 11:49:47 AM      Condition on Disposition    good    PATIENT REFERRED TO:  James Lam PA-C  [address] [address] [phone]    In 1 week        DISCHARGE MEDICATIONS:  New Prescriptions    No medications on file       (Please note that portions of this note were completed with a voice recognition program.  Efforts were made to edit the dictations but occasionally words are mis-transcribed.)    Nobles MD   Attending Emergency Physician         Chata Terrell MD  08/22/19 0248

## 2019-08-23 ENCOUNTER — CARE COORDINATION (OUTPATIENT)
Dept: CARE COORDINATION | Age: 39
End: 2019-08-23

## 2019-09-04 ENCOUNTER — OFFICE VISIT (OUTPATIENT)
Dept: NEUROLOGY | Age: 39
End: 2019-09-04
Payer: COMMERCIAL

## 2019-09-04 DIAGNOSIS — R20.0 NUMBNESS: Primary | ICD-10-CM

## 2019-09-04 PROCEDURE — 95909 NRV CNDJ TST 5-6 STUDIES: CPT | Performed by: PSYCHIATRY & NEUROLOGY

## 2019-09-04 PROCEDURE — 95886 MUSC TEST DONE W/N TEST COMP: CPT | Performed by: PSYCHIATRY & NEUROLOGY

## 2019-09-17 RX ORDER — OMEPRAZOLE 20 MG/1
CAPSULE, DELAYED RELEASE ORAL
Qty: 30 CAPSULE | Refills: 1 | Status: SHIPPED | OUTPATIENT
Start: 2019-09-17 | End: 2019-11-20 | Stop reason: SDUPTHER

## 2019-09-19 ENCOUNTER — OFFICE VISIT (OUTPATIENT)
Dept: NEUROLOGY | Age: 39
End: 2019-09-19
Payer: COMMERCIAL

## 2019-09-19 VITALS
SYSTOLIC BLOOD PRESSURE: 122 MMHG | DIASTOLIC BLOOD PRESSURE: 85 MMHG | HEART RATE: 73 BPM | HEIGHT: 62 IN | WEIGHT: 176.8 LBS | BODY MASS INDEX: 32.54 KG/M2

## 2019-09-19 DIAGNOSIS — R20.2 PARESTHESIAS: ICD-10-CM

## 2019-09-19 DIAGNOSIS — G43.711 INTRACTABLE CHRONIC MIGRAINE WITHOUT AURA WITH STATUS MIGRAINOSUS: Primary | ICD-10-CM

## 2019-09-19 PROCEDURE — 1036F TOBACCO NON-USER: CPT | Performed by: PSYCHIATRY & NEUROLOGY

## 2019-09-19 PROCEDURE — 99214 OFFICE O/P EST MOD 30 MIN: CPT | Performed by: PSYCHIATRY & NEUROLOGY

## 2019-09-19 PROCEDURE — G8427 DOCREV CUR MEDS BY ELIG CLIN: HCPCS | Performed by: PSYCHIATRY & NEUROLOGY

## 2019-09-19 PROCEDURE — G8417 CALC BMI ABV UP PARAM F/U: HCPCS | Performed by: PSYCHIATRY & NEUROLOGY

## 2019-09-19 RX ORDER — LIDOCAINE 50 MG/G
OINTMENT TOPICAL
Qty: 1 TUBE | Refills: 0 | Status: SHIPPED | OUTPATIENT
Start: 2019-09-19

## 2019-09-19 RX ORDER — TIZANIDINE 4 MG/1
4 TABLET ORAL EVERY 8 HOURS PRN
COMMUNITY

## 2019-09-19 NOTE — PROGRESS NOTES
cyanosis, no edema   Pulses: Symmetric over head and neck   Skin: Skin color, texture normal, no rashes, no lesions                                     NEUROLOGIC EXAMINATION      Mental status    Alert and oriented x 3; intact memory with no confusion, speech or language problems; no hallucinations or delusions  Fund of information appropriate for level of education    Cranial nerves    II - visual fields intact to confrontation , fundoscopy showed no  papiledema                                                III, IV, VI - extra-ocular muscles full: no pupillary defect; no ROBBIE, no nystagmus, no ptosis   V - normal facial sensation                                                               VII - normal facial symmetry                                                             VIII - intact hearing                                                                             IX, X - symmetrical palate                                                                  XI - symmetrical shoulder shrug                                                       XII - tongue midline without atrophy or fasciculation      Motor function  Normal muscle bulk and tone; strength 5/5 on all 4 extremities, no pronator drift      Sensory function Intact to light touch, pinprick, vibration, proprioception on all 4 extremities      Cerebellar Intact fine motor movement. No involuntary movements or tremors. No ataxia or dysmetria on finger to nose or heel to shin testing      Reflex function DTR 2+ on bilateral UE and LE, symmetric. Negative Babinski      Gait                   normal base and arm swing              ASSESSMENT AND PLAN:       This is a 41 y.o. female who comes in for follow up for chronic migraine without aura. She is now doing much better on Aimovig 140 mg SQ monthly, with no side effects. She uses Imitrex SQ for rescue, which also gives her significant relief. We will continue current management.     She also had a

## 2019-10-29 ENCOUNTER — TELEPHONE (OUTPATIENT)
Dept: NEUROLOGY | Age: 39
End: 2019-10-29

## 2019-11-20 ENCOUNTER — OFFICE VISIT (OUTPATIENT)
Dept: FAMILY MEDICINE CLINIC | Age: 39
End: 2019-11-20
Payer: COMMERCIAL

## 2019-11-20 VITALS
OXYGEN SATURATION: 96 % | SYSTOLIC BLOOD PRESSURE: 131 MMHG | DIASTOLIC BLOOD PRESSURE: 86 MMHG | HEIGHT: 62 IN | HEART RATE: 76 BPM | WEIGHT: 180 LBS | TEMPERATURE: 98.2 F | BODY MASS INDEX: 33.13 KG/M2

## 2019-11-20 DIAGNOSIS — D72.829 LEUKOCYTOSIS, UNSPECIFIED TYPE: ICD-10-CM

## 2019-11-20 DIAGNOSIS — R79.89 ABNORMAL TSH: ICD-10-CM

## 2019-11-20 DIAGNOSIS — J45.909 UNCOMPLICATED ASTHMA, UNSPECIFIED ASTHMA SEVERITY, UNSPECIFIED WHETHER PERSISTENT: Chronic | ICD-10-CM

## 2019-11-20 DIAGNOSIS — R51.9 NONINTRACTABLE EPISODIC HEADACHE, UNSPECIFIED HEADACHE TYPE: ICD-10-CM

## 2019-11-20 DIAGNOSIS — E78.5 HYPERLIPIDEMIA, UNSPECIFIED HYPERLIPIDEMIA TYPE: Chronic | ICD-10-CM

## 2019-11-20 DIAGNOSIS — E55.9 VITAMIN D DEFICIENCY: ICD-10-CM

## 2019-11-20 DIAGNOSIS — Z23 NEED FOR INFLUENZA VACCINATION: ICD-10-CM

## 2019-11-20 DIAGNOSIS — E03.9 HYPOTHYROIDISM, UNSPECIFIED TYPE: Primary | ICD-10-CM

## 2019-11-20 PROCEDURE — 1036F TOBACCO NON-USER: CPT | Performed by: PHYSICIAN ASSISTANT

## 2019-11-20 PROCEDURE — G8427 DOCREV CUR MEDS BY ELIG CLIN: HCPCS | Performed by: PHYSICIAN ASSISTANT

## 2019-11-20 PROCEDURE — 99214 OFFICE O/P EST MOD 30 MIN: CPT | Performed by: PHYSICIAN ASSISTANT

## 2019-11-20 PROCEDURE — G8484 FLU IMMUNIZE NO ADMIN: HCPCS | Performed by: PHYSICIAN ASSISTANT

## 2019-11-20 PROCEDURE — G8417 CALC BMI ABV UP PARAM F/U: HCPCS | Performed by: PHYSICIAN ASSISTANT

## 2019-11-20 RX ORDER — OMEPRAZOLE 20 MG/1
20 CAPSULE, DELAYED RELEASE ORAL DAILY
Qty: 90 CAPSULE | Refills: 1 | Status: SHIPPED | OUTPATIENT
Start: 2019-11-20 | End: 2020-05-06 | Stop reason: SDUPTHER

## 2019-11-20 RX ORDER — NICOTINE POLACRILEX 4 MG/1
GUM, CHEWING ORAL
COMMUNITY
Start: 2017-08-30 | End: 2019-11-20

## 2019-11-20 ASSESSMENT — ENCOUNTER SYMPTOMS
COUGH: 0
STRIDOR: 0
RHINORRHEA: 0
EYE DISCHARGE: 0
FACIAL SWEATING: 0
SCALP TENDERNESS: 0
SORE THROAT: 0
BELCHING: 0
CHOKING: 0
EYE ITCHING: 0
BACK PAIN: 0
HOARSE VOICE: 0
GLOBUS SENSATION: 0
ABDOMINAL PAIN: 0
HEARTBURN: 0
NAUSEA: 0
CHEST TIGHTNESS: 0
SINUS PRESSURE: 0

## 2019-11-21 ENCOUNTER — HOSPITAL ENCOUNTER (OUTPATIENT)
Age: 39
Setting detail: SPECIMEN
Discharge: HOME | End: 2019-11-21
Payer: COMMERCIAL

## 2019-11-21 ENCOUNTER — OFFICE VISIT (OUTPATIENT)
Dept: OBGYN CLINIC | Age: 39
End: 2019-11-21
Payer: COMMERCIAL

## 2019-11-21 VITALS
WEIGHT: 180 LBS | BODY MASS INDEX: 33.13 KG/M2 | SYSTOLIC BLOOD PRESSURE: 120 MMHG | HEIGHT: 62 IN | DIASTOLIC BLOOD PRESSURE: 70 MMHG

## 2019-11-21 DIAGNOSIS — Z11.3 SCREEN FOR STD (SEXUALLY TRANSMITTED DISEASE): ICD-10-CM

## 2019-11-21 DIAGNOSIS — Z80.3 FAMILY HISTORY OF BREAST CANCER: ICD-10-CM

## 2019-11-21 DIAGNOSIS — Z12.39 SCREENING FOR BREAST CANCER: ICD-10-CM

## 2019-11-21 DIAGNOSIS — Z01.419 ENCOUNTER FOR ANNUAL ROUTINE GYNECOLOGICAL EXAMINATION: Primary | ICD-10-CM

## 2019-11-21 DIAGNOSIS — N89.8 VAGINAL DISCHARGE: ICD-10-CM

## 2019-11-21 PROCEDURE — 99385 PREV VISIT NEW AGE 18-39: CPT | Performed by: NURSE PRACTITIONER

## 2019-11-21 PROCEDURE — G8484 FLU IMMUNIZE NO ADMIN: HCPCS | Performed by: NURSE PRACTITIONER

## 2019-11-21 ASSESSMENT — ENCOUNTER SYMPTOMS
RHINORRHEA: 0
COLOR CHANGE: 0
SHORTNESS OF BREATH: 0
DIARRHEA: 0
NAUSEA: 0
COUGH: 0
ABDOMINAL PAIN: 0
BACK PAIN: 1
VOMITING: 0
CONSTIPATION: 0

## 2019-11-22 LAB
DIRECT EXAM: ABNORMAL
Lab: ABNORMAL
SPECIMEN DESCRIPTION: ABNORMAL

## 2019-11-23 LAB
CHLAMYDIA BY THIN PREP: NEGATIVE
N. GONORRHOEAE DNA, THIN PREP: NEGATIVE
SPECIMEN DESCRIPTION: NORMAL

## 2019-11-25 ENCOUNTER — TELEPHONE (OUTPATIENT)
Dept: OBGYN CLINIC | Age: 39
End: 2019-11-25

## 2019-11-25 ENCOUNTER — HOSPITAL ENCOUNTER (OUTPATIENT)
Age: 39
Discharge: HOME | End: 2019-11-25
Payer: COMMERCIAL

## 2019-11-25 DIAGNOSIS — B96.89 BV (BACTERIAL VAGINOSIS): Primary | ICD-10-CM

## 2019-11-25 DIAGNOSIS — Z11.3 SCREEN FOR STD (SEXUALLY TRANSMITTED DISEASE): ICD-10-CM

## 2019-11-25 DIAGNOSIS — N76.0 BV (BACTERIAL VAGINOSIS): Primary | ICD-10-CM

## 2019-11-25 LAB
HBV SURFACE AB TITR SER: <3.5 MIU/ML
HEPATITIS C ANTIBODY: NONREACTIVE
HIV AG/AB: NONREACTIVE
HPV SAMPLE: ABNORMAL
HPV, GENOTYPE 16: DETECTED
HPV, GENOTYPE 18: NOT DETECTED
HPV, HIGH RISK OTHER: DETECTED
HPV, INTERPRETATION: ABNORMAL
SPECIMEN DESCRIPTION: ABNORMAL
T. PALLIDUM, IGG: NONREACTIVE

## 2019-11-25 PROCEDURE — 86780 TREPONEMA PALLIDUM: CPT

## 2019-11-25 PROCEDURE — 36415 COLL VENOUS BLD VENIPUNCTURE: CPT

## 2019-11-25 PROCEDURE — 87389 HIV-1 AG W/HIV-1&-2 AB AG IA: CPT

## 2019-11-25 PROCEDURE — 86803 HEPATITIS C AB TEST: CPT

## 2019-11-25 PROCEDURE — 86317 IMMUNOASSAY INFECTIOUS AGENT: CPT

## 2019-11-25 RX ORDER — METRONIDAZOLE 500 MG/1
500 TABLET ORAL 2 TIMES DAILY
Qty: 14 TABLET | Refills: 0 | Status: SHIPPED | OUTPATIENT
Start: 2019-11-25 | End: 2019-12-02

## 2019-11-27 LAB — CYTOLOGY REPORT: NORMAL

## 2019-12-09 ENCOUNTER — HOSPITAL ENCOUNTER (OUTPATIENT)
Dept: MAMMOGRAPHY | Age: 39
Discharge: HOME | End: 2019-12-11
Payer: COMMERCIAL

## 2019-12-09 DIAGNOSIS — Z12.39 SCREENING FOR BREAST CANCER: ICD-10-CM

## 2019-12-09 DIAGNOSIS — Z80.3 FAMILY HISTORY OF BREAST CANCER: ICD-10-CM

## 2019-12-09 PROCEDURE — 77063 BREAST TOMOSYNTHESIS BI: CPT

## 2019-12-17 ENCOUNTER — TELEPHONE (OUTPATIENT)
Dept: ONCOLOGY | Age: 39
End: 2019-12-17

## 2019-12-17 DIAGNOSIS — Z91.89 AT HIGH RISK FOR BREAST CANCER: Primary | ICD-10-CM

## 2019-12-20 ENCOUNTER — HOSPITAL ENCOUNTER (OUTPATIENT)
Age: 39
Setting detail: SPECIMEN
Discharge: HOME | End: 2019-12-20
Payer: COMMERCIAL

## 2019-12-20 ENCOUNTER — PROCEDURE VISIT (OUTPATIENT)
Dept: OBGYN CLINIC | Age: 39
End: 2019-12-20
Payer: COMMERCIAL

## 2019-12-20 VITALS
DIASTOLIC BLOOD PRESSURE: 70 MMHG | HEIGHT: 62 IN | SYSTOLIC BLOOD PRESSURE: 120 MMHG | BODY MASS INDEX: 33.13 KG/M2 | WEIGHT: 180 LBS

## 2019-12-20 DIAGNOSIS — R87.810 ASCUS WITH POSITIVE HIGH RISK HPV CERVICAL: Primary | ICD-10-CM

## 2019-12-20 DIAGNOSIS — Z01.812 PRE-PROCEDURE LAB EXAM: ICD-10-CM

## 2019-12-20 DIAGNOSIS — R87.610 ASCUS WITH POSITIVE HIGH RISK HPV CERVICAL: Primary | ICD-10-CM

## 2019-12-20 LAB
CONTROL: NORMAL
PREGNANCY TEST URINE, POC: NEGATIVE

## 2019-12-20 PROCEDURE — 57454 BX/CURETT OF CERVIX W/SCOPE: CPT | Performed by: OBSTETRICS & GYNECOLOGY

## 2019-12-20 PROCEDURE — 81025 URINE PREGNANCY TEST: CPT | Performed by: OBSTETRICS & GYNECOLOGY

## 2019-12-24 LAB — SURGICAL PATHOLOGY REPORT: NORMAL

## 2019-12-26 ENCOUNTER — TELEPHONE (OUTPATIENT)
Dept: OBGYN CLINIC | Age: 39
End: 2019-12-26

## 2019-12-26 DIAGNOSIS — D06.9 ADENOCARCINOMA IN SITU (AIS) OF UTERINE CERVIX: Primary | ICD-10-CM

## 2019-12-27 ENCOUNTER — TELEPHONE (OUTPATIENT)
Dept: GYNECOLOGIC ONCOLOGY | Age: 39
End: 2019-12-27

## 2019-12-30 ENCOUNTER — TELEPHONE (OUTPATIENT)
Dept: ONCOLOGY | Age: 39
End: 2019-12-30

## 2020-01-03 RX ORDER — ATORVASTATIN CALCIUM 20 MG/1
TABLET, FILM COATED ORAL
Qty: 90 TABLET | Refills: 1 | OUTPATIENT
Start: 2020-01-03

## 2020-01-09 RX ORDER — ATORVASTATIN CALCIUM 20 MG/1
TABLET, FILM COATED ORAL
Qty: 90 TABLET | Refills: 1 | Status: SHIPPED | OUTPATIENT
Start: 2020-01-09 | End: 2020-06-23

## 2020-01-16 ENCOUNTER — OFFICE VISIT (OUTPATIENT)
Dept: GYNECOLOGIC ONCOLOGY | Age: 40
End: 2020-01-16
Payer: MEDICARE

## 2020-01-16 VITALS
WEIGHT: 179.2 LBS | DIASTOLIC BLOOD PRESSURE: 95 MMHG | SYSTOLIC BLOOD PRESSURE: 137 MMHG | TEMPERATURE: 97.5 F | HEART RATE: 87 BPM | HEIGHT: 62 IN | BODY MASS INDEX: 32.97 KG/M2

## 2020-01-16 PROCEDURE — G8428 CUR MEDS NOT DOCUMENT: HCPCS | Performed by: OBSTETRICS & GYNECOLOGY

## 2020-01-16 PROCEDURE — 1036F TOBACCO NON-USER: CPT | Performed by: OBSTETRICS & GYNECOLOGY

## 2020-01-16 PROCEDURE — 99204 OFFICE O/P NEW MOD 45 MIN: CPT | Performed by: OBSTETRICS & GYNECOLOGY

## 2020-01-16 PROCEDURE — G8484 FLU IMMUNIZE NO ADMIN: HCPCS | Performed by: OBSTETRICS & GYNECOLOGY

## 2020-01-16 PROCEDURE — G8417 CALC BMI ABV UP PARAM F/U: HCPCS | Performed by: OBSTETRICS & GYNECOLOGY

## 2020-01-16 ASSESSMENT — ENCOUNTER SYMPTOMS
COUGH: 1
BACK PAIN: 1

## 2020-01-16 NOTE — PATIENT INSTRUCTIONS
Please call us within a month to let us know your decision between cone biopsy of cervix and hysterectomy.

## 2020-01-16 NOTE — PROGRESS NOTES
Review of Systems   Respiratory: Positive for cough. Has cold   Musculoskeletal: Positive for back pain. Neurological: Positive for headaches and seizures. Hx of headaches & seizures   Hematological: Bruises/bleeds easily. Psychiatric/Behavioral: The patient is nervous/anxious.

## 2020-01-16 NOTE — PROGRESS NOTES
[address] [facility], [facility], [address] [facility] [address]      I am seeing Freida Simpson for New Patient at the request of Dr. Luz Maria Ortiz and LINDSAY Yates. Chief Complaint: Adenocarcinoma in situ of cervix    HPI  She is a 42 y.o.  3, para 0, AB 3 female who is being referred for a recent diagnosis of adenocarcinoma in situ of the cervix. The patient saw LINDSAY Yates for an annual examination on 2019. The patient had a new partner since 2019. Patient had been having regular menses lasting 3 to 4 days with no spotting between  Examination was essentially normal.  And a Pap smear was performed. Patient admitted that in  she had an abnormal Pap smear and a subsequent LEEP cone biopsy of the cervix. She has had yearly Pap smears since that time and all have been normal up until the recent 1. Patient was placed on birth control pill. Pap smear returned \"atypical squamous cells of undetermined significance\" high risk HPV was ordered and a high risk HPV type \"other\". Was detected. Graph the patient was referred to Dr. Melissa pimentel and a colposcopy was performed on 2019. Acetowhite changes were noted at 1:00 and 10:00 and biopsies were taken. An endocervical curettage was also performed. Pathology report returned with all specimens showing \"adenocarcinoma in situ including the endocervical curettage. The patient was referred to [address] [place] [address] gynecologic oncology for further work-up and a discussion of management options. Patient states that at this point in time she would like to maintain her fertility options. .    Review of Systems  I have personally reviewed and agree with the review of systems done by my ancillary staff in the [facility] documentation. The patient does have \"aortic stenosis and pulmonary stenosis\". She does have a cardiologist, Dr. Constantino Ramon.   In addition the patient has Problem Relation Age of Onset   Santiago Diabetes Father     Other Mother         blood disorder    Other Maternal Aunt         blood disorder    Cancer Maternal Aunt         breast    Breast Cancer Maternal Aunt     Other Paternal Aunt         chiari malformation type 2     Cancer Paternal Aunt         ovarian cancer    Cancer Maternal Grandmother     Cancer Maternal Grandfather     High Blood Pressure Paternal Grandfather     Heart Disease Paternal Grandfather     Cancer Paternal Grandfather     Other Maternal Aunt         brain aneurysm       Assessment:  1. Adenocarcinoma in situ (AIS) of uterine cervix       2. Cyst, aortic stenosis, asthma, multiple other medical comorbidities. 3.  Desire to maintain fertility at this time. Discussion: Discussion was then held with the patient with her  and mother present. I explained that there were 2 surgical options 1 a cold knife conization biopsy along with endometrial and endocervical curettage and the other option would be for a minimally invasive YUMIKO with possible BSO no. I explained that the hysterectomy was the more definitive procedure for taking care of the problem but that of course she would not be able to have children. If we removed her ovaries she would also enter the menopause. I mentioned that if the ovaries look normal we would certainly try to leave them and probably just take out the tubes. The cold knife conization biopsy would also further decrease her chances of obtaining a present pregnancy and keeping a pregnancy. We talked about the multifocal nature of adenocarcinoma in the endocervical canals and that a recurrence was still possible after a cold knife cone biopsy. All of her questions were answered to her satisfaction and she stated that she wishes to think about it and discuss it further with her family and that she will call us and let us know of her decision.     Plan: Have the patient call us within a month's time to discuss options for therapy. Follow Up: 1 month      INFORMED CONSENT:  We discussed options including but not limited to observation, and various surgical options including cold knife conization biopsy and hysterectomy. Benefits of the procedure(s) include but not limited to treatment, possible staging of precancerous/cancerous lesions and/or improvement in systems and quality of life. The procedure(s) were explained in great detail along with all the possible risks and complications including, but not limited to blood loss requiring transfusions, DVT requiring blood thinning agents, injury to surrounding structures including bladder, bowel ureters, etc., as well as the possibility of infection requiring prophylactic antibiotics. I spent 45 minutes providing care to the patient and >50% of the time was spent counseling and conoordinating care, discussing the patient's current situation, reviewing her options, counseling and education her and answering her questions. All of the patient's pertinent images, labs and previous records and procedures were reviewed.     Electronically signed by Michelle Trejo MD on 1/16/20 at 10:52 AM

## 2020-01-20 ENCOUNTER — TELEPHONE (OUTPATIENT)
Dept: GYNECOLOGIC ONCOLOGY | Age: 40
End: 2020-01-20

## 2020-01-20 NOTE — TELEPHONE ENCOUNTER
Pt called and is having ultrasound tomorrow. Wants to know when she needs to come in for appt. States she has decided for hysterectomy for surgery.

## 2020-01-21 ENCOUNTER — HOSPITAL ENCOUNTER (OUTPATIENT)
Dept: ULTRASOUND IMAGING | Facility: CLINIC | Age: 40
Discharge: HOME | End: 2020-01-23
Payer: MEDICARE

## 2020-01-21 ENCOUNTER — TELEPHONE (OUTPATIENT)
Dept: NEUROLOGY | Age: 40
End: 2020-01-21

## 2020-01-21 PROCEDURE — 76830 TRANSVAGINAL US NON-OB: CPT

## 2020-01-21 PROCEDURE — 76856 US EXAM PELVIC COMPLETE: CPT

## 2020-01-23 ENCOUNTER — OFFICE VISIT (OUTPATIENT)
Dept: GYNECOLOGIC ONCOLOGY | Age: 40
End: 2020-01-23
Payer: MEDICARE

## 2020-01-23 ENCOUNTER — TELEPHONE (OUTPATIENT)
Dept: GYNECOLOGIC ONCOLOGY | Age: 40
End: 2020-01-23

## 2020-01-23 VITALS
HEART RATE: 93 BPM | WEIGHT: 184 LBS | TEMPERATURE: 98.6 F | SYSTOLIC BLOOD PRESSURE: 150 MMHG | BODY MASS INDEX: 33.86 KG/M2 | HEIGHT: 62 IN | DIASTOLIC BLOOD PRESSURE: 94 MMHG

## 2020-01-23 PROCEDURE — 1036F TOBACCO NON-USER: CPT | Performed by: OBSTETRICS & GYNECOLOGY

## 2020-01-23 PROCEDURE — G8427 DOCREV CUR MEDS BY ELIG CLIN: HCPCS | Performed by: OBSTETRICS & GYNECOLOGY

## 2020-01-23 PROCEDURE — G8417 CALC BMI ABV UP PARAM F/U: HCPCS | Performed by: OBSTETRICS & GYNECOLOGY

## 2020-01-23 PROCEDURE — G8484 FLU IMMUNIZE NO ADMIN: HCPCS | Performed by: OBSTETRICS & GYNECOLOGY

## 2020-01-23 PROCEDURE — 99213 OFFICE O/P EST LOW 20 MIN: CPT | Performed by: OBSTETRICS & GYNECOLOGY

## 2020-01-23 NOTE — TELEPHONE ENCOUNTER
Lvm for pt to return call. Calling to notify will need to have cardiac and medical clearance for surgery.  Does pt have Cardiologist?

## 2020-01-23 NOTE — PROGRESS NOTES
her satisfaction. The patient understands that if abnormal ovaries are noted that they should also be removed which would lead to menopause. She understands that there is also a possibility of laparotomy. Review of Systems  I have personally reviewed and agree with the review of the systems done by my ancillary staff in the [facility] documentation. The patient states that she does have a history of pulmonic stenosis and aortic stenosis and sees Dr. Raheem Henderson, cardiologist.  She has a number of medical comorbidities as well. See list.    Objective:  Vitals:    01/23/20 1130 01/23/20 1145   BP: (!) 144/99 (!) 150/94   Pulse: 86 93   Temp: 98.6 °F (37 °C)    TempSrc: Oral    Weight: 184 lb (83.5 kg)    Height: 5' 2\" (1.575 m)        Physical Exam:  General:  Alert and oriented x 3, No acute distress    Further examination was not repeated today as she was just seen and examined on January 16, 2020. Us Non Ob Transvaginal    Result Date: 1/21/2020  EXAMINATION: PELVIC ULTRASOUND 1/21/2020 TECHNIQUE: Transabdominal and transvaginal pelvic ultrasound was performed with color doppler flow evaluation without spectral waveform analysis. COMPARISON: CT abdomen pelvis from 04/25/2019 HISTORY: ORDERING SYSTEM PROVIDED HISTORY: Adenocarcinoma in situ (AIS) of uterine cervix 72-year-old female with adenocarcinoma in situ of uterine cervix FINDINGS: Measurements: Patient's LMP is 01/09/2020. Uterus: 6.6 x 2.1 x 3.4 cm Endometrial stripe:  6 mm Right Ovary:  2.2 x 0.9 x 1.6 cm Left Ovary:  2.3 x 1.3 x 1.5 cm Ultrasound Findings: Uterus: Uterus demonstrates normal myometrial echotexture. Endometrial stripe: Endometrial stripe is within normal limits. Right Ovary: Right ovary is within normal limits. Right ovarian follicles. Left Ovary:  Left ovary is within normal limits. Left ovarian follicles. Free Fluid: No evidence of free fluid. Overall, unremarkable pelvic ultrasound. Bilateral ovarian follicles.  Endometrial stripe thickness within normal limits measuring 6 mm. Us Pelvis Complete    Result Date: 1/21/2020  EXAMINATION: PELVIC ULTRASOUND 1/21/2020 TECHNIQUE: Transabdominal and transvaginal pelvic ultrasound was performed with color doppler flow evaluation without spectral waveform analysis. COMPARISON: CT abdomen pelvis from 04/25/2019 HISTORY: ORDERING SYSTEM PROVIDED HISTORY: Adenocarcinoma in situ (AIS) of uterine cervix 82-year-old female with adenocarcinoma in situ of uterine cervix FINDINGS: Measurements: Patient's LMP is 01/09/2020. Uterus: 6.6 x 2.1 x 3.4 cm Endometrial stripe:  6 mm Right Ovary:  2.2 x 0.9 x 1.6 cm Left Ovary:  2.3 x 1.3 x 1.5 cm Ultrasound Findings: Uterus: Uterus demonstrates normal myometrial echotexture. Endometrial stripe: Endometrial stripe is within normal limits. Right Ovary: Right ovary is within normal limits. Right ovarian follicles. Left Ovary:  Left ovary is within normal limits. Left ovarian follicles. Free Fluid: No evidence of free fluid. Overall, unremarkable pelvic ultrasound. Bilateral ovarian follicles. Endometrial stripe thickness within normal limits measuring 6 mm. No components found for: C125    Assessment:      1. Adenocarcinoma in situ (AIS) of uterine cervix      2. History of pulmonic stenosis and aortic stenosis      . Plan:  Robotic laparoscopic total abdominal hysterectomy, bilateral salpingectomies. ,  Peritoneal washings for cytology. Possible bilateral oophorectomies. Possible laparotomy and staging biopsies. Follow Up:  Postoperatively    I spent 15 minutes providing care to the patient and >50% of the time was spent counseling and conoordinating care, discussing the patient's current situation, reviewing her options, counseling and education her and answering her questions. All of the patient's pertinent images, labs and previous records and procedures were reviewed.     Electronically signed by Cristal Nayak MD on 1/23/20 at 1:39 PM

## 2020-01-23 NOTE — PATIENT INSTRUCTIONS
POST OPERATIVE INSTRUCTIONS for HYSTERECTOMY    Pain Control:  · You may feel some chest, shoulder, or abdominal discomfort for a few days. This discomfort is a result of the gas that was introduced into the abdomen during surgery. Your body will absorb this gas within 24 to 48 hours which will relieve these symptoms. In the meantime, it may be helpful to apply heat to your abdomen or to lie flat. It is important to take a stool softener such as Colace while taking narcotic pain medication such as Percocet or Vicodin. Please purchase a stool softener before your surgery. You may take \"over the counter\" paint relievers that do not contain aspirin such as acetaminophen (Tylenol) or Ibuprofen (Motrin or Advil). Do not exceed the daily recommended dose. Note:  If the Pain is not relieved by pain medication, becomes worse, or you have difficulty breathing, call our office. Incision Care:  · Inspect your incision(s) daily. · A small amount of blood or clear drainage from the incisions is normal and not a cause for concern. · Bruising around your incision sites is common and not a cause for concern. · Your incisions may become itchy for a few days. This is part of the normal healing process. · As your incisions heal, they will change in color and may become numb for several weeks. · If you have small dressings or band-aids, they may be removed 24 hours. · If you have steri-strips (small adhesive strips) in place, they will peel and fall off. If they do not fall off within 10 days, carefully peel them off. · If you have stitches, they will dissolve on their own. · If you have staples, they will be removed at the post-operative visit. Note:  If you notice any redness, heave drainage, or bleeding from your incisions, please call our office at [phone]. Nutrition:  You may resume the diet you had prior to surgery. Drink 6-8 glasses of water daily. Bowel Function:   For the first several days

## 2020-01-24 ENCOUNTER — PREP FOR PROCEDURE (OUTPATIENT)
Dept: GYNECOLOGIC ONCOLOGY | Age: 40
End: 2020-01-24

## 2020-01-24 RX ORDER — SODIUM CHLORIDE 0.9 % (FLUSH) 0.9 %
10 SYRINGE (ML) INJECTION PRN
Status: CANCELLED | OUTPATIENT
Start: 2020-01-24

## 2020-01-24 RX ORDER — SODIUM CHLORIDE 9 MG/ML
INJECTION, SOLUTION INTRAVENOUS CONTINUOUS
Status: CANCELLED | OUTPATIENT
Start: 2020-01-24

## 2020-01-24 RX ORDER — ACETAMINOPHEN 325 MG/1
1000 TABLET ORAL ONCE
Status: CANCELLED | OUTPATIENT
Start: 2020-01-24 | End: 2020-01-24

## 2020-01-24 RX ORDER — ONDANSETRON 2 MG/ML
4 INJECTION INTRAMUSCULAR; INTRAVENOUS ONCE
Status: CANCELLED | OUTPATIENT
Start: 2020-01-24 | End: 2020-01-24

## 2020-01-24 RX ORDER — SODIUM CHLORIDE 0.9 % (FLUSH) 0.9 %
10 SYRINGE (ML) INJECTION EVERY 12 HOURS SCHEDULED
Status: CANCELLED | OUTPATIENT
Start: 2020-01-24

## 2020-01-28 ENCOUNTER — INITIAL CONSULT (OUTPATIENT)
Dept: ONCOLOGY | Age: 40
End: 2020-01-28
Payer: MEDICARE

## 2020-01-28 PROCEDURE — 96040 PR GENETIC COUNSELING, EACH 30 MIN: CPT | Performed by: GENETIC COUNSELOR, MS

## 2020-01-28 NOTE — PROGRESS NOTES
[facility]   Hereditary Cancer Risk Assessment     Name: Eunice Maurice   YOB: 1980   Date of Consultation: 1/28/20     Ms. Wayne Maurice was seen at the [facility] for genetic counseling on 1/28/20. Ms. Wayne Maurice was referred by LINDSAY Robb due to her family history of cancer. PERSONAL HISTORY   Ms. Wayne Maurice is a 42 y.o.  female who was recently diagnosed with cervical cancer at age 42. She follows with Dr. Bethany Toth and is planning for hysterectomy and bilateral salpingectomy on 1/17/20. The current surgical plan is to leave her ovaries intact. Ms. [address] reports menarche at age 17, is nulliparous, and is premenopausal.      She did have a mammogram recently which was normal; however, she did have a lifetime risk assessment performed. According to the [employer] risk model, her lifetime risk is approximately 20.2%. She is planning to pursue annual breast MRI screening staggered 6 months from her annual mammogram.     FAMILY HISTORY  Ms. Wayne Maurice has no biological children. She has one brother (43y) and two sisters (40 and 55y). Ms. Jimena Negro mother is living at age 61. A maternal aunt was diagnosed with cervical cancer at age 32 and breast cancer at age 36. Her other maternal aunt passed away at age 45 from an aneurysm. Neither of her maternal uncles had cancer. She also reports that her maternal grandmother had a history of uterine cancer in her 46s. Ms. Jimena Negro father is living at age 72. She has a large paternal family with 5 uncles and 1 aunt. It was originally reported that Ms. Simpson's paternal aunt had ovarian cancer, but upon further research she now reports this as cervical cancer. Ms. [address] reports [place] ancestry and denies any known Ashkenazi Taoist heritage.      RISK ASSESSMENT   We discussed that approximately 5-10% of cancers are due to a

## 2020-01-29 ENCOUNTER — OFFICE VISIT (OUTPATIENT)
Dept: FAMILY MEDICINE CLINIC | Age: 40
End: 2020-01-29
Payer: MEDICARE

## 2020-01-29 VITALS
TEMPERATURE: 98.4 F | HEIGHT: 62 IN | BODY MASS INDEX: 34.12 KG/M2 | RESPIRATION RATE: 16 BRPM | WEIGHT: 185.4 LBS | DIASTOLIC BLOOD PRESSURE: 85 MMHG | HEART RATE: 103 BPM | SYSTOLIC BLOOD PRESSURE: 130 MMHG

## 2020-01-29 PROCEDURE — 96160 PT-FOCUSED HLTH RISK ASSMT: CPT | Performed by: PHYSICIAN ASSISTANT

## 2020-01-29 PROCEDURE — G8427 DOCREV CUR MEDS BY ELIG CLIN: HCPCS | Performed by: PHYSICIAN ASSISTANT

## 2020-01-29 PROCEDURE — G8417 CALC BMI ABV UP PARAM F/U: HCPCS | Performed by: PHYSICIAN ASSISTANT

## 2020-01-29 PROCEDURE — 99214 OFFICE O/P EST MOD 30 MIN: CPT | Performed by: PHYSICIAN ASSISTANT

## 2020-01-29 PROCEDURE — 1036F TOBACCO NON-USER: CPT | Performed by: PHYSICIAN ASSISTANT

## 2020-01-29 PROCEDURE — G8484 FLU IMMUNIZE NO ADMIN: HCPCS | Performed by: PHYSICIAN ASSISTANT

## 2020-01-29 RX ORDER — ALBUTEROL SULFATE 90 UG/1
AEROSOL, METERED RESPIRATORY (INHALATION) PRN
COMMUNITY
Start: 2020-01-21 | End: 2020-05-06 | Stop reason: SDUPTHER

## 2020-01-29 ASSESSMENT — ENCOUNTER SYMPTOMS
NAUSEA: 0
BLOOD IN STOOL: 0
RHINORRHEA: 0
EYE REDNESS: 0
ABDOMINAL DISTENTION: 0
VOMITING: 0
DIARRHEA: 0
BACK PAIN: 0
WHEEZING: 0
TROUBLE SWALLOWING: 0
CONSTIPATION: 0
PHOTOPHOBIA: 0
SHORTNESS OF BREATH: 0
SINUS PRESSURE: 0
CHEST TIGHTNESS: 0
COUGH: 0
SORE THROAT: 0
SINUS PAIN: 0
ABDOMINAL PAIN: 0
COLOR CHANGE: 0

## 2020-01-29 ASSESSMENT — PATIENT HEALTH QUESTIONNAIRE - PHQ9
10. IF YOU CHECKED OFF ANY PROBLEMS, HOW DIFFICULT HAVE THESE PROBLEMS MADE IT FOR YOU TO DO YOUR WORK, TAKE CARE OF THINGS AT HOME, OR GET ALONG WITH OTHER PEOPLE: 0
SUM OF ALL RESPONSES TO PHQ QUESTIONS 1-9: 4
SUM OF ALL RESPONSES TO PHQ QUESTIONS 1-9: 4
4. FEELING TIRED OR HAVING LITTLE ENERGY: 1
3. TROUBLE FALLING OR STAYING ASLEEP: 1
8. MOVING OR SPEAKING SO SLOWLY THAT OTHER PEOPLE COULD HAVE NOTICED. OR THE OPPOSITE, BEING SO FIGETY OR RESTLESS THAT YOU HAVE BEEN MOVING AROUND A LOT MORE THAN USUAL: 0
SUM OF ALL RESPONSES TO PHQ9 QUESTIONS 1 & 2: 2
9. THOUGHTS THAT YOU WOULD BE BETTER OFF DEAD, OR OF HURTING YOURSELF: 0
1. LITTLE INTEREST OR PLEASURE IN DOING THINGS: 1
5. POOR APPETITE OR OVEREATING: 0
2. FEELING DOWN, DEPRESSED OR HOPELESS: 1
6. FEELING BAD ABOUT YOURSELF - OR THAT YOU ARE A FAILURE OR HAVE LET YOURSELF OR YOUR FAMILY DOWN: 0
7. TROUBLE CONCENTRATING ON THINGS, SUCH AS READING THE NEWSPAPER OR WATCHING TELEVISION: 0

## 2020-01-29 NOTE — PROGRESS NOTES
CAUSES HEADACHE, RASH    Seasonal     Sulfamethoxazole-Trimethoprim Hives    Tetanus Toxoid, Adsorbed     Tetanus Toxoids          Current Outpatient Medications   Medication Sig Dispense Refill    albuterol sulfate  (90 Base) MCG/ACT inhaler       atorvastatin (LIPITOR) 20 MG tablet take 1 tablet by mouth once daily 90 tablet 1    norgestrel-ethinyl estradiol (CRYSELLE-28) 0.3-30 MG-MCG per tablet Take 1 tablet by mouth daily 1 packet 11    omeprazole (PRILOSEC) 20 MG delayed release capsule Take 1 capsule by mouth Daily (Patient taking differently: Take 20 mg by mouth daily as needed ) 90 capsule 1    Erenumab-aooe (AIMOVIG) 140 MG/ML SOAJ Inject 140 mg into the skin every 30 days 1 pen 3    tiZANidine (ZANAFLEX) 4 MG tablet Take 4 mg by mouth every 8 hours as needed      lidocaine (XYLOCAINE) 5 % ointment Apply topically as needed. 1 Tube 0    RA VITAMIN D-3 2000 units CAPS take 1 capsule by mouth once daily 90 capsule 1    Tetrahydrozoline-Zn Sulfate (EYE DROPS ALLERGY RELIEF OP) Apply 2 drops to eye 2 times daily as needed      SUMAtriptan (IMITREX) 6 MG/0.5ML SOLN injection Inject 6 mg into the skin as needed (migraine) No more than 2 doses in a 24 hr period 1 mL 3    ibuprofen (ADVIL;MOTRIN) 800 MG tablet as needed       fluticasone (FLONASE) 50 MCG/ACT nasal spray 2 sprays by Nasal route daily as needed       diphenhydrAMINE (BENADRYL) 25 MG tablet Take 25 mg by mouth every 6 hours as needed for Itching       No current facility-administered medications for this visit. Social History     Tobacco Use    Smoking status: Former Smoker     Types: Cigarettes    Smokeless tobacco: Never Used    Tobacco comment: half pack a month quit 2006   Substance Use Topics    Alcohol use:  Yes     Alcohol/week: 0.0 standard drinks     Comment: social     Drug use: Yes     Types: Marijuana     Comment: edible cannabis for pain       Family History   Problem Relation Age of Onset    Diabetes Tenderness: There is no abdominal tenderness. There is no right CVA tenderness, left CVA tenderness, guarding or rebound. Musculoskeletal: Normal range of motion. General: No tenderness. Lymphadenopathy:      Head:      Right side of head: No submental, submandibular, tonsillar, preauricular, posterior auricular or occipital adenopathy. Left side of head: No submental, submandibular, tonsillar, preauricular or posterior auricular adenopathy. Cervical: No cervical adenopathy. Right cervical: No superficial, deep or posterior cervical adenopathy. Left cervical: No superficial, deep or posterior cervical adenopathy. Upper Body:      Right upper body: No supraclavicular adenopathy. Left upper body: No supraclavicular adenopathy. Skin:     General: Skin is warm and dry. Coloration: Skin is not pale. Findings: No erythema or rash. Neurological:      General: No focal deficit present. Mental Status: She is alert and oriented to person, place, and time. Cranial Nerves: Cranial nerves are intact. No cranial nerve deficit. Sensory: Sensation is intact. Motor: Motor function is intact. Coordination: Coordination is intact. Coordination normal.      Gait: Gait is intact. Deep Tendon Reflexes: Reflexes are normal and symmetric. Psychiatric:         Attention and Perception: Attention and perception normal.         Mood and Affect: Mood and affect normal.         Speech: Speech normal.         Behavior: Behavior normal. Behavior is cooperative. Thought Content:  Thought content normal.         Cognition and Memory: Cognition and memory normal.         Judgment: Judgment normal.         Vitals:    01/29/20 0822   BP: 130/85   Site: Left Upper Arm   Position: Sitting   Cuff Size: Medium Adult   Pulse: 103   Resp: 16   Temp: 98.4 °F (36.9 °C)   TempSrc: Oral   Weight: 185 lb 6.4 oz (84.1 kg)   Height: 5' 2.01\" (1.575 m)     BP Readings from

## 2020-01-30 ENCOUNTER — HOSPITAL ENCOUNTER (OUTPATIENT)
Age: 40
Setting detail: SPECIMEN
Discharge: HOME | End: 2020-01-30
Payer: MEDICARE

## 2020-01-30 LAB
CHOLESTEROL/HDL RATIO: 2.6
CHOLESTEROL: 170 MG/DL
HDLC SERPL-MCNC: 65 MG/DL
LDL CHOLESTEROL: 79 MG/DL (ref 0–130)
T. PALLIDUM, IGG: NONREACTIVE
THYROXINE, FREE: 1.21 NG/DL (ref 0.93–1.7)
TRIGL SERPL-MCNC: 132 MG/DL
TSH SERPL DL<=0.05 MIU/L-ACNC: 3.09 MIU/L (ref 0.3–5)
VITAMIN D 25-HYDROXY: 44.8 NG/ML (ref 30–100)
VLDLC SERPL CALC-MCNC: NORMAL MG/DL (ref 1–30)

## 2020-02-05 ENCOUNTER — HOSPITAL ENCOUNTER (OUTPATIENT)
Dept: PREADMISSION TESTING | Age: 40
Discharge: HOME | End: 2020-02-09
Payer: MEDICARE

## 2020-02-05 VITALS
SYSTOLIC BLOOD PRESSURE: 146 MMHG | OXYGEN SATURATION: 98 % | DIASTOLIC BLOOD PRESSURE: 78 MMHG | BODY MASS INDEX: 33.84 KG/M2 | WEIGHT: 183.86 LBS | HEART RATE: 86 BPM | RESPIRATION RATE: 16 BRPM | TEMPERATURE: 98.8 F | HEIGHT: 62 IN

## 2020-02-05 LAB
-: NORMAL
ABO/RH: NORMAL
ABSOLUTE EOS #: 0.07 K/UL (ref 0–0.44)
ABSOLUTE IMMATURE GRANULOCYTE: 0.04 K/UL (ref 0–0.3)
ABSOLUTE LYMPH #: 2.9 K/UL (ref 1.1–3.7)
ABSOLUTE MONO #: 0.9 K/UL (ref 0.1–1.2)
ALBUMIN SERPL-MCNC: 4.1 G/DL (ref 3.5–5.2)
ALBUMIN/GLOBULIN RATIO: 1.1 (ref 1–2.5)
ALP BLD-CCNC: 57 U/L (ref 35–104)
ALT SERPL-CCNC: 9 U/L (ref 5–33)
AMORPHOUS: NORMAL
ANION GAP SERPL CALCULATED.3IONS-SCNC: 13 MMOL/L (ref 9–17)
ANTIBODY SCREEN: NEGATIVE
ARM BAND NUMBER: NORMAL
AST SERPL-CCNC: 15 U/L
BACTERIA: NORMAL
BASOPHILS # BLD: 0 % (ref 0–2)
BASOPHILS ABSOLUTE: 0.03 K/UL (ref 0–0.2)
BILIRUB SERPL-MCNC: 0.74 MG/DL (ref 0.3–1.2)
BILIRUBIN URINE: NEGATIVE
BUN BLDV-MCNC: 11 MG/DL (ref 6–20)
BUN/CREAT BLD: ABNORMAL (ref 9–20)
CA 125: 6 U/ML
CALCIUM SERPL-MCNC: 9.1 MG/DL (ref 8.6–10.4)
CARCINOEMBRYONIC ANTIGEN: 0.8 NG/ML
CASTS UA: NORMAL /LPF (ref 0–8)
CHLORIDE BLD-SCNC: 103 MMOL/L (ref 98–107)
CO2: 22 MMOL/L (ref 20–31)
COLOR: ABNORMAL
COMMENT UA: ABNORMAL
CREAT SERPL-MCNC: 0.48 MG/DL (ref 0.5–0.9)
CRYSTALS, UA: NORMAL /HPF
DIFFERENTIAL TYPE: ABNORMAL
EOSINOPHILS RELATIVE PERCENT: 1 % (ref 1–4)
EPITHELIAL CELLS UA: NORMAL /HPF (ref 0–5)
EXPIRATION DATE: NORMAL
GFR AFRICAN AMERICAN: >60 ML/MIN
GFR NON-AFRICAN AMERICAN: >60 ML/MIN
GFR SERPL CREATININE-BSD FRML MDRD: ABNORMAL ML/MIN/{1.73_M2}
GFR SERPL CREATININE-BSD FRML MDRD: ABNORMAL ML/MIN/{1.73_M2}
GLUCOSE BLD-MCNC: 82 MG/DL (ref 70–99)
GLUCOSE URINE: NEGATIVE
HCG(URINE) PREGNANCY TEST: NEGATIVE
HCT VFR BLD CALC: 47.5 % (ref 36.3–47.1)
HEMOGLOBIN: 15.2 G/DL (ref 11.9–15.1)
IMMATURE GRANULOCYTES: 0 %
KETONES, URINE: NEGATIVE
LEUKOCYTE ESTERASE, URINE: NEGATIVE
LYMPHOCYTES # BLD: 23 % (ref 24–43)
MCH RBC QN AUTO: 29.3 PG (ref 25.2–33.5)
MCHC RBC AUTO-ENTMCNC: 32 G/DL (ref 28.4–34.8)
MCV RBC AUTO: 91.5 FL (ref 82.6–102.9)
MONOCYTES # BLD: 7 % (ref 3–12)
MUCUS: NORMAL
NITRITE, URINE: NEGATIVE
NRBC AUTOMATED: 0 PER 100 WBC
OTHER OBSERVATIONS UA: NORMAL
PDW BLD-RTO: 13.3 % (ref 11.8–14.4)
PH UA: 5 (ref 5–8)
PLATELET # BLD: 388 K/UL (ref 138–453)
PLATELET ESTIMATE: ABNORMAL
PMV BLD AUTO: 9 FL (ref 8.1–13.5)
POTASSIUM SERPL-SCNC: 4.2 MMOL/L (ref 3.7–5.3)
PROTEIN UA: NEGATIVE
RBC # BLD: 5.19 M/UL (ref 3.95–5.11)
RBC # BLD: ABNORMAL 10*6/UL
RBC UA: NORMAL /HPF (ref 0–4)
RENAL EPITHELIAL, UA: NORMAL /HPF
SEG NEUTROPHILS: 69 % (ref 36–65)
SEGMENTED NEUTROPHILS ABSOLUTE COUNT: 8.59 K/UL (ref 1.5–8.1)
SODIUM BLD-SCNC: 138 MMOL/L (ref 135–144)
SPECIFIC GRAVITY UA: 1.03 (ref 1–1.03)
TOTAL PROTEIN: 7.9 G/DL (ref 6.4–8.3)
TRICHOMONAS: NORMAL
TURBIDITY: CLEAR
URINE HGB: ABNORMAL
UROBILINOGEN, URINE: NORMAL
WBC # BLD: 12.5 K/UL (ref 3.5–11.3)
WBC # BLD: ABNORMAL 10*3/UL
WBC UA: NORMAL /HPF (ref 0–5)
YEAST: NORMAL

## 2020-02-05 PROCEDURE — 82378 CARCINOEMBRYONIC ANTIGEN: CPT

## 2020-02-05 PROCEDURE — 86900 BLOOD TYPING SEROLOGIC ABO: CPT

## 2020-02-05 PROCEDURE — 80053 COMPREHEN METABOLIC PANEL: CPT

## 2020-02-05 PROCEDURE — 86304 IMMUNOASSAY TUMOR CA 125: CPT

## 2020-02-05 PROCEDURE — 86901 BLOOD TYPING SEROLOGIC RH(D): CPT

## 2020-02-05 PROCEDURE — 81025 URINE PREGNANCY TEST: CPT

## 2020-02-05 PROCEDURE — 93005 ELECTROCARDIOGRAM TRACING: CPT

## 2020-02-05 PROCEDURE — 86850 RBC ANTIBODY SCREEN: CPT

## 2020-02-05 PROCEDURE — 85025 COMPLETE CBC W/AUTO DIFF WBC: CPT

## 2020-02-05 PROCEDURE — 81001 URINALYSIS AUTO W/SCOPE: CPT

## 2020-02-05 RX ORDER — SODIUM CHLORIDE, SODIUM LACTATE, POTASSIUM CHLORIDE, CALCIUM CHLORIDE 600; 310; 30; 20 MG/100ML; MG/100ML; MG/100ML; MG/100ML
1000 INJECTION, SOLUTION INTRAVENOUS CONTINUOUS
Status: CANCELLED | OUTPATIENT
Start: 2020-02-05

## 2020-02-05 RX ORDER — AZELASTINE HYDROCHLORIDE 0.5 MG/ML
1 SOLUTION/ DROPS OPHTHALMIC PRN
COMMUNITY
End: 2021-12-07

## 2020-02-06 PROBLEM — I51.7 MILD LEFT ATRIAL ENLARGEMENT: Status: ACTIVE | Noted: 2020-02-06

## 2020-02-07 ENCOUNTER — TELEPHONE (OUTPATIENT)
Dept: FAMILY MEDICINE CLINIC | Age: 40
End: 2020-02-07

## 2020-02-07 LAB
EKG ATRIAL RATE: 71 BPM
EKG P AXIS: -23 DEGREES
EKG P-R INTERVAL: 124 MS
EKG Q-T INTERVAL: 398 MS
EKG QRS DURATION: 76 MS
EKG QTC CALCULATION (BAZETT): 432 MS
EKG R AXIS: 1 DEGREES
EKG T AXIS: 13 DEGREES
EKG VENTRICULAR RATE: 71 BPM

## 2020-02-07 PROCEDURE — 93010 ELECTROCARDIOGRAM REPORT: CPT | Performed by: INTERNAL MEDICINE

## 2020-02-07 NOTE — TELEPHONE ENCOUNTER
Pt called to inform the office that her insurance will not cover 90 day supply of any med. She had to get a 30 day supply of her Vit D from the pharmacy, and requested qty to be adjusted in chart.

## 2020-02-11 ENCOUNTER — TELEPHONE (OUTPATIENT)
Dept: NEUROLOGY | Age: 40
End: 2020-02-11

## 2020-02-12 ENCOUNTER — TELEPHONE (OUTPATIENT)
Dept: FAMILY MEDICINE CLINIC | Age: 40
End: 2020-02-12

## 2020-02-12 ENCOUNTER — TELEPHONE (OUTPATIENT)
Dept: GYNECOLOGIC ONCOLOGY | Age: 40
End: 2020-02-12

## 2020-02-12 NOTE — TELEPHONE ENCOUNTER
Called PCP Chris Pitt office to check status of medical clearance. Staff  Asks that writer refax letter and will put on her desk to be completed. Notified surgery is 2/17/20.

## 2020-02-12 NOTE — TELEPHONE ENCOUNTER
Patient is having surgery on 2/17/2020 the office is calling for a clearance will be sending fax over to be signed.   Will scan paperwork in the chart

## 2020-02-16 ENCOUNTER — ANESTHESIA EVENT (OUTPATIENT)
Dept: OPERATING ROOM | Age: 40
End: 2020-02-16
Payer: MEDICARE

## 2020-02-17 ENCOUNTER — ANESTHESIA (OUTPATIENT)
Dept: OPERATING ROOM | Age: 40
End: 2020-02-17
Payer: MEDICARE

## 2020-02-17 ENCOUNTER — HOSPITAL ENCOUNTER (OUTPATIENT)
Age: 40
Discharge: HOME | End: 2020-02-18
Attending: OBSTETRICS & GYNECOLOGY | Admitting: OBSTETRICS & GYNECOLOGY
Payer: MEDICARE

## 2020-02-17 ENCOUNTER — TELEPHONE (OUTPATIENT)
Dept: ONCOLOGY | Age: 40
End: 2020-02-17

## 2020-02-17 VITALS — TEMPERATURE: 97.2 F | OXYGEN SATURATION: 99 % | DIASTOLIC BLOOD PRESSURE: 114 MMHG | SYSTOLIC BLOOD PRESSURE: 139 MMHG

## 2020-02-17 LAB
CASE NUMBER:: NORMAL
HCG, PREGNANCY URINE (POC): NEGATIVE
SPECIMEN DESCRIPTION: NORMAL

## 2020-02-17 PROCEDURE — 6370000000 HC RX 637 (ALT 250 FOR IP): Performed by: ANESTHESIOLOGY

## 2020-02-17 PROCEDURE — 2580000003 HC RX 258: Performed by: OBSTETRICS & GYNECOLOGY

## 2020-02-17 PROCEDURE — 3600000019 HC SURGERY ROBOT ADDTL 15MIN: Performed by: OBSTETRICS & GYNECOLOGY

## 2020-02-17 PROCEDURE — 2580000003 HC RX 258: Performed by: STUDENT IN AN ORGANIZED HEALTH CARE EDUCATION/TRAINING PROGRAM

## 2020-02-17 PROCEDURE — 7100000000 HC PACU RECOVERY - FIRST 15 MIN: Performed by: OBSTETRICS & GYNECOLOGY

## 2020-02-17 PROCEDURE — 2500000003 HC RX 250 WO HCPCS: Performed by: OBSTETRICS & GYNECOLOGY

## 2020-02-17 PROCEDURE — 3600000009 HC SURGERY ROBOT BASE: Performed by: OBSTETRICS & GYNECOLOGY

## 2020-02-17 PROCEDURE — 6360000002 HC RX W HCPCS: Performed by: OBSTETRICS & GYNECOLOGY

## 2020-02-17 PROCEDURE — 6360000002 HC RX W HCPCS: Performed by: NURSE ANESTHETIST, CERTIFIED REGISTERED

## 2020-02-17 PROCEDURE — 88331 PATH CONSLTJ SURG 1 BLK 1SPC: CPT

## 2020-02-17 PROCEDURE — 81025 URINE PREGNANCY TEST: CPT

## 2020-02-17 PROCEDURE — 2500000003 HC RX 250 WO HCPCS: Performed by: STUDENT IN AN ORGANIZED HEALTH CARE EDUCATION/TRAINING PROGRAM

## 2020-02-17 PROCEDURE — 2720000010 HC SURG SUPPLY STERILE: Performed by: OBSTETRICS & GYNECOLOGY

## 2020-02-17 PROCEDURE — 88309 TISSUE EXAM BY PATHOLOGIST: CPT

## 2020-02-17 PROCEDURE — 2500000003 HC RX 250 WO HCPCS: Performed by: NURSE ANESTHETIST, CERTIFIED REGISTERED

## 2020-02-17 PROCEDURE — S2900 ROBOTIC SURGICAL SYSTEM: HCPCS | Performed by: OBSTETRICS & GYNECOLOGY

## 2020-02-17 PROCEDURE — 6370000000 HC RX 637 (ALT 250 FOR IP): Performed by: STUDENT IN AN ORGANIZED HEALTH CARE EDUCATION/TRAINING PROGRAM

## 2020-02-17 PROCEDURE — 88112 CYTOPATH CELL ENHANCE TECH: CPT

## 2020-02-17 PROCEDURE — 3700000000 HC ANESTHESIA ATTENDED CARE: Performed by: OBSTETRICS & GYNECOLOGY

## 2020-02-17 PROCEDURE — 87086 URINE CULTURE/COLONY COUNT: CPT

## 2020-02-17 PROCEDURE — 2580000003 HC RX 258: Performed by: NURSE ANESTHETIST, CERTIFIED REGISTERED

## 2020-02-17 PROCEDURE — 7100000001 HC PACU RECOVERY - ADDTL 15 MIN: Performed by: OBSTETRICS & GYNECOLOGY

## 2020-02-17 PROCEDURE — 6360000002 HC RX W HCPCS: Performed by: STUDENT IN AN ORGANIZED HEALTH CARE EDUCATION/TRAINING PROGRAM

## 2020-02-17 PROCEDURE — 6370000000 HC RX 637 (ALT 250 FOR IP): Performed by: OBSTETRICS & GYNECOLOGY

## 2020-02-17 PROCEDURE — 88332 PATH CONSLTJ SURG EA ADD BLK: CPT

## 2020-02-17 PROCEDURE — 88305 TISSUE EXAM BY PATHOLOGIST: CPT

## 2020-02-17 PROCEDURE — 2580000003 HC RX 258: Performed by: ANESTHESIOLOGY

## 2020-02-17 PROCEDURE — 3700000001 HC ADD 15 MINUTES (ANESTHESIA): Performed by: OBSTETRICS & GYNECOLOGY

## 2020-02-17 PROCEDURE — 58552 LAPARO-VAG HYST INCL T/O: CPT | Performed by: OBSTETRICS & GYNECOLOGY

## 2020-02-17 PROCEDURE — 6360000002 HC RX W HCPCS: Performed by: ANESTHESIOLOGY

## 2020-02-17 PROCEDURE — C1760 CLOSURE DEV, VASC: HCPCS | Performed by: OBSTETRICS & GYNECOLOGY

## 2020-02-17 PROCEDURE — 88342 IMHCHEM/IMCYTCHM 1ST ANTB: CPT

## 2020-02-17 PROCEDURE — 2709999900 HC NON-CHARGEABLE SUPPLY: Performed by: OBSTETRICS & GYNECOLOGY

## 2020-02-17 RX ORDER — KETOROLAC TROMETHAMINE 30 MG/ML
30 INJECTION, SOLUTION INTRAMUSCULAR; INTRAVENOUS EVERY 6 HOURS
Status: DISCONTINUED | OUTPATIENT
Start: 2020-02-17 | End: 2020-02-17

## 2020-02-17 RX ORDER — KETOROLAC TROMETHAMINE 30 MG/ML
30 INJECTION, SOLUTION INTRAMUSCULAR; INTRAVENOUS EVERY 6 HOURS
Status: DISCONTINUED | OUTPATIENT
Start: 2020-02-17 | End: 2020-02-18 | Stop reason: HOSPADM

## 2020-02-17 RX ORDER — ATORVASTATIN CALCIUM 20 MG/1
20 TABLET, FILM COATED ORAL DAILY
Status: DISCONTINUED | OUTPATIENT
Start: 2020-02-18 | End: 2020-02-18 | Stop reason: HOSPADM

## 2020-02-17 RX ORDER — BUPIVACAINE HYDROCHLORIDE 2.5 MG/ML
INJECTION, SOLUTION EPIDURAL; INFILTRATION; INTRACAUDAL PRN
Status: DISCONTINUED | OUTPATIENT
Start: 2020-02-17 | End: 2020-02-17 | Stop reason: ALTCHOICE

## 2020-02-17 RX ORDER — SODIUM CHLORIDE, SODIUM LACTATE, POTASSIUM CHLORIDE, CALCIUM CHLORIDE 600; 310; 30; 20 MG/100ML; MG/100ML; MG/100ML; MG/100ML
1000 INJECTION, SOLUTION INTRAVENOUS CONTINUOUS
Status: DISCONTINUED | OUTPATIENT
Start: 2020-02-17 | End: 2020-02-17

## 2020-02-17 RX ORDER — HYDROCODONE BITARTRATE AND ACETAMINOPHEN 5; 325 MG/1; MG/1
2 TABLET ORAL EVERY 4 HOURS PRN
Status: DISCONTINUED | OUTPATIENT
Start: 2020-02-17 | End: 2020-02-18 | Stop reason: HOSPADM

## 2020-02-17 RX ORDER — ACETAMINOPHEN 325 MG/1
650 TABLET ORAL EVERY 4 HOURS PRN
Status: DISCONTINUED | OUTPATIENT
Start: 2020-02-17 | End: 2020-02-18 | Stop reason: HOSPADM

## 2020-02-17 RX ORDER — KETOROLAC TROMETHAMINE 30 MG/ML
INJECTION, SOLUTION INTRAMUSCULAR; INTRAVENOUS PRN
Status: DISCONTINUED | OUTPATIENT
Start: 2020-02-17 | End: 2020-02-17 | Stop reason: SDUPTHER

## 2020-02-17 RX ORDER — GLYCOPYRROLATE 1 MG/5 ML
SYRINGE (ML) INTRAVENOUS PRN
Status: DISCONTINUED | OUTPATIENT
Start: 2020-02-17 | End: 2020-02-17 | Stop reason: SDUPTHER

## 2020-02-17 RX ORDER — FENTANYL CITRATE 50 UG/ML
50 INJECTION, SOLUTION INTRAMUSCULAR; INTRAVENOUS EVERY 5 MIN PRN
Status: COMPLETED | OUTPATIENT
Start: 2020-02-17 | End: 2020-02-17

## 2020-02-17 RX ORDER — SODIUM CHLORIDE 0.9 % (FLUSH) 0.9 %
10 SYRINGE (ML) INJECTION EVERY 12 HOURS SCHEDULED
Status: DISCONTINUED | OUTPATIENT
Start: 2020-02-17 | End: 2020-02-18 | Stop reason: HOSPADM

## 2020-02-17 RX ORDER — MIDAZOLAM HYDROCHLORIDE 1 MG/ML
INJECTION INTRAMUSCULAR; INTRAVENOUS PRN
Status: DISCONTINUED | OUTPATIENT
Start: 2020-02-17 | End: 2020-02-17 | Stop reason: SDUPTHER

## 2020-02-17 RX ORDER — DIPHENHYDRAMINE HYDROCHLORIDE 50 MG/ML
INJECTION INTRAMUSCULAR; INTRAVENOUS PRN
Status: DISCONTINUED | OUTPATIENT
Start: 2020-02-17 | End: 2020-02-17 | Stop reason: SDUPTHER

## 2020-02-17 RX ORDER — MIDAZOLAM HYDROCHLORIDE 1 MG/ML
2 INJECTION INTRAMUSCULAR; INTRAVENOUS ONCE
Status: COMPLETED | OUTPATIENT
Start: 2020-02-17 | End: 2020-02-17

## 2020-02-17 RX ORDER — SCOLOPAMINE TRANSDERMAL SYSTEM 1 MG/1
1 PATCH, EXTENDED RELEASE TRANSDERMAL
Status: DISCONTINUED | OUTPATIENT
Start: 2020-02-17 | End: 2020-02-18 | Stop reason: HOSPADM

## 2020-02-17 RX ORDER — HYDROCODONE BITARTRATE AND ACETAMINOPHEN 5; 325 MG/1; MG/1
1 TABLET ORAL EVERY 4 HOURS PRN
Status: DISCONTINUED | OUTPATIENT
Start: 2020-02-17 | End: 2020-02-18 | Stop reason: HOSPADM

## 2020-02-17 RX ORDER — SODIUM CHLORIDE 0.9 % (FLUSH) 0.9 %
10 SYRINGE (ML) INJECTION PRN
Status: DISCONTINUED | OUTPATIENT
Start: 2020-02-17 | End: 2020-02-18 | Stop reason: HOSPADM

## 2020-02-17 RX ORDER — SIMETHICONE 80 MG
80 TABLET,CHEWABLE ORAL EVERY 6 HOURS
Status: DISCONTINUED | OUTPATIENT
Start: 2020-02-17 | End: 2020-02-18 | Stop reason: HOSPADM

## 2020-02-17 RX ORDER — ONDANSETRON 2 MG/ML
INJECTION INTRAMUSCULAR; INTRAVENOUS PRN
Status: DISCONTINUED | OUTPATIENT
Start: 2020-02-17 | End: 2020-02-17 | Stop reason: SDUPTHER

## 2020-02-17 RX ORDER — ACETAMINOPHEN 500 MG
1000 TABLET ORAL ONCE
Status: COMPLETED | OUTPATIENT
Start: 2020-02-17 | End: 2020-02-17

## 2020-02-17 RX ORDER — ONDANSETRON 2 MG/ML
4 INJECTION INTRAMUSCULAR; INTRAVENOUS ONCE
Status: COMPLETED | OUTPATIENT
Start: 2020-02-17 | End: 2020-02-17

## 2020-02-17 RX ORDER — DEXTROSE, SODIUM CHLORIDE, AND POTASSIUM CHLORIDE 5; .45; .15 G/100ML; G/100ML; G/100ML
INJECTION INTRAVENOUS CONTINUOUS
Status: DISCONTINUED | OUTPATIENT
Start: 2020-02-17 | End: 2020-02-18 | Stop reason: HOSPADM

## 2020-02-17 RX ORDER — ONDANSETRON 2 MG/ML
4 INJECTION INTRAMUSCULAR; INTRAVENOUS EVERY 6 HOURS PRN
Status: DISCONTINUED | OUTPATIENT
Start: 2020-02-17 | End: 2020-02-18 | Stop reason: HOSPADM

## 2020-02-17 RX ORDER — SODIUM CHLORIDE, SODIUM LACTATE, POTASSIUM CHLORIDE, CALCIUM CHLORIDE 600; 310; 30; 20 MG/100ML; MG/100ML; MG/100ML; MG/100ML
INJECTION, SOLUTION INTRAVENOUS CONTINUOUS PRN
Status: DISCONTINUED | OUTPATIENT
Start: 2020-02-17 | End: 2020-02-17 | Stop reason: SDUPTHER

## 2020-02-17 RX ORDER — NEOSTIGMINE METHYLSULFATE 5 MG/5 ML
SYRINGE (ML) INTRAVENOUS PRN
Status: DISCONTINUED | OUTPATIENT
Start: 2020-02-17 | End: 2020-02-17 | Stop reason: SDUPTHER

## 2020-02-17 RX ORDER — SODIUM CHLORIDE 9 MG/ML
INJECTION, SOLUTION INTRAVENOUS CONTINUOUS
Status: DISCONTINUED | OUTPATIENT
Start: 2020-02-17 | End: 2020-02-17

## 2020-02-17 RX ORDER — FENTANYL CITRATE 50 UG/ML
INJECTION, SOLUTION INTRAMUSCULAR; INTRAVENOUS PRN
Status: DISCONTINUED | OUTPATIENT
Start: 2020-02-17 | End: 2020-02-17 | Stop reason: SDUPTHER

## 2020-02-17 RX ORDER — METOPROLOL TARTRATE 5 MG/5ML
INJECTION INTRAVENOUS PRN
Status: DISCONTINUED | OUTPATIENT
Start: 2020-02-17 | End: 2020-02-17 | Stop reason: SDUPTHER

## 2020-02-17 RX ORDER — PROMETHAZINE HYDROCHLORIDE 25 MG/ML
12.5 INJECTION, SOLUTION INTRAMUSCULAR; INTRAVENOUS EVERY 4 HOURS PRN
Status: DISCONTINUED | OUTPATIENT
Start: 2020-02-17 | End: 2020-02-18 | Stop reason: HOSPADM

## 2020-02-17 RX ORDER — SODIUM CHLORIDE 0.9 % (FLUSH) 0.9 %
10 SYRINGE (ML) INJECTION EVERY 12 HOURS SCHEDULED
Status: DISCONTINUED | OUTPATIENT
Start: 2020-02-17 | End: 2020-02-17 | Stop reason: HOSPADM

## 2020-02-17 RX ORDER — ALBUTEROL SULFATE 90 UG/1
2 AEROSOL, METERED RESPIRATORY (INHALATION) EVERY 4 HOURS PRN
Status: DISCONTINUED | OUTPATIENT
Start: 2020-02-17 | End: 2020-02-18 | Stop reason: HOSPADM

## 2020-02-17 RX ORDER — ROCURONIUM BROMIDE 10 MG/ML
INJECTION, SOLUTION INTRAVENOUS PRN
Status: DISCONTINUED | OUTPATIENT
Start: 2020-02-17 | End: 2020-02-17 | Stop reason: SDUPTHER

## 2020-02-17 RX ORDER — SODIUM CHLORIDE 0.9 % (FLUSH) 0.9 %
10 SYRINGE (ML) INJECTION PRN
Status: DISCONTINUED | OUTPATIENT
Start: 2020-02-17 | End: 2020-02-17 | Stop reason: HOSPADM

## 2020-02-17 RX ORDER — LIDOCAINE HYDROCHLORIDE 10 MG/ML
INJECTION, SOLUTION EPIDURAL; INFILTRATION; INTRACAUDAL; PERINEURAL PRN
Status: DISCONTINUED | OUTPATIENT
Start: 2020-02-17 | End: 2020-02-17 | Stop reason: SDUPTHER

## 2020-02-17 RX ORDER — ULTRASOUND COUPLING MEDIUM
GEL (GRAM) TOPICAL PRN
Status: DISCONTINUED | OUTPATIENT
Start: 2020-02-17 | End: 2020-02-17 | Stop reason: ALTCHOICE

## 2020-02-17 RX ORDER — MAGNESIUM HYDROXIDE 1200 MG/15ML
LIQUID ORAL CONTINUOUS PRN
Status: COMPLETED | OUTPATIENT
Start: 2020-02-17 | End: 2020-02-17

## 2020-02-17 RX ORDER — PROPOFOL 10 MG/ML
INJECTION, EMULSION INTRAVENOUS PRN
Status: DISCONTINUED | OUTPATIENT
Start: 2020-02-17 | End: 2020-02-17 | Stop reason: SDUPTHER

## 2020-02-17 RX ORDER — ZOLPIDEM TARTRATE 5 MG/1
5 TABLET ORAL NIGHTLY PRN
Status: DISCONTINUED | OUTPATIENT
Start: 2020-02-17 | End: 2020-02-18 | Stop reason: HOSPADM

## 2020-02-17 RX ORDER — FENTANYL CITRATE 50 UG/ML
25 INJECTION, SOLUTION INTRAMUSCULAR; INTRAVENOUS EVERY 5 MIN PRN
Status: DISCONTINUED | OUTPATIENT
Start: 2020-02-17 | End: 2020-02-17 | Stop reason: HOSPADM

## 2020-02-17 RX ORDER — DOCUSATE SODIUM 100 MG/1
100 CAPSULE, LIQUID FILLED ORAL 2 TIMES DAILY
Status: DISCONTINUED | OUTPATIENT
Start: 2020-02-17 | End: 2020-02-18 | Stop reason: HOSPADM

## 2020-02-17 RX ADMIN — ACETAMINOPHEN 1000 MG: 500 TABLET ORAL at 06:59

## 2020-02-17 RX ADMIN — ROCURONIUM BROMIDE 50 MG: 10 INJECTION INTRAVENOUS at 07:27

## 2020-02-17 RX ADMIN — FENTANYL CITRATE 50 MCG: 50 INJECTION, SOLUTION INTRAMUSCULAR; INTRAVENOUS at 11:45

## 2020-02-17 RX ADMIN — DOCUSATE SODIUM 100 MG: 100 CAPSULE, LIQUID FILLED ORAL at 21:27

## 2020-02-17 RX ADMIN — FENTANYL CITRATE 50 MCG: 50 INJECTION, SOLUTION INTRAMUSCULAR; INTRAVENOUS at 11:40

## 2020-02-17 RX ADMIN — Medication 10 ML: at 21:28

## 2020-02-17 RX ADMIN — Medication 12.5 MG: at 07:49

## 2020-02-17 RX ADMIN — FENTANYL CITRATE 50 MCG: 50 INJECTION, SOLUTION INTRAMUSCULAR; INTRAVENOUS at 11:30

## 2020-02-17 RX ADMIN — METOPROLOL TARTRATE 3 MG: 1 INJECTION, SOLUTION INTRAVENOUS at 08:13

## 2020-02-17 RX ADMIN — FENTANYL CITRATE 100 MCG: 50 INJECTION, SOLUTION INTRAMUSCULAR; INTRAVENOUS at 07:27

## 2020-02-17 RX ADMIN — MIDAZOLAM HYDROCHLORIDE 2 MG: 1 INJECTION, SOLUTION INTRAMUSCULAR; INTRAVENOUS at 07:26

## 2020-02-17 RX ADMIN — FENTANYL CITRATE 50 MCG: 50 INJECTION, SOLUTION INTRAMUSCULAR; INTRAVENOUS at 08:13

## 2020-02-17 RX ADMIN — FENTANYL CITRATE 50 MCG: 50 INJECTION, SOLUTION INTRAMUSCULAR; INTRAVENOUS at 10:55

## 2020-02-17 RX ADMIN — SODIUM CHLORIDE, POTASSIUM CHLORIDE, SODIUM LACTATE AND CALCIUM CHLORIDE 1000 ML: 600; 310; 30; 20 INJECTION, SOLUTION INTRAVENOUS at 06:53

## 2020-02-17 RX ADMIN — FLUORESCEIN SODIUM 0.5 ML: 100 INJECTION, SOLUTION OPHTHALMIC at 10:26

## 2020-02-17 RX ADMIN — HYDROMORPHONE HYDROCHLORIDE 0.5 MG: 1 INJECTION, SOLUTION INTRAMUSCULAR; INTRAVENOUS; SUBCUTANEOUS at 11:55

## 2020-02-17 RX ADMIN — Medication 3.5 MG: at 10:36

## 2020-02-17 RX ADMIN — SIMETHICONE 80 MG: 80 TABLET, CHEWABLE ORAL at 21:27

## 2020-02-17 RX ADMIN — PHENOL 1 SPRAY: 1.5 LIQUID ORAL at 14:37

## 2020-02-17 RX ADMIN — KETOROLAC TROMETHAMINE 30 MG: 30 INJECTION, SOLUTION INTRAMUSCULAR; INTRAVENOUS at 23:00

## 2020-02-17 RX ADMIN — LIDOCAINE HYDROCHLORIDE 50 MG: 10 INJECTION, SOLUTION EPIDURAL; INFILTRATION; INTRACAUDAL; PERINEURAL at 07:27

## 2020-02-17 RX ADMIN — FENTANYL CITRATE 50 MCG: 50 INJECTION, SOLUTION INTRAMUSCULAR; INTRAVENOUS at 11:25

## 2020-02-17 RX ADMIN — ENOXAPARIN SODIUM 40 MG: 40 INJECTION SUBCUTANEOUS at 06:59

## 2020-02-17 RX ADMIN — SODIUM CHLORIDE, POTASSIUM CHLORIDE, SODIUM LACTATE AND CALCIUM CHLORIDE: 600; 310; 30; 20 INJECTION, SOLUTION INTRAVENOUS at 07:31

## 2020-02-17 RX ADMIN — PROPOFOL 150 MG: 10 INJECTION, EMULSION INTRAVENOUS at 07:27

## 2020-02-17 RX ADMIN — POTASSIUM CHLORIDE, DEXTROSE MONOHYDRATE AND SODIUM CHLORIDE: 150; 5; 450 INJECTION, SOLUTION INTRAVENOUS at 13:17

## 2020-02-17 RX ADMIN — FENTANYL CITRATE 100 MCG: 50 INJECTION, SOLUTION INTRAMUSCULAR; INTRAVENOUS at 08:00

## 2020-02-17 RX ADMIN — HYDROMORPHONE HYDROCHLORIDE 0.5 MG: 1 INJECTION, SOLUTION INTRAMUSCULAR; INTRAVENOUS; SUBCUTANEOUS at 13:17

## 2020-02-17 RX ADMIN — FENTANYL CITRATE 50 MCG: 50 INJECTION, SOLUTION INTRAMUSCULAR; INTRAVENOUS at 10:46

## 2020-02-17 RX ADMIN — PROPOFOL 50 MG: 10 INJECTION, EMULSION INTRAVENOUS at 08:14

## 2020-02-17 RX ADMIN — Medication 0.7 MG: at 10:36

## 2020-02-17 RX ADMIN — SIMETHICONE 80 MG: 80 TABLET, CHEWABLE ORAL at 13:22

## 2020-02-17 RX ADMIN — KETOROLAC TROMETHAMINE 30 MG: 30 INJECTION, SOLUTION INTRAMUSCULAR at 10:55

## 2020-02-17 RX ADMIN — METOPROLOL TARTRATE 2 MG: 1 INJECTION, SOLUTION INTRAVENOUS at 09:28

## 2020-02-17 RX ADMIN — KETOROLAC TROMETHAMINE 30 MG: 30 INJECTION, SOLUTION INTRAMUSCULAR; INTRAVENOUS at 17:01

## 2020-02-17 RX ADMIN — SODIUM CHLORIDE, POTASSIUM CHLORIDE, SODIUM LACTATE AND CALCIUM CHLORIDE: 600; 310; 30; 20 INJECTION, SOLUTION INTRAVENOUS at 10:38

## 2020-02-17 RX ADMIN — HYDROMORPHONE HYDROCHLORIDE 0.5 MG: 1 INJECTION, SOLUTION INTRAMUSCULAR; INTRAVENOUS; SUBCUTANEOUS at 12:00

## 2020-02-17 RX ADMIN — HYDROMORPHONE HYDROCHLORIDE 0.5 MG: 1 INJECTION, SOLUTION INTRAMUSCULAR; INTRAVENOUS; SUBCUTANEOUS at 18:51

## 2020-02-17 RX ADMIN — MIDAZOLAM HYDROCHLORIDE 2 MG: 1 INJECTION, SOLUTION INTRAMUSCULAR; INTRAVENOUS at 07:09

## 2020-02-17 RX ADMIN — HYDROMORPHONE HYDROCHLORIDE 0.5 MG: 1 INJECTION, SOLUTION INTRAMUSCULAR; INTRAVENOUS; SUBCUTANEOUS at 12:10

## 2020-02-17 RX ADMIN — HYDROCODONE BITARTRATE AND ACETAMINOPHEN 2 TABLET: 5; 325 TABLET ORAL at 14:37

## 2020-02-17 RX ADMIN — ONDANSETRON 4 MG: 2 INJECTION, SOLUTION INTRAMUSCULAR; INTRAVENOUS at 10:43

## 2020-02-17 RX ADMIN — CEFAZOLIN 2 G: 10 INJECTION, POWDER, FOR SOLUTION INTRAVENOUS at 07:45

## 2020-02-17 RX ADMIN — ONDANSETRON 4 MG: 2 INJECTION INTRAMUSCULAR; INTRAVENOUS at 06:59

## 2020-02-17 RX ADMIN — HYDROMORPHONE HYDROCHLORIDE 0.5 MG: 1 INJECTION, SOLUTION INTRAMUSCULAR; INTRAVENOUS; SUBCUTANEOUS at 12:05

## 2020-02-17 ASSESSMENT — PULMONARY FUNCTION TESTS
PIF_VALUE: 22
PIF_VALUE: 22
PIF_VALUE: 13
PIF_VALUE: 29
PIF_VALUE: 30
PIF_VALUE: 28
PIF_VALUE: 17
PIF_VALUE: 15
PIF_VALUE: 23
PIF_VALUE: 30
PIF_VALUE: 29
PIF_VALUE: 29
PIF_VALUE: 30
PIF_VALUE: 17
PIF_VALUE: 28
PIF_VALUE: 23
PIF_VALUE: 28
PIF_VALUE: 30
PIF_VALUE: 29
PIF_VALUE: 3
PIF_VALUE: 29
PIF_VALUE: 28
PIF_VALUE: 15
PIF_VALUE: 29
PIF_VALUE: 30
PIF_VALUE: 30
PIF_VALUE: 15
PIF_VALUE: 18
PIF_VALUE: 28
PIF_VALUE: 28
PIF_VALUE: 14
PIF_VALUE: 15
PIF_VALUE: 16
PIF_VALUE: 12
PIF_VALUE: 23
PIF_VALUE: 28
PIF_VALUE: 29
PIF_VALUE: 1
PIF_VALUE: 29
PIF_VALUE: 29
PIF_VALUE: 27
PIF_VALUE: 16
PIF_VALUE: 29
PIF_VALUE: 29
PIF_VALUE: 30
PIF_VALUE: 29
PIF_VALUE: 29
PIF_VALUE: 15
PIF_VALUE: 22
PIF_VALUE: 15
PIF_VALUE: 1
PIF_VALUE: 15
PIF_VALUE: 27
PIF_VALUE: 30
PIF_VALUE: 29
PIF_VALUE: 29
PIF_VALUE: 22
PIF_VALUE: 3
PIF_VALUE: 15
PIF_VALUE: 14
PIF_VALUE: 30
PIF_VALUE: 29
PIF_VALUE: 22
PIF_VALUE: 29
PIF_VALUE: 28
PIF_VALUE: 23
PIF_VALUE: 14
PIF_VALUE: 29
PIF_VALUE: 28
PIF_VALUE: 29
PIF_VALUE: 27
PIF_VALUE: 29
PIF_VALUE: 30
PIF_VALUE: 23
PIF_VALUE: 27
PIF_VALUE: 14
PIF_VALUE: 28
PIF_VALUE: 30
PIF_VALUE: 28
PIF_VALUE: 22
PIF_VALUE: 16
PIF_VALUE: 23
PIF_VALUE: 15
PIF_VALUE: 29
PIF_VALUE: 23
PIF_VALUE: 1
PIF_VALUE: 28
PIF_VALUE: 15
PIF_VALUE: 12
PIF_VALUE: 29
PIF_VALUE: 6
PIF_VALUE: 16
PIF_VALUE: 5
PIF_VALUE: 15
PIF_VALUE: 29
PIF_VALUE: 29
PIF_VALUE: 28
PIF_VALUE: 29
PIF_VALUE: 30
PIF_VALUE: 15
PIF_VALUE: 30
PIF_VALUE: 29
PIF_VALUE: 1
PIF_VALUE: 28
PIF_VALUE: 26
PIF_VALUE: 29
PIF_VALUE: 29
PIF_VALUE: 15
PIF_VALUE: 29
PIF_VALUE: 28
PIF_VALUE: 29
PIF_VALUE: 21
PIF_VALUE: 15
PIF_VALUE: 29
PIF_VALUE: 15
PIF_VALUE: 28
PIF_VALUE: 28
PIF_VALUE: 29
PIF_VALUE: 22
PIF_VALUE: 29
PIF_VALUE: 1
PIF_VALUE: 14
PIF_VALUE: 30
PIF_VALUE: 22
PIF_VALUE: 15
PIF_VALUE: 29
PIF_VALUE: 27
PIF_VALUE: 16
PIF_VALUE: 28
PIF_VALUE: 29
PIF_VALUE: 22
PIF_VALUE: 15
PIF_VALUE: 29
PIF_VALUE: 6
PIF_VALUE: 28
PIF_VALUE: 29
PIF_VALUE: 15
PIF_VALUE: 15
PIF_VALUE: 16
PIF_VALUE: 24
PIF_VALUE: 29
PIF_VALUE: 29
PIF_VALUE: 28
PIF_VALUE: 29
PIF_VALUE: 1
PIF_VALUE: 28
PIF_VALUE: 27
PIF_VALUE: 18
PIF_VALUE: 29
PIF_VALUE: 30
PIF_VALUE: 29
PIF_VALUE: 23
PIF_VALUE: 15
PIF_VALUE: 29
PIF_VALUE: 29
PIF_VALUE: 28
PIF_VALUE: 16
PIF_VALUE: 29
PIF_VALUE: 30
PIF_VALUE: 20
PIF_VALUE: 29
PIF_VALUE: 24
PIF_VALUE: 28
PIF_VALUE: 19
PIF_VALUE: 30
PIF_VALUE: 1
PIF_VALUE: 28
PIF_VALUE: 29
PIF_VALUE: 28
PIF_VALUE: 28
PIF_VALUE: 17
PIF_VALUE: 23
PIF_VALUE: 29
PIF_VALUE: 28
PIF_VALUE: 3
PIF_VALUE: 29
PIF_VALUE: 16
PIF_VALUE: 23
PIF_VALUE: 28
PIF_VALUE: 28
PIF_VALUE: 29
PIF_VALUE: 29
PIF_VALUE: 24
PIF_VALUE: 1
PIF_VALUE: 28
PIF_VALUE: 30
PIF_VALUE: 15
PIF_VALUE: 22
PIF_VALUE: 29
PIF_VALUE: 23
PIF_VALUE: 28
PIF_VALUE: 4
PIF_VALUE: 28
PIF_VALUE: 24
PIF_VALUE: 22
PIF_VALUE: 29
PIF_VALUE: 29
PIF_VALUE: 23
PIF_VALUE: 28
PIF_VALUE: 15
PIF_VALUE: 15
PIF_VALUE: 14
PIF_VALUE: 28
PIF_VALUE: 27
PIF_VALUE: 27
PIF_VALUE: 29
PIF_VALUE: 29
PIF_VALUE: 4
PIF_VALUE: 15
PIF_VALUE: 23
PIF_VALUE: 29
PIF_VALUE: 27

## 2020-02-17 ASSESSMENT — PAIN SCALES - GENERAL
PAINLEVEL_OUTOF10: 8
PAINLEVEL_OUTOF10: 7
PAINLEVEL_OUTOF10: 7
PAINLEVEL_OUTOF10: 8
PAINLEVEL_OUTOF10: 0
PAINLEVEL_OUTOF10: 8
PAINLEVEL_OUTOF10: 6
PAINLEVEL_OUTOF10: 9
PAINLEVEL_OUTOF10: 8
PAINLEVEL_OUTOF10: 6
PAINLEVEL_OUTOF10: 8
PAINLEVEL_OUTOF10: 6
PAINLEVEL_OUTOF10: 6
PAINLEVEL_OUTOF10: 7
PAINLEVEL_OUTOF10: 8

## 2020-02-17 ASSESSMENT — PAIN DESCRIPTION - PAIN TYPE: TYPE: SURGICAL PAIN

## 2020-02-17 ASSESSMENT — PAIN - FUNCTIONAL ASSESSMENT: PAIN_FUNCTIONAL_ASSESSMENT: 0-10

## 2020-02-17 ASSESSMENT — ENCOUNTER SYMPTOMS: SHORTNESS OF BREATH: 0

## 2020-02-17 ASSESSMENT — PAIN DESCRIPTION - LOCATION: LOCATION: ABDOMEN

## 2020-02-17 NOTE — ANESTHESIA PRE PROCEDURE
Department of Anesthesiology  Preprocedure Note       Name:  Terry Oliver   Age:  42 y.o.  :  1980                                          MRN:  [id number]         Date:  2020      Surgeon: Colleen Yen):  Fadi Falk MD    Procedure: LAPAROSCOPIC XI ROBOTIC TOTAL ABDOMINAL HYSTERECTOMY, BILATERAL SALPINGECTOMY, CYTOLOGIC WASHINGS, POSSIBLE EXPLORATORY LAPAROTOMY, POSSIBLE OOPHORECTOMY (N/A )    Medications prior to admission:   Prior to Admission medications    Medication Sig Start Date End Date Taking? Authorizing Provider   Cholecalciferol (RA VITAMIN D-3) 50 MCG (2000) CAPS take 1 capsule by mouth once daily 2/10/20  Yes Desire Gómez PA-C   Aspirin-Acetaminophen-Caffeine (EXCEDRIN EXTRA STRENGTH PO) Take by mouth as needed Stopped 2020   Yes Historical Provider, MD   azelastine (OPTIVAR) 0.05 % ophthalmic solution 1 drop as needed   Yes Historical Provider, MD   albuterol sulfate  (90 Base) MCG/ACT inhaler as needed  20  Yes Historical Provider, MD   atorvastatin (LIPITOR) 20 MG tablet take 1 tablet by mouth once daily 20  Yes Bereket Kaiser MD   norgestrel-ethinyl estradiol (CRYSELLE-28) 0.3-30 MG-MCG per tablet Take 1 tablet by mouth daily 19  Yes FRANDY Ramsay - CNP   tiZANidine (ZANAFLEX) 4 MG tablet Take 4 mg by mouth every 8 hours as needed   Yes Historical Provider, MD   lidocaine (XYLOCAINE) 5 % ointment Apply topically as needed.  19  Yes Chris Mackey MD   fluticasone (FLONASE) 50 MCG/ACT nasal spray 2 sprays by Nasal route daily as needed  17  Yes Historical Provider, MD   diphenhydrAMINE (BENADRYL) 25 MG tablet Take 25 mg by mouth every 6 hours as needed for Itching   Yes Historical Provider, MD   Acetaminophen (TYLENOL EXTRA STRENGTH PO) Take 2 tablets by mouth as needed    Historical Provider, MD   omeprazole (PRILOSEC) 20 MG delayed release capsule Take 1 capsule by mouth Daily  Patient taking differently: Take 20 mg  Family planning Z30.09    Murmur R01.1    Seasonal allergies J30.2    Elevated WBC count D72.829    Abnormal TSH R79.89    Hyperlipidemia E78.5    Vitamin D deficiency E55.9    Headache R51    Need for pneumococcal vaccination Z23    Enlarged tonsils J35.1    Generalized abdominal pain R10.84    Abnormal ultrasound of gallbladder R93.2    Nausea & vomiting R11.2    Rectal bleeding K62.5    Colitis K52.9    BMI 35.0-35.9,adult Z68.35    S/P laparoscopic cholecystectomy Z90.49    Symptomatic cholelithiasis K80.20    Post-operative nausea and vomiting R11.2, Z98.890    Hypothyroidism E03.9    Adenocarcinoma in situ (AIS) of uterine cervix D06.9    Mild mitral regurgitation I34.0    Mild left atrial enlargement I51.7       Past Medical History:        Diagnosis Date    Allergic rhinitis     Aortic stenosis     SEES DR Ida Junior (AS since birth-used to follow with peds cardiology)    Arthritis     Asthma     GERD (gastroesophageal reflux disease)     Headache     Heart murmur     History of echocardiogram 2016    Hyperlipidemia 9/15/2015    Low blood sugar     Movement disorder     Neuropathy     left LE with numbness    Numbness and tingling     HANDS AND FEET/SEES DR. Annabel ramirez ([facility] [address]     last time 11/2019    Prolonged emergence from general anesthesia     Psoriasis     Pulmonary stenosis     Ringing in ears     SEES AN ENT    Sacroiliac joint dysfunction of both sides 06/18/2019    Sacroiliitis ([facility] [address] 06/18/2019    Wears glasses        Past Surgical History:        Procedure Laterality Date    CHOLECYSTECTOMY, LAPAROSCOPIC  04/27/2018    COLONOSCOPY  01/08/2018    COLON, RANDOM BIOPSIES: MILD FOCAL ACTIVE COLITIS, HEMORRHOIDS    COLPOSCOPY  12/20/2019    Dr. Bib Reddy, COLON, DIAGNOSTIC  01/08/2018    EGD and Colonoscopy    LEEP  2008    NERVE BLOCK  06/2019    NERVE BLOCK  07/2019    OK COLONOSCOPY W/BIOPSY SINGLE/MULTIPLE N/A 1/8/2018 GFRAA >60 02/05/2020    LABGLOM >60 02/05/2020    GLUCOSE 82 02/05/2020    PROT 7.9 02/05/2020    CALCIUM 9.1 02/05/2020    BILITOT 0.74 02/05/2020    ALKPHOS 57 02/05/2020    AST 15 02/05/2020    ALT 9 02/05/2020       POC Tests: No results for input(s): POCGLU, POCNA, POCK, POCCL, POCBUN, POCHEMO, POCHCT in the last 72 hours. Coags:   Lab Results   Component Value Date    PROTIME 11.3 04/27/2018    INR 0.9 04/27/2018       HCG (If Applicable):   Lab Results   Component Value Date    PREGTESTUR NEGATIVE 02/05/2020    HCG NEGATIVE 04/27/2018        ABGs: No results found for: PHART, PO2ART, NHF4NHV, FOY2ESL, BEART, U2PICKTH     Type & Screen (If Applicable):  No results found for: LABABO, LABRH    Anesthesia Evaluation     history of anesthetic complications: PONV. Airway: Mallampati: I     Neck ROM: full   Dental: normal exam         Pulmonary: breath sounds clear to auscultation  (+) asthma:     (-) shortness of breath, recent URI and sleep apnea                           Cardiovascular:    (+) valvular problems/murmurs:,           Rate: normal                 ROS comment: Normal left ventricle size, wall thickness and function with an estimated EF  > 55%. No significant valvular regurgitation or stenosis seen. Neuro/Psych:   (+) seizures:, headaches:,             GI/Hepatic/Renal:   (+) GERD:,           Endo/Other:    (+) hypothyroidism: arthritis:., .                 Abdominal:   (+) obese,         Vascular: negative vascular ROS.  + PVD, aortic or cerebral, . Anesthesia Plan      general     ASA 3       Induction: intravenous. MIPS: Prophylactic antiemetics administered. Anesthetic plan and risks discussed with patient. Use of blood products discussed with patient whom consented to blood products. Plan discussed with CRNA.                   Armida Garcias MD   2/17/2020

## 2020-02-17 NOTE — TELEPHONE ENCOUNTER
[employer] [facility]   Hereditary Cancer Risk Assessment     Name: Staci Maurice  YOB: 1980  Date of Results Disclosure: 2/17/19    HISTORY   Ms. Wayne Maurice was seen for genetic counseling on 1/28/20 at the request of LINDSAY Grubbs due to her family history of cancer. At that time, Ms. Wayne Maurice chose to pursue genetic testing via the CanceriFormularyt Expanded +RNA gene panel. A summary of Ms. Simpson's results and recommendations are below. RESULTS  [facility] [employer] CancerNext-Expanded Panel + RNAinsight: NEGATIVE - NO CLINICALLY SIGNIFICANT MUTATIONS DETECTED   This panel included the analysis of 67 genes associated with hereditary cancer including: AIP, ALK, APC, JESSE, BAP1, BARD1, BLM, BMPR1A, BRCA1, BRCA2, BRIP1, CDH1, CDK4, CDKN1B, CDKN2A, CHEK2, DICER1, EPCAM, FANCC, FH, FLCN, GALNT12, GREM1, HOXB13, MAX, MEN1, MET, MITF, MLH1, MRE11A, MSH2, MSH6, MUTYH, NBN, NF1, NF2, PALB2, PHOX2B, PMS2, POLD1, POLE, POT1, VAKHV1R, PTCH1, PTEN, RAD50, RAD51C, RAD51D, RB1, RET, SDHA, SDHAF2, SDHB, SDHC, ,SDHD, SMAD4, SMARCA4, SMARCB1, SMARCE1, STK11, SUFU, ZDUO984, TP53, TSC1, TSC2, VHL, and XRCC2. In addition, no clinically relevant aberrant RNA transcripts were detected in select genes. Please refer to genetic test report for technical details. Additional findings: VARIANT OF UNCERTAIN SIGNIFICANCE - POLE [id number]   A variant of uncertain significance (VUS) occurs when the laboratory does not have enough data to determine whether the genetic variant is benign (not associated with cancer) or pathogenic (associated with cancer). Because the significance of the POLE gene VUS is unknown, medical management must be based on Ms. Simpson's personal history and family history of cancer. We discussed that Ms. Simpson's negative test result greatly reduces the likelihood that she carries a hereditary gene mutation.  However, it is possible that her family history of cancer is due to a hereditary mutation which she did not inherit. It is also possible that her family history of cancer may be due to a gene for which testing was not performed or which has yet to be discovered. RECOMMENDATIONS  While Ms. Guevara Scott does not carry a known hereditary gene mutation, her risk for breast cancer may still be elevated due to her remaining family history of breast cancer. This includes her maternal aunt with breast cancer in her 45s. Based on Ms. Simpson's personal risk factors and family history of breast cancer, her estimated lifetime risk for breast cancer is 20% according to the [employer] risk model. The [employer] (NCCN) recommends that women with a lifetime risk of breast cancer above 20% consider the following screening and risk reducing options:     NCCN Recommendation Age to Begin Frequency    Breast awareness - Women should be familiar with their breasts and promptly report changes to their healthcare provider. Periodic, consistent breast self-examination (BSE) may be beneficial  Individualized  N/A    Clinical Breast Examination  Individualized  Every 6-12 months   Breast MRI with contrast  2740 years old Annual   Mammogram (consider tomosynthesis)  31-43 years old  Annual    Consider risk reducing agents (i.e. Tamoxifen)  Individualized  N/A    *age to begin screening is based on the onset of breast cancer in Ms. Simpson's family    Ms. Guevara Scott should continue general population cancer screening guidelines as directed by her physicians. RECOMMENDATIONS FOR FAMILY MEMBERS   1) It is possible that the other cancers in Ms. Simpson's family are due to a hereditary gene mutation that she did not inherit. Therefore, her siblings, mother, or maternal relatives may consider genetic counseling and testing to clarify this possibility.  Relatives may contact the [address] [place] [address] [employer] at [phone] or locate a genetic

## 2020-02-17 NOTE — PROGRESS NOTES
Gynecology Progress Note      Rocío Brochure. Onesimo Santos is a 42 y.o. female , POD #0 s/p LAPAROSCOPIC XI ROBOTIC TOTAL HYSTERECTOMY, BILATERAL SALPINGECTOMY, CYTOLOGIC WASHINGS, CYSTOSCOPY. Biopsy of lesion vesicouterine peritoneum. Patient seen and examined. Patient is resting comfortably in bed. Pain is somewhat controlled. Patient is  tolerating oral intake. She is urinating well into her sauceda. She denies any vaginal bleeding. She is not ambulating since surgery. She is passing minimal flatus. She denies Fever/Chills, Chest Pain, SOB, N/V, Calf Pain. Vitals:  Vitals:    20 1200 20 1215 20 1230 20 1245   BP: (!) 141/80 138/75 138/75 130/81   Pulse: 82 82 78 89   Resp: 14 13 13 16   Temp:   97.7 °F (36.5 °C) 98.5 °F (36.9 °C)   TempSrc:    Oral   SpO2: 97% 98% 99% 94%   Weight:       Height:             Intake/Output:   Last Shift: No intake/output data recorded. Current Shift: I/O this shift:  In: 2300 [I.V.:2300]  Out: 315 [Urine:300; Blood:15]    200 clr UOP/ 1.5 hr    Physical Exam:  General:  no apparent distress, alert and cooperative  Neurologic:  alert, oriented, normal speech, no focal findings or movement disorder noted  Lungs:  No increased work of breathing, good air exchange, clear to auscultation bilaterally, no crackles or wheezing  Heart:  regular rate and rhythm and no murmur    Abdomen: soft, non-distended, appropriate tenderness, no CVA tenderness, hypoactive bowel sounds  Incision: clean, dry and intact x  5 with dermabond in place  Extremities:  no calf tenderness, non edematous, SCDs on and functioning    Lab:  CBC, BMP pending tomorrow AM    Assessment/Plan:  Rocío Dunawaychmaria isabel. Superior 42 y.o. female [id number], POD #0 s/p LAPAROSCOPIC XI ROBOTIC TOTAL HYSTERECTOMY, BILATERAL SALPINGECTOMY, CYTOLOGIC WASHINGS, CYSTOSCOPY. Biopsy of lesion vesicouterine peritoneum.   - Doing well, vitals stable   - continue sauceda catheter, UOP good.  Remove @ 0200 if UOP from [id number]

## 2020-02-17 NOTE — H&P
laparoscopic (04/27/2018); pr lap,cholecystectomy (N/A, 4/27/2018); Nerve Block (06/2019); Nerve Block (07/2019); and Colposcopy (12/20/2019). ALLERGIES:  Allergies as of 01/23/2020 - Review Complete 01/23/2020   Allergen Reaction Noted    Decadron [dexamethasone] Shortness Of Breath 08/29/2017    Food Hives 05/14/2015    Bactrim [sulfamethoxazole-trimethoprim] Hives 05/05/2015    Bee venom Swelling 04/25/2019    Benzocaine      Cortisone  08/22/2019    Prednisone  08/22/2019    Seasonal  05/05/2015    Sulfamethoxazole-trimethoprim Hives 05/05/2015    Tetanus toxoid, adsorbed  05/05/2015    Tetanus toxoids  05/05/2015       MEDICATIONS:  Current Facility-Administered Medications   Medication Dose Route Frequency Provider Last Rate Last Dose    0.9 % sodium chloride infusion   Intravenous Continuous Laure Boland MD        ceFAZolin (ANCEF) 2 g in dextrose 5 % 50 mL IVPB  2 g Intravenous On Call to [address] [place] [address],[address], MD        sodium chloride flush 0.9 % injection 10 mL  10 mL Intravenous 2 times per day Laure Boland MD        sodium chloride flush 0.9 % injection 10 mL  10 mL Intravenous PRN Laure Boland MD        lactated ringers infusion 1,000 mL  1,000 mL Intravenous Continuous Tristan Lenz MD 50 mL/hr at 02/17/20 0653 1,000 mL at 02/17/20 0653    scopolamine (TRANSDERM-SCOP) transdermal patch 1 patch  1 patch Transdermal Q72H Ector Mercado MD   1 patch at 02/17/20 0708    fentaNYL (SUBLIMAZE) injection 25 mcg  25 mcg Intravenous Q5 Min PRN Ector Mercado MD        fentaNYL (SUBLIMAZE) injection 50 mcg  50 mcg Intravenous Q5 Min PRN Ector Mercado MD        HYDROmorphone (DILAUDID) injection 0.25 mg  0.25 mg Intravenous Q5 Min PRN Ector Mercado MD        HYDROmorphone (DILAUDID) injection 0.5 mg  0.5 mg Intravenous Q5 Min PRN Ector Mercado MD           FAMILY HISTORY:  family history includes Breast Cancer in her maternal aunt;  Cancer in her maternal aunt, maternal grandfather, maternal grandmother, paternal aunt, and paternal grandfather; Diabetes in her father; Heart Disease in her paternal grandfather; High Blood Pressure in her paternal grandfather; Other in her maternal aunt, maternal aunt, mother, and paternal aunt. SOCIAL HISTORY:   reports that she has quit smoking. Her smoking use included cigarettes. She has never used smokeless tobacco. She reports current alcohol use. She reports current drug use. Drug: Marijuana.     VITALS:  Vitals:    02/17/20 0624 02/17/20 0639   BP:  134/79   Pulse:  90   Resp:  18   Temp:  98.6 °F (37 °C)   TempSrc:  Temporal   SpO2:  99%   Weight: 187 lb (84.8 kg)    Height: 5' 2\" (1.575 m)        PHYSICAL EXAM:  General appearance: no apparent distress, alert and cooperative  HEENT: head atraumatic, normocephalic, trachea midline, moist mucous membranes   Neurologic:normal speech, no focal findings or movement disorder noted  Lungs: no increased work of breathing, good air exchange, clear to auscultation bilaterally, no crackles or wheezing  Heart: regular rate and rhythm   Abdomen: soft, gravid, non-tender on palpation, no right upper quadrant tenderness  Extremities:  no calf tenderness bilaterally, non-edematous bilaterally  Musculoskeletal: no gross abnormalities   Psychiatric: mood appropriate, normal affect   Pelvic Exam: deffered to OR     LAB RESULTS:  Hospital Outpatient Visit on 02/05/2020   Component Date Value Ref Range Status    WBC 02/05/2020 12.5* 3.5 - 11.3 k/uL Final    RBC 02/05/2020 5.19* 3.95 - 5.11 m/uL Final    Hemoglobin 02/05/2020 15.2* 11.9 - 15.1 g/dL Final    Hematocrit 02/05/2020 47.5* 36.3 - 47.1 % Final    MCV 02/05/2020 91.5  82.6 - 102.9 fL Final    MCH 02/05/2020 29.3  25.2 - 33.5 pg Final    MCHC 02/05/2020 32.0  28.4 - 34.8 g/dL Final    RDW 02/05/2020 13.3  11.8 - 14.4 % Final    Platelets 92/87/4939 388  138 - 453 k/uL Final    MPV 02/05/2020 9.0  8.1 - 13.5 fL Final    NRBC Automated 02/05/2020 0.0  0.0 per 100 WBC Final    Differential Type 02/05/2020 NOT REPORTED   Final    Seg Neutrophils 02/05/2020 69* 36 - 65 % Final    Lymphocytes 02/05/2020 23* 24 - 43 % Final    Monocytes 02/05/2020 7  3 - 12 % Final    Eosinophils % 02/05/2020 1  1 - 4 % Final    Basophils 02/05/2020 0  0 - 2 % Final    Immature Granulocytes 02/05/2020 0  0 % Final    Segs Absolute 02/05/2020 8.59* 1.50 - 8.10 k/uL Final    Absolute Lymph # 02/05/2020 2.90  1.10 - 3.70 k/uL Final    Absolute Mono # 02/05/2020 0.90  0.10 - 1.20 k/uL Final    Absolute Eos # 02/05/2020 0.07  0.00 - 0.44 k/uL Final    Basophils Absolute 02/05/2020 0.03  0.00 - 0.20 k/uL Final    Absolute Immature Granulocyte 02/05/2020 0.04  0.00 - 0.30 k/uL Final    WBC Morphology 02/05/2020 NOT REPORTED   Final    RBC Morphology 02/05/2020 NOT REPORTED   Final    Platelet Estimate 09/81/4247 NOT REPORTED   Final    Expiration Date 02/05/2020 02/20/2020,2359   Final    Arm Band Number 02/05/2020 BE [id number]   Final    ABO/Rh 02/05/2020 A POSITIVE   Final    Antibody Screen 02/05/2020 NEGATIVE   Final    Color, UA 02/05/2020 DARK YELLOW* YELLOW Final    Turbidity UA 02/05/2020 CLEAR  CLEAR Final    Glucose, Ur 02/05/2020 NEGATIVE  NEGATIVE Final    Bilirubin Urine 02/05/2020 NEGATIVE  NEGATIVE Final    Ketones, Urine 02/05/2020 NEGATIVE  NEGATIVE Final    Specific [place], UA 02/05/2020 1.029  1.005 - 1.030 Final    Urine Hgb 02/05/2020 TRACE* NEGATIVE Final    pH, UA 02/05/2020 5.0  5.0 - 8.0 Final    Protein, UA 02/05/2020 NEGATIVE  NEGATIVE Final    Urobilinogen, Urine 02/05/2020 Normal  Normal Final    Nitrite, Urine 02/05/2020 NEGATIVE  NEGATIVE Final    Leukocyte Esterase, Urine 02/05/2020 NEGATIVE  NEGATIVE Final    Urinalysis Comments 02/05/2020 NOT REPORTED   Final    Ventricular Rate 02/05/2020 71  BPM Final    Atrial Rate 02/05/2020 71  BPM Final    P-R Interval 02/05/2020 124  ms Final    QRS Duration 02/05/2020 76  ms Final    Q-T Interval 02/05/2020 398  ms Final    QTc Calculation (Bazett) 02/05/2020 432  ms Final    P Axis 02/05/2020 -23  degrees Final    R Axis 02/05/2020 1  degrees Final    T Axis 02/05/2020 13  degrees Final    CEA 02/05/2020 0.8  <3.9 ng/mL Final    Comment: The Roche \"ECLIA\" assay is used. Results obtained with different assay methods cannot be   used interchangeably.   02/05/2020 6  <38 U/mL Final    Glucose 02/05/2020 82  70 - 99 mg/dL Final    BUN 02/05/2020 11  6 - 20 mg/dL Final    CREATININE 02/05/2020 0.48* 0.50 - 0.90 mg/dL Final    Bun/Cre Ratio 02/05/2020 NOT REPORTED  9 - 20 Final    Calcium 02/05/2020 9.1  8.6 - 10.4 mg/dL Final    Sodium 02/05/2020 138  135 - 144 mmol/L Final    Potassium 02/05/2020 4.2  3.7 - 5.3 mmol/L Final    Chloride 02/05/2020 103  98 - 107 mmol/L Final    CO2 02/05/2020 22  20 - 31 mmol/L Final    Anion Gap 02/05/2020 13  9 - 17 mmol/L Final    Alkaline Phosphatase 02/05/2020 57  35 - 104 U/L Final    ALT 02/05/2020 9  5 - 33 U/L Final    AST 02/05/2020 15  <32 U/L Final    Total Bilirubin 02/05/2020 0.74  0.3 - 1.2 mg/dL Final    Total Protein 02/05/2020 7.9  6.4 - 8.3 g/dL Final    Alb 02/05/2020 4.1  3.5 - 5.2 g/dL Final    Albumin/Globulin Ratio 02/05/2020 1.1  1.0 - 2.5 Final    GFR Non- 02/05/2020 >60  >60 mL/min Final    GFR  02/05/2020 >60  >60 mL/min Final    GFR Comment 02/05/2020        Final    Comment: Average GFR for 30-36 years old:   80 mL/min/1.73sq m  Chronic Kidney Disease:   <60 mL/min/1.73sq m  Kidney failure:   <15 mL/min/1.73sq m              eGFR calculated using average adult body mass.  Additional eGFR calculator available at:        Qwickly.br            GFR Staging 02/05/2020 NOT REPORTED   Final    HCG(Urine) Pregnancy Test 02/05/2020 NEGATIVE  NEGATIVE Final    Comment: Specimens with hCG levels near the threshold of the test (25 mIU/mL) may give a negative or   indeterminate result. In such cases, another test should be performed with a new specimen   in 48-72 hours. If early pregnancy is suspected clinically in this setting, correlation   with quantitative serum b-hCG level is suggested.  - 02/05/2020        Final    WBC, UA 02/05/2020 None  0 - 5 /HPF Final    RBC, UA 02/05/2020 2 TO 5  0 - 4 /HPF Final    Reference range defined for non-centrifuged specimen.  Casts UA 02/05/2020 2 TO 5 HYALINE Reference range defined for non-centrifuged specimen. 0 - 8 /LPF Final    Crystals UA 02/05/2020 NOT REPORTED  None /HPF Final    Epithelial Cells UA 02/05/2020 0 TO 2  0 - 5 /HPF Final    Renal Epithelial, Urine 02/05/2020 NOT REPORTED  0 /HPF Final    Bacteria, UA 02/05/2020 NOT REPORTED  None Final    Mucus, UA 02/05/2020 NOT REPORTED  None Final    Trichomonas, UA 02/05/2020 NOT REPORTED  None Final    Amorphous, UA 02/05/2020 NOT REPORTED  None Final    Other Observations UA 02/05/2020 NOT REPORTED  NOT REQ. Final    Yeast, UA 02/05/2020 NOT REPORTED  None Final   Hospital Outpatient Visit on 01/30/2020   Component Date Value Ref Range Status    T. pallidum, IgG 01/30/2020 NONREACTIVE  NONREACTIVE Final    Comment:       T. pallidum antibodies are not detected. There is no serological evidence of infection with T. pallidum (early primary syphilis   cannot be excluded). Retest in 2-4 weeks if syphilis is clinically suspect.            Hospital Outpatient Visit on 01/30/2020   Component Date Value Ref Range Status    Vit D, 25-Hydroxy 01/30/2020 44.8  30.0 - 100.0 ng/mL Final    Comment:    Reference Range:  Vitamin D status         Range   Deficiency              <20 ng/mL   Mild Deficiency       20-30 ng/mL   Sufficiency           ng/mL   Toxicity               >100 ng/mL      Thyroxine, Free 01/30/2020 1.21  0.93 - 1.70 ng/dL Final    TSH 01/30/2020 3.09  0.30 - 5.00 mIU/L Final    Cholesterol 01/30/2020 170  <200 mg/dL Final    Comment:    Cholesterol Guidelines:      <200  Desirable   200-240  Borderline      >240  Undesirable         HDL 01/30/2020 65  >40 mg/dL Final    Comment:    HDL Guidelines:    <40     Undesirable   40-59    Borderline    >59     Desirable         LDL Cholesterol 01/30/2020 79  0 - 130 mg/dL Final    Comment:    LDL Guidelines:     <100    Desirable   100-129   Near to/above Desirable   130-159   Borderline      >159   Undesirable     Direct (measured) LDL and calculated LDL are not interchangeable tests.  Chol/HDL Ratio 01/30/2020 2.6  <5 Final            Triglycerides 01/30/2020 132  <150 mg/dL Final    Comment:    Triglyceride Guidelines:     <150   Desirable   150-199  Borderline   200-499  High     >499   Very high   Based on AHA Guidelines for fasting triglyceride, October 2012.  VLDL 01/30/2020 NOT REPORTED  1 - 30 mg/dL Final       DIAGNOSTICS:  Us Non Ob Transvaginal    Result Date: 1/21/2020  EXAMINATION: PELVIC ULTRASOUND 1/21/2020 TECHNIQUE: Transabdominal and transvaginal pelvic ultrasound was performed with color doppler flow evaluation without spectral waveform analysis. COMPARISON: CT abdomen pelvis from 04/25/2019 HISTORY: ORDERING SYSTEM PROVIDED HISTORY: Adenocarcinoma in situ (AIS) of uterine cervix 61-year-old female with adenocarcinoma in situ of uterine cervix FINDINGS: Measurements: Patient's LMP is 01/09/2020. Uterus: 6.6 x 2.1 x 3.4 cm Endometrial stripe:  6 mm Right Ovary:  2.2 x 0.9 x 1.6 cm Left Ovary:  2.3 x 1.3 x 1.5 cm Ultrasound Findings: Uterus: Uterus demonstrates normal myometrial echotexture. Endometrial stripe: Endometrial stripe is within normal limits. Right Ovary: Right ovary is within normal limits. Right ovarian follicles. Left Ovary:  Left ovary is within normal limits. Left ovarian follicles. Free Fluid: No evidence of free fluid. Overall, unremarkable pelvic ultrasound. Bilateral ovarian follicles.  Endometrial

## 2020-02-17 NOTE — DISCHARGE SUMMARY
Center  2/18/2020, 10:53 AM       Attending Physician Statement  I have discussed the care of Freida Simpson, including pertinent history and exam findings,  with the resident. I have seen and examined the patient and the key elements of all parts of the encounter have been performed by me. I agree with the assessment, plan and orders as documented by the resident.      Judith Rockwell MD

## 2020-02-17 NOTE — BRIEF OP NOTE
was seen flowing through both ureteral orifices. No sutures were noted in the bladder. Urine remained clear throughout the entire procedure.     Best Strauss MD  Date: 2/17/2020  Time: 11:15 AM

## 2020-02-18 VITALS
SYSTOLIC BLOOD PRESSURE: 118 MMHG | RESPIRATION RATE: 16 BRPM | DIASTOLIC BLOOD PRESSURE: 75 MMHG | WEIGHT: 187 LBS | BODY MASS INDEX: 34.41 KG/M2 | HEIGHT: 62 IN | TEMPERATURE: 98.4 F | OXYGEN SATURATION: 93 % | HEART RATE: 86 BPM

## 2020-02-18 LAB
ABSOLUTE EOS #: 0.1 K/UL (ref 0–0.44)
ABSOLUTE IMMATURE GRANULOCYTE: 0.07 K/UL (ref 0–0.3)
ABSOLUTE LYMPH #: 2.11 K/UL (ref 1.1–3.7)
ABSOLUTE MONO #: 1.32 K/UL (ref 0.1–1.2)
ANION GAP SERPL CALCULATED.3IONS-SCNC: 15 MMOL/L (ref 9–17)
BASOPHILS # BLD: 0 % (ref 0–2)
BASOPHILS ABSOLUTE: 0.03 K/UL (ref 0–0.2)
BUN BLDV-MCNC: 4 MG/DL (ref 6–20)
BUN/CREAT BLD: ABNORMAL (ref 9–20)
CALCIUM SERPL-MCNC: 8.3 MG/DL (ref 8.6–10.4)
CHLORIDE BLD-SCNC: 105 MMOL/L (ref 98–107)
CO2: 21 MMOL/L (ref 20–31)
CREAT SERPL-MCNC: 0.48 MG/DL (ref 0.5–0.9)
CULTURE: NO GROWTH
DIFFERENTIAL TYPE: ABNORMAL
EOSINOPHILS RELATIVE PERCENT: 1 % (ref 1–4)
GFR AFRICAN AMERICAN: >60 ML/MIN
GFR NON-AFRICAN AMERICAN: >60 ML/MIN
GFR SERPL CREATININE-BSD FRML MDRD: ABNORMAL ML/MIN/{1.73_M2}
GFR SERPL CREATININE-BSD FRML MDRD: ABNORMAL ML/MIN/{1.73_M2}
GLUCOSE BLD-MCNC: 101 MG/DL (ref 70–99)
HCT VFR BLD CALC: 40.9 % (ref 36.3–47.1)
HEMOGLOBIN: 12.9 G/DL (ref 11.9–15.1)
IMMATURE GRANULOCYTES: 0 %
LYMPHOCYTES # BLD: 12 % (ref 24–43)
Lab: NORMAL
MCH RBC QN AUTO: 29.5 PG (ref 25.2–33.5)
MCHC RBC AUTO-ENTMCNC: 31.5 G/DL (ref 28.4–34.8)
MCV RBC AUTO: 93.4 FL (ref 82.6–102.9)
MONOCYTES # BLD: 7 % (ref 3–12)
NRBC AUTOMATED: 0 PER 100 WBC
PDW BLD-RTO: 13.5 % (ref 11.8–14.4)
PLATELET # BLD: 313 K/UL (ref 138–453)
PLATELET ESTIMATE: ABNORMAL
PMV BLD AUTO: 9.3 FL (ref 8.1–13.5)
POTASSIUM SERPL-SCNC: 3.8 MMOL/L (ref 3.7–5.3)
RBC # BLD: 4.38 M/UL (ref 3.95–5.11)
RBC # BLD: ABNORMAL 10*6/UL
SEG NEUTROPHILS: 80 % (ref 36–65)
SEGMENTED NEUTROPHILS ABSOLUTE COUNT: 14.51 K/UL (ref 1.5–8.1)
SODIUM BLD-SCNC: 141 MMOL/L (ref 135–144)
SPECIMEN DESCRIPTION: NORMAL
SURGICAL PATHOLOGY REPORT: NORMAL
SURGICAL PATHOLOGY REPORT: NORMAL
WBC # BLD: 18.1 K/UL (ref 3.5–11.3)
WBC # BLD: ABNORMAL 10*3/UL

## 2020-02-18 PROCEDURE — 2500000003 HC RX 250 WO HCPCS: Performed by: STUDENT IN AN ORGANIZED HEALTH CARE EDUCATION/TRAINING PROGRAM

## 2020-02-18 PROCEDURE — 36415 COLL VENOUS BLD VENIPUNCTURE: CPT

## 2020-02-18 PROCEDURE — 2580000003 HC RX 258: Performed by: STUDENT IN AN ORGANIZED HEALTH CARE EDUCATION/TRAINING PROGRAM

## 2020-02-18 PROCEDURE — 6360000002 HC RX W HCPCS: Performed by: STUDENT IN AN ORGANIZED HEALTH CARE EDUCATION/TRAINING PROGRAM

## 2020-02-18 PROCEDURE — 80048 BASIC METABOLIC PNL TOTAL CA: CPT

## 2020-02-18 PROCEDURE — 6370000000 HC RX 637 (ALT 250 FOR IP): Performed by: STUDENT IN AN ORGANIZED HEALTH CARE EDUCATION/TRAINING PROGRAM

## 2020-02-18 PROCEDURE — 85025 COMPLETE CBC W/AUTO DIFF WBC: CPT

## 2020-02-18 PROCEDURE — 6360000002 HC RX W HCPCS: Performed by: OBSTETRICS & GYNECOLOGY

## 2020-02-18 RX ORDER — HYDROCODONE BITARTRATE AND ACETAMINOPHEN 5; 325 MG/1; MG/1
1 TABLET ORAL EVERY 6 HOURS PRN
Qty: 28 TABLET | Refills: 0 | Status: SHIPPED | OUTPATIENT
Start: 2020-02-18 | End: 2020-02-25

## 2020-02-18 RX ORDER — IBUPROFEN 600 MG/1
600 TABLET ORAL PRN
Qty: 30 TABLET | Refills: 0 | Status: SHIPPED | OUTPATIENT
Start: 2020-02-18 | End: 2020-06-04

## 2020-02-18 RX ORDER — PSEUDOEPHEDRINE HCL 30 MG
100 TABLET ORAL 2 TIMES DAILY
Qty: 60 CAPSULE | Refills: 0 | Status: SHIPPED | OUTPATIENT
Start: 2020-02-18 | End: 2020-12-11

## 2020-02-18 RX ORDER — ONDANSETRON 4 MG/1
4 TABLET, FILM COATED ORAL EVERY 8 HOURS PRN
Qty: 10 TABLET | Refills: 0 | Status: SHIPPED | OUTPATIENT
Start: 2020-02-18 | End: 2020-06-04 | Stop reason: SDUPTHER

## 2020-02-18 RX ORDER — SIMETHICONE 80 MG
80 TABLET,CHEWABLE ORAL EVERY 6 HOURS
Qty: 30 TABLET | Refills: 0 | Status: SHIPPED | OUTPATIENT
Start: 2020-02-18 | End: 2020-06-04

## 2020-02-18 RX ADMIN — ENOXAPARIN SODIUM 40 MG: 40 INJECTION SUBCUTANEOUS at 08:58

## 2020-02-18 RX ADMIN — DOCUSATE SODIUM 100 MG: 100 CAPSULE, LIQUID FILLED ORAL at 08:58

## 2020-02-18 RX ADMIN — HYDROCODONE BITARTRATE AND ACETAMINOPHEN 2 TABLET: 5; 325 TABLET ORAL at 00:02

## 2020-02-18 RX ADMIN — Medication 10 ML: at 09:05

## 2020-02-18 RX ADMIN — KETOROLAC TROMETHAMINE 30 MG: 30 INJECTION, SOLUTION INTRAMUSCULAR; INTRAVENOUS at 05:00

## 2020-02-18 RX ADMIN — SIMETHICONE 80 MG: 80 TABLET, CHEWABLE ORAL at 02:00

## 2020-02-18 RX ADMIN — MAGNESIUM HYDROXIDE 30 ML: 400 SUSPENSION ORAL at 08:58

## 2020-02-18 RX ADMIN — POTASSIUM CHLORIDE, DEXTROSE MONOHYDRATE AND SODIUM CHLORIDE: 150; 5; 450 INJECTION, SOLUTION INTRAVENOUS at 09:46

## 2020-02-18 RX ADMIN — SIMETHICONE 80 MG: 80 TABLET, CHEWABLE ORAL at 08:58

## 2020-02-18 RX ADMIN — HYDROCODONE BITARTRATE AND ACETAMINOPHEN 2 TABLET: 5; 325 TABLET ORAL at 12:44

## 2020-02-18 RX ADMIN — KETOROLAC TROMETHAMINE 30 MG: 30 INJECTION, SOLUTION INTRAMUSCULAR; INTRAVENOUS at 10:58

## 2020-02-18 RX ADMIN — POTASSIUM CHLORIDE, DEXTROSE MONOHYDRATE AND SODIUM CHLORIDE: 150; 5; 450 INJECTION, SOLUTION INTRAVENOUS at 00:03

## 2020-02-18 ASSESSMENT — PAIN SCALES - GENERAL
PAINLEVEL_OUTOF10: 4
PAINLEVEL_OUTOF10: 8
PAINLEVEL_OUTOF10: 8
PAINLEVEL_OUTOF10: 7
PAINLEVEL_OUTOF10: 8

## 2020-02-18 ASSESSMENT — PAIN DESCRIPTION - ORIENTATION: ORIENTATION: LEFT

## 2020-02-18 ASSESSMENT — PAIN DESCRIPTION - LOCATION
LOCATION: ABDOMEN
LOCATION: ABDOMEN

## 2020-02-18 ASSESSMENT — PAIN DESCRIPTION - PAIN TYPE
TYPE: ACUTE PAIN
TYPE: ACUTE PAIN

## 2020-02-18 NOTE — PROGRESS NOTES
CLINICAL PHARMACY NOTE: MEDS TO [address] Select Patient?: Yes  Total # of Prescriptions Filled: 5   The following medications were delivered to the patient:  · Ibuprofen 600 mg  · Hydrocodone -APAP 5/325  · MI -Acid gas relief 80 mg chew  · Colace 100 mg  · Ondansetron 4 mg  Total # of Interventions Completed: 0  Time Spent (min): 0    Additional Documentation:  Five meds were delivered to pt at bedside on 2/18/2020 by [facility]

## 2020-02-18 NOTE — OP NOTE
and revealed adenocarcinoma in  situ. The patient was then referred to [facility] for  further workup and definitive management. The patient had a poor  reproductive history with 3 previous miscarriages. She also had history  of \"aortic stenosis and pulmonary stenosis\" and sees cardiologist, Dr. Cuong Durham. The patient also had other comorbidities including asthma,  hypothyroidism, hyperlipidemia, sacroiliitis, and GERD. The patient's  only previous surgery was a laparoscopic cholecystectomy. Her family history was significant in that her maternal aunt had breast  cancer and a paternal aunt had ovarian cancer. The patient was  counseled on the options and she preferred to proceed directly with a  laparoscopic robotic hysterectomy. We mentioned the importance of  removing both of her fallopian tubes. Because of her young age, ovarian  preservation was recommended and the patient agreed. The patient's  preoperative workup was normal.  She was taken to surgery on 02/17/2020. DESCRIPTION OF OPERATIVE PROCEDURE:  Under adequate general anesthesia,  she was placed in the dorsal lithotomy position, and a pelvic  examination was performed. The patient's vagina was small. Uterus was  also quite small and high. In the pelvis, her uterus was felt to be  small and symmetrical.  There were no adnexal masses noted. We decided  to place a small VCare uterine manipulator. This was performed without  difficulty. We then performed the abdominal portion of the procedure. The  appropriate distance was measured from the symphysis pubis to the  umbilicus. We decided to place a small incision in the inferior portion  of the umbilicus. All port sites were initially injected with 0.25%  Marcaine. A Veress needle was then placed through this incision to  create a pneumoperitoneum with CO2. We then measured for the different  trocar sites.   We placed two 8-mm robotic trocars, one on the right and  one adjacent to the cervix in order to  prevent ureteral damage. These were then cut. Multiple bites were  necessary on both sides. Eventually, we were able to get the bladder  down far enough to where we could see the VCare cup. With upward  pressure on the VCare cup, we cut through at 12 o'clock and identified  the cup. We then cut through posteriorly at 6 o'clock to identify the  cup. With rest of the ligaments and vessels having been cauterized and  cut, we used monopolar scissors to cut around the VCare cup in a  circumferential manner, and the specimen was freed. It was then  delivered through the vagina in an en bloc fashion to include the  uterus, cervix, and bilateral tubes. Both ovaries were left in situ. At this point, hemostasis was obtained, and the bladder cuff was closed  using #1 Vicryl in a figure-of-eight fashion. A single suture of 0 PDS  was placed in the midline afterwards and tied. We were concerned about the left side of the bladder corner and the left  ureter on that side, and even though the urine remained clear throughout  the case, we decided to perform cystoscopy to make sure there were no  injuries. The patient was given 0.5 mL of fluorescein, and cystoscopy was  performed with normal saline. Both ureteral orifices were noted to  efflux urine and fluorescein. There were no sutures noted through the  bladder. There was no blood in the bladder. At this point, the procedure was concluded. The [employer] robot was undocked, and the patient was straightened. The  trocar sites were heavily lavaged with Betadine and saline solution, and  the skin was then closed with a running subcuticular suture of 4-0  Monocryl. Dermabond was then placed on the skin as a dressing and  sealant. The patient tolerated the surgery and anesthesia well. She was then  returned to the recovery room in satisfactory condition.         Kim Connor MD    D: 02/17/2020 11:43:48       T: 02/17/2020 13:55:13     [email or web address]  Job#: [id number]     Doc#: [id number]    CC:

## 2020-02-18 NOTE — PROGRESS NOTES
Gynecology Oncology Progress Note      [place]. Rochelle Biswas is a 42 y.o. female , POD # 1 s/p BRYANT BS, Cytologic Washings, STEVAN & Cystoscopy 20    Patient seen and examined. Pain is well controlled when she takes her pain medication. Reports the pain is worse when she tries to stand from a seated position. Patient is  tolerating oral intake. She is urinating adequately. She denies any vaginal bleeding. She is  ambulating well. She is  passing flatus. She has not had a bowel movement this am. Patient denies any headache, visual changes, difficulty breathing, chest pain, SOB, N/V, F/C, and pain/swelling in lower extremities. Vitals:  Vitals:    20 1656 20 2100 20 0003 20 0500   BP:  125/76 114/78 118/78   Pulse:  80 77 79   Resp:  16 16 16   Temp: 98.7 °F (37.1 °C) 98.2 °F (36.8 °C) 98 °F (36.7 °C) 98.9 °F (37.2 °C)   TempSrc: Oral Oral Oral Oral   SpO2:       Weight:       Height:             Intake/Output:   Last Shift: I/O last 3 completed shifts: In: 5665 [P.O.:1065; I.V.:3724]  Out: 3969 [Urine:1175; Blood:15]  Current Shift: No intake/output data recorded.     61 mL clr UOP/ hr    General appearance: no apparent distress, alert and cooperative  HEENT: head atraumatic, normocephalic, trachea midline, moist mucous membranes   Neurologic:  normal speech, no focal findings or movement disorder noted  Lungs: no increased work of breathing, good air exchange, clear to auscultation bilaterally, no crackles or wheezing  Heart: regular rate and rhythm   Abdomen: soft, non-tender on palpation, no guarding, no rebound, no rigidity, no CVA tenderness bilaterally   Incision: clean, dry intact with Dermabond in place   Extremities:  no calf tenderness bilaterally, non-edematous bilaterally, SCDs in place and functioning   Musculoskeletal: no gross abnormalities, range of motion appropriate for age   Psychiatric: mood appropriate, normal affect     Lab:  Recent Results (from the past 4 wnl this morning    - DVT Proph:SCDs and Lovenox this am    - Abx: s/p ancef 2 g preoperatively    - Diet: general    - IVF:  D5 1/2 NS w/ 20 mEq KCl @ 100 mL/hr   - Path: pending, frozen section presumptive negative for invasive cancer   - Abdominal binder ordered    - Anticipate discharge home today     HLD   - Lipitor 20 mg qd    Asthma   - Clinically stable with albuterol PRN   - No wheezing on exam         Principal Problem:    RALH BS, Cytologic Washings, STEVAN & Cystoscopy 2/17/20  Active Problems:    Asthma    Murmur    Hyperlipidemia    BMI 35.0-35.9,adult    S/P laparoscopic cholecystectomy    Hypothyroidism    Adenocarcinoma in situ (AIS) of uterine cervix    Mild mitral regurgitation    Mild left atrial enlargement     Please page the resident named below with questions and concerns. Uma Moreland DO   OB/GYN Resident  Pager [phone] [address] [place] [facility]  2/18/2020, 7:55 AM        Attending Physician Statement  I have discussed the care of Stephanie Stuart Calvin, including pertinent history and exam findings,  with the resident. I have seen and examined the patient and the key elements of all parts of the encounter have been performed by me. I agree with the assessment, plan and orders as documented by the resident.      Javier Resendez MD

## 2020-02-18 NOTE — CARE COORDINATION
Case Management Initial Discharge Plan  [place] Simpson,             Met with:patient or spouse/SO to discuss discharge plans. Information verified: address, contacts, phone number, , insurance Yes  PCP: Ethan ALMANZA PA-C  Date of last visit: in january    Insurance Provider: emily    Discharge Planning    Living Arrangements:  Parent   Support Systems:  Spouse/Significant Other, Family Members    Home has 2 stories  2 stairs to climb to get into front door,  1 flightstairs to climb to reach second floor  Location of bedroom/bathroom in home bathroom on main    Patient able to perform ADL's:Independent    Current Services (outpatient & in home) none  DME equipment: 0  DME provider: 0      Potential Assistance Needed:  N/A    Patient agreeable to home care: No  [place] of choice provided:  n/a    Prior SNF/Rehab Placement and Facility: n/a  Agreeable to SNF/Rehab: No  [place] of choice provided: n/a   Evaluation: no    Expected Discharge date:  20  Patient expects to be discharged to:  home  Follow Up Appointment: Best Day/ Time:      Transportation provider: [email or web address]  Transportation arrangements needed for discharge: No    Readmission Risk              Risk of Unplanned Readmission:        7             Does patient have a readmission risk score greater than 14?: No  If yes, follow-up appointment must be made within 7 days of discharge. Goals of Care: post op pain      Discharge Plan: home with SO independently.           Electronically signed by Andrea Cotton RN on 20 at 12:59 PM

## 2020-02-19 NOTE — CARE COORDINATION
Discharge [id number] [facility]  Clinical Case Management Department  Written by: Tamar Bond RN    Patient Name: Ghassan Sow [facility]  Attending Provider: No att. providers found  Admit Date: 2020  5:46 AM  MRN: [id number]  Account: [de-identified]                     : 1980  Discharge Date: 2020      Disposition: home independently.     Tamar Bond RN

## 2020-03-06 ENCOUNTER — OFFICE VISIT (OUTPATIENT)
Dept: GYNECOLOGIC ONCOLOGY | Age: 40
End: 2020-03-06

## 2020-03-06 VITALS
HEIGHT: 62 IN | HEART RATE: 84 BPM | TEMPERATURE: 97.8 F | SYSTOLIC BLOOD PRESSURE: 132 MMHG | BODY MASS INDEX: 33.64 KG/M2 | DIASTOLIC BLOOD PRESSURE: 89 MMHG | WEIGHT: 182.8 LBS | OXYGEN SATURATION: 96 %

## 2020-03-06 PROCEDURE — 99024 POSTOP FOLLOW-UP VISIT: CPT | Performed by: OBSTETRICS & GYNECOLOGY

## 2020-03-06 ASSESSMENT — ENCOUNTER SYMPTOMS
EYE PROBLEMS: 1
BACK PAIN: 1
ABDOMINAL PAIN: 1

## 2020-03-06 NOTE — PROGRESS NOTES
[address] [place] [address], [facility], [address]  [address] [facility] [address] Marium Morales is a 42 y.o. female who presents for a Post Operative visit today following her surgery of 2020. CC and HPI:  Graciela Houston is a 72-year-old  3, para 0, AB 3 female who was found to have an adenocarcinoma in situ of the endocervix. The patient was found to have an abnormal Pap at the time of an annual examination on 2019. The patient was referred to an OB/GYN and underwent colposcopy on 2019. Abnormal acetowhite changes were noted at 1:00 and 10:00 and biopsies were taken that revealed \"adenocarcinoma in situ\". An endocervical curettage was also performed and revealed \"adenocarcinoma in situ\". The patient was referred to [address] [place] [address] gynecologic oncology for further work-up and definitive management. The patient had a poor reproductive history with 3 previous miscarriages. In addition the patient has aortic stenosis and pulmonary stenosis and sees a cardiologist, Dr. Marietta Argueta. In addition the patient has other comorbidities including asthma, hypothyroidism, hyperlipidemia, sacroiliitis and GERD. She had had a previous laparoscopic cholecystectomy. Family history included a maternal aunt who had breast cancer and a paternal aunt who had ovarian cancer. The patient was counseled regarding options including conservative options as well as hysterectomy. The patient opted for hysterectomy. On 2020 the patient underwent a laparoscopic robotic total hysterectomy, bilateral salpingectomies, peritoneal washings for cytology, cystoscopy and a biopsy of a lesion of vesicouterine peritoneum. Both ovaries were left in situ. There was visual evidence of fine endometriosis. Postoperatively the patient did well. Final pathology revealed a \"focal residual adenocarcinoma in situ\". There was \"no evidence of invasive carcinoma identified\".   \"Benign surgical margins\". No other pathology was noted. A biopsy of the bladder flap peritoneum did reveal \"focal endometriosis\". The patient still has some mild discomfort in the lateral portions of the lower abdomen. She has had no fever. She has had only a small amount of vaginal spotting. She denies having intercourse or placing anything in the vagina. She is having no difficulties with bowel movements or urination. Her appetite is good. She is taking little in the way of medication for pain. ROS:  I have personally reviewed and agree with the review of systems done by my ancillary staff in the [facility] documentation. Objective:  Vitals:    03/06/20 1321 03/06/20 1324   BP: (!) 135/92 132/89   Site: Left Upper Arm Left Upper Arm   Position: Sitting Sitting   Cuff Size: Medium Adult Medium Adult   Pulse: 84    Temp: 100 °F (37.8 °C)    TempSrc: Oral    SpO2: 96%    Weight: 182 lb 12.8 oz (82.9 kg)    Height: 5' 2\" (1.575 m)        Physical Exam:    Neuro: Patient is in no distress. Abdomen: Multiple laparoscopic incision sites appear to be healing very well. There is no evidence of cellulitis. There is no evidence of herniations. Palpation of the lower abdomen reveals only minimal discomfort and no rebound. Assessment:  Cancer Staging  Adenocarcinoma in situ (AIS) of uterine cervix  Staging form: Cervix Uteri, AJCC 8th Edition  - Clinical stage from 2/20/2020: ycT0, cM0 - Signed by Loan Heredia MD on 2/20/2020  Staging comments: This was a small area of adenocarcinoma in situ. Cone biopsy revealed no residual disease. All margins clear. Pap smear was positive for P 16 high risk HPV  - Pathologic stage from 2/20/2020: Stage Unknown (ypT0, pNX, cM0) - Signed by Loan Heredia MD on 2/20/2020  Staging comments: Patient had an adenocarcinoma in situ. Subsequent cold knife cone biopsy revealed no residual disease. All margins negative. Post Operative Changes: Good thus far.     Discussed

## 2020-03-06 NOTE — PROGRESS NOTES
Review of Systems   Constitutional: Positive for appetite change, chills, fatigue and fever. Eyes: Positive for eye problems. Cardiovascular: Positive for leg swelling. Gastrointestinal: Positive for abdominal pain. Endocrine: Positive for hot flashes. Genitourinary: Positive for pelvic pain, vaginal bleeding and vaginal discharge. Musculoskeletal: Positive for back pain. Skin: Positive for wound. Neurological: Positive for dizziness, extremity weakness, headaches and light-headedness. Psychiatric/Behavioral: Positive for depression and sleep disturbance. The patient is nervous/anxious.

## 2020-03-06 NOTE — PATIENT INSTRUCTIONS
Continue your activity restrictions as before. Return in 5 weeks for your second postoperative visit. Call if you have problems in the meantime.

## 2020-03-13 ENCOUNTER — TELEPHONE (OUTPATIENT)
Dept: GYNECOLOGIC ONCOLOGY | Age: 40
End: 2020-03-13

## 2020-03-13 NOTE — TELEPHONE ENCOUNTER
Pt called stating that today she has started to heavier bleeding than the spotting she was having. She also is having cramping. Asked if she has increased activity. States she has just started doing stairs and walking. Discused with Tenzin ALMANZA. Pt notified that some bleeding can occur after surgery with increased activity. To decrease activity today and do stairs one at a time. Call office this afternoon if bleeding increases or does not improve. She can take Ibuprofen for cramping. She voiced understanding.

## 2020-04-10 ENCOUNTER — OFFICE VISIT (OUTPATIENT)
Dept: GYNECOLOGIC ONCOLOGY | Age: 40
End: 2020-04-10

## 2020-04-10 VITALS
HEART RATE: 77 BPM | DIASTOLIC BLOOD PRESSURE: 92 MMHG | HEIGHT: 62 IN | SYSTOLIC BLOOD PRESSURE: 138 MMHG | BODY MASS INDEX: 34.08 KG/M2 | TEMPERATURE: 98.2 F | WEIGHT: 185.2 LBS

## 2020-04-10 PROCEDURE — 99024 POSTOP FOLLOW-UP VISIT: CPT | Performed by: OBSTETRICS & GYNECOLOGY

## 2020-04-10 ASSESSMENT — ENCOUNTER SYMPTOMS
ROS SKIN COMMENTS: HEALING ABDOMINAL INCISIONS
ABDOMINAL PAIN: 1
COUGH: 1
BACK PAIN: 1
NAUSEA: 1

## 2020-04-10 NOTE — PATIENT INSTRUCTIONS
Avoid intercourse and heavy lifting for 2 more weeks. Call if you have any further questions or problems. Return to see us in 4 months.

## 2020-05-06 ENCOUNTER — PATIENT MESSAGE (OUTPATIENT)
Dept: FAMILY MEDICINE CLINIC | Age: 40
End: 2020-05-06

## 2020-05-06 RX ORDER — OMEPRAZOLE 20 MG/1
20 CAPSULE, DELAYED RELEASE ORAL DAILY
Qty: 90 CAPSULE | Refills: 1 | Status: SHIPPED | OUTPATIENT
Start: 2020-05-06 | End: 2021-02-22 | Stop reason: SDUPTHER

## 2020-05-06 RX ORDER — ALBUTEROL SULFATE 90 UG/1
2 AEROSOL, METERED RESPIRATORY (INHALATION) EVERY 6 HOURS PRN
Qty: 1 INHALER | Refills: 1 | Status: SHIPPED | OUTPATIENT
Start: 2020-05-06 | End: 2020-07-27

## 2020-05-22 ENCOUNTER — PATIENT MESSAGE (OUTPATIENT)
Dept: FAMILY MEDICINE CLINIC | Age: 40
End: 2020-05-22

## 2020-05-22 RX ORDER — LORATADINE 10 MG/1
10 TABLET ORAL DAILY
COMMUNITY
End: 2020-05-22 | Stop reason: SDUPTHER

## 2020-05-22 RX ORDER — LORATADINE 10 MG/1
10 TABLET ORAL DAILY
Qty: 30 TABLET | Refills: 1 | Status: SHIPPED | OUTPATIENT
Start: 2020-05-22 | End: 2020-07-27

## 2020-06-04 ENCOUNTER — OFFICE VISIT (OUTPATIENT)
Dept: NEUROLOGY | Age: 40
End: 2020-06-04
Payer: MEDICARE

## 2020-06-04 VITALS
TEMPERATURE: 99 F | HEIGHT: 62 IN | HEART RATE: 98 BPM | SYSTOLIC BLOOD PRESSURE: 135 MMHG | WEIGHT: 190 LBS | DIASTOLIC BLOOD PRESSURE: 85 MMHG | BODY MASS INDEX: 34.96 KG/M2

## 2020-06-04 PROCEDURE — 1036F TOBACCO NON-USER: CPT | Performed by: PSYCHIATRY & NEUROLOGY

## 2020-06-04 PROCEDURE — 99214 OFFICE O/P EST MOD 30 MIN: CPT | Performed by: PSYCHIATRY & NEUROLOGY

## 2020-06-04 PROCEDURE — G8417 CALC BMI ABV UP PARAM F/U: HCPCS | Performed by: PSYCHIATRY & NEUROLOGY

## 2020-06-04 PROCEDURE — G8427 DOCREV CUR MEDS BY ELIG CLIN: HCPCS | Performed by: PSYCHIATRY & NEUROLOGY

## 2020-06-04 RX ORDER — ONDANSETRON 4 MG/1
4 TABLET, FILM COATED ORAL EVERY 8 HOURS PRN
Qty: 10 TABLET | Refills: 3 | Status: SHIPPED | OUTPATIENT
Start: 2020-06-04 | End: 2021-03-11

## 2020-06-04 RX ORDER — SUMATRIPTAN 6 MG/.5ML
INJECTION, SOLUTION SUBCUTANEOUS
Qty: 1 ML | Refills: 5 | Status: SHIPPED | OUTPATIENT
Start: 2020-06-04 | End: 2021-12-07

## 2020-06-04 RX ORDER — SUMATRIPTAN 100 MG/1
100 TABLET, FILM COATED ORAL
Qty: 10 TABLET | Refills: 3 | Status: SHIPPED | OUTPATIENT
Start: 2020-06-04 | End: 2022-03-15

## 2020-06-04 RX ORDER — ERENUMAB-AOOE 140 MG/ML
140 INJECTION, SOLUTION SUBCUTANEOUS
Qty: 1 PEN | Refills: 5 | Status: SHIPPED | OUTPATIENT
Start: 2020-06-04 | End: 2020-12-28

## 2020-06-04 NOTE — PROGRESS NOTES
[facility]  Offices: [address], [place], [address] [facility]  [address] [facility], [address], [place], [address] [facility]  [address] [facility] [address], [address]  Phone: [phone]  Fax: [phone]    MD Maryellen Dowell MD Ahmed B. Harlee Riggs, MD Rolena Hands, MD Jenita Berkeley, MD Blaine Kindred, CNP    6/4/2020      HISTORY OF PRESENT ILLNESS:       I had the pleasure of seeing Nathanael Loaiza. Katie Dueñas, who returns for continuing neurologic care for Chronic migraine without aura, onset in 2012. She was previously doing very well on Aimovig 140 mg SQ for headache prophylaxis, with more than 50% improvement of her headache frequency and severity. Unfortunately, her insurance changed and she has not been able to get Aimovig, last injection was in January. Since then, her headaches are back to occurring on a daily basis. At least 2 of these headaches will be very severe and lasts for 2 to 3 days. She usually takes Imitrex SQ for rescue but has been bleeding a lot with her injections, so she has switched to Imitrex p.o. which gives her partial relief. Her headaches are typically located in the bifrontal and bitemporal areas, described as a pounding and throbbing pain with a severity of 5-8/10. Cold weather usually triggers her headaches and they are associated with nausea as well as photophobia and phonophobia. Headaches are typically aggravated by bright light in physical activity, and partially relieved by rest and sleep. Patient reports she was also recently diagnosed with cervical cancer and underwent a total hysterectomy recently, with good results. She denies any new complaints, no new medications. Prior medication trials: Topamax 125 mg twice a day ( severe paresthesias), amitriptyline 100 mg (worked initially)     Prior testing reviewed:  EMG 9/16/2019: This is a normal electrophysiological study. Radha Nam is no evidence of large fiber pen 5    SUMAtriptan (IMITREX) 100 MG tablet Take 1 tablet by mouth once as needed for Migraine No more than 2 doses in 24 hrs 10 tablet 3    loratadine (CLARITIN) 10 MG tablet Take 1 tablet by mouth daily 30 tablet 1    albuterol sulfate  (90 Base) MCG/ACT inhaler Inhale 2 puffs into the lungs every 6 hours as needed for Wheezing or Shortness of Breath 1 Inhaler 1    omeprazole (PRILOSEC) 20 MG delayed release capsule Take 1 capsule by mouth Daily 90 capsule 1    docusate sodium (COLACE, DULCOLAX) 100 MG CAPS Take 100 mg by mouth 2 times daily (Patient taking differently: Take 100 mg by mouth 2 times daily prn) 60 capsule 0    Cholecalciferol (RA VITAMIN D-3) 50 MCG (2000 UT) CAPS take 1 capsule by mouth once daily 30 capsule 1    Aspirin-Acetaminophen-Caffeine (EXCEDRIN EXTRA STRENGTH PO) Take by mouth as needed Stopped 2/2/2020      azelastine (OPTIVAR) 0.05 % ophthalmic solution 1 drop as needed      Acetaminophen (TYLENOL EXTRA STRENGTH PO) Take 2 tablets by mouth as needed      atorvastatin (LIPITOR) 20 MG tablet take 1 tablet by mouth once daily 90 tablet 1    tiZANidine (ZANAFLEX) 4 MG tablet Take 4 mg by mouth every 8 hours as needed      lidocaine (XYLOCAINE) 5 % ointment Apply topically as needed. 1 Tube 0    fluticasone (FLONASE) 50 MCG/ACT nasal spray 2 sprays by Nasal route daily as needed       diphenhydrAMINE (BENADRYL) 25 MG tablet Take 25 mg by mouth every 6 hours as needed for Itching       No current facility-administered medications for this visit.          ALLERGIES:     Allergies   Allergen Reactions    Decadron [Dexamethasone] Shortness Of Breath     Hives and hot flashes      Prednisone      CAUSES HEADACHE, RASH, hard to breathe    Benzocaine      Mouth numbing medicine- gums and lips swell    Food Hives     Oysters, clams, mussels, scallops    Silicone      Swell up    Tetanus Toxoids      Hives, arm swells up    Cyclopentasiloxane Itching and Swelling    Dimethicone Itching and Swelling    Dimethiconol Itching and Swelling    Bactrim [Sulfamethoxazole-Trimethoprim] Hives    Bee Venom Swelling    Cortisone      HIVES    Seasonal      Hayfever                              REVIEW OF SYSTEMS    CONSTITUTIONAL Weight: absent, Appetite: absent, Fatigue: present      HEENT Ears: diminished, Visual disturbance: present   RESPIRATORY Shortness of breath: absent, Cough: absent   CARDIOVASCULAR Chest pain: present, Leg swelling :present      GI Constipation: absent, Diarrhea: absent, Swallowing change: absent       Urinary frequency: absent, Urinary urgency: absent, Urinary incontinence: absent     MUSCULOSKELETAL Neck pain: present, Back pain: present, Stiffness: present, Muscle pain: present, Joint pain: present Restless legs: absent   DERMATOLOGIC Hair loss: absent, Skin changes: absent   NEUROLOGIC Memory loss: absent, Confusion: present, Seizures: present Trouble walking or imbalance: present, Dizziness: present, Weakness: present, Numbness: present Tremor: present, Spasm: present, Speech difficulty: absent, Headache: present, Light sensitivity: present   PSYCHIATRIC Anxiety: present, Hallucination: absent, Mood disorder: present   HEMATOLOGIC Abnormal bleeding: absent, Anemia: absent, Clotting disorder: absent, Lymph gland changes: absent           PHYSICAL EXAMINATION:       Vitals:    06/04/20 0946   BP: 135/85   Pulse: 98   Temp: 99 °F (37.2 °C)                                              .                                                                                                     General Appearance:  Alert, cooperative, no signs of distress, appears stated age   Head:  Normocephalic, no signs of trauma   Eyes:  Conjunctiva/corneas clear;  eyelids intact   Ears:  Normal external ear and canals   Nose: Nares normal, mucosa normal, no drainage    Throat: Lips and tongue normal; teeth normal;  gums normal   Neck: Supple, intact flexion, extension and

## 2020-06-09 ENCOUNTER — TELEPHONE (OUTPATIENT)
Dept: NEUROLOGY | Age: 40
End: 2020-06-09

## 2020-06-16 ENCOUNTER — TELEPHONE (OUTPATIENT)
Dept: OBGYN CLINIC | Age: 40
End: 2020-06-16

## 2020-06-16 ENCOUNTER — HOSPITAL ENCOUNTER (OUTPATIENT)
Dept: MRI IMAGING | Age: 40
Discharge: HOME | End: 2020-06-18
Payer: MEDICARE

## 2020-06-16 PROBLEM — K63.89 HEPATIC FLEXURE MASS: Status: ACTIVE | Noted: 2020-06-16

## 2020-06-16 PROCEDURE — 2580000003 HC RX 258: Performed by: NURSE PRACTITIONER

## 2020-06-16 PROCEDURE — 6360000004 HC RX CONTRAST MEDICATION: Performed by: NURSE PRACTITIONER

## 2020-06-16 PROCEDURE — A9579 GAD-BASE MR CONTRAST NOS,1ML: HCPCS | Performed by: NURSE PRACTITIONER

## 2020-06-16 PROCEDURE — 77049 MRI BREAST C-+ W/CAD BI: CPT

## 2020-06-16 RX ORDER — 0.9 % SODIUM CHLORIDE 0.9 %
80 INTRAVENOUS SOLUTION INTRAVENOUS ONCE
Status: COMPLETED | OUTPATIENT
Start: 2020-06-16 | End: 2020-06-16

## 2020-06-16 RX ORDER — SODIUM CHLORIDE 0.9 % (FLUSH) 0.9 %
10 SYRINGE (ML) INJECTION PRN
Status: DISCONTINUED | OUTPATIENT
Start: 2020-06-16 | End: 2020-06-19 | Stop reason: HOSPADM

## 2020-06-16 RX ADMIN — SODIUM CHLORIDE 80 ML: 9 INJECTION, SOLUTION INTRAVENOUS at 09:31

## 2020-06-16 RX ADMIN — GADOTERIDOL 19 ML: 279.3 INJECTION, SOLUTION INTRAVENOUS at 09:31

## 2020-06-16 RX ADMIN — Medication 10 ML: at 09:31

## 2020-06-23 RX ORDER — ATORVASTATIN CALCIUM 20 MG/1
TABLET, FILM COATED ORAL
Qty: 90 TABLET | Refills: 1 | Status: SHIPPED | OUTPATIENT
Start: 2020-06-23 | End: 2020-12-28

## 2020-06-24 ENCOUNTER — OFFICE VISIT (OUTPATIENT)
Dept: FAMILY MEDICINE CLINIC | Age: 40
End: 2020-06-24
Payer: MEDICARE

## 2020-06-24 VITALS
HEIGHT: 62 IN | SYSTOLIC BLOOD PRESSURE: 120 MMHG | BODY MASS INDEX: 34.96 KG/M2 | RESPIRATION RATE: 16 BRPM | DIASTOLIC BLOOD PRESSURE: 79 MMHG | WEIGHT: 190 LBS | TEMPERATURE: 98.1 F | HEART RATE: 91 BPM

## 2020-06-24 PROCEDURE — 99213 OFFICE O/P EST LOW 20 MIN: CPT | Performed by: PHYSICIAN ASSISTANT

## 2020-06-24 PROCEDURE — G8427 DOCREV CUR MEDS BY ELIG CLIN: HCPCS | Performed by: PHYSICIAN ASSISTANT

## 2020-06-24 PROCEDURE — G8417 CALC BMI ABV UP PARAM F/U: HCPCS | Performed by: PHYSICIAN ASSISTANT

## 2020-06-24 PROCEDURE — 1036F TOBACCO NON-USER: CPT | Performed by: PHYSICIAN ASSISTANT

## 2020-07-02 ASSESSMENT — ENCOUNTER SYMPTOMS
VOMITING: 0
SHORTNESS OF BREATH: 0
BLOOD IN STOOL: 0
CHEST TIGHTNESS: 0
DIARRHEA: 0
BACK PAIN: 0
NAUSEA: 0
COUGH: 0
ABDOMINAL DISTENTION: 0
ABDOMINAL PAIN: 0
CONSTIPATION: 0
WHEEZING: 0

## 2020-07-02 NOTE — PROGRESS NOTES
[address] PRIMARY CARE  [address] [place] [address] [place] [id number]  Dept: [phone]  Dept Fax: [phone]    Office Progress Note  Date of patient's visit: 7/2/2020  Patient's Name:  Stan Silva Shepherd YOB: 1980            FRANTZ PEREZ PA  ================================================================    REASON FOR VISIT/CHIEF COMPLAINT:  Discuss Labs (MRI)    HISTORY OF PRESENTING ILLNESS:  History was obtained from: patient. Stan Maurice is a 42 y.o. is here for follow up for MRI results. Patient has history of breast cancer and had recent MRI which showed incidental nonspecific small skin lesions noted in the inferior outer left breast which is being followed up by dermatology as well as partially visualized T2 hyperintense lesion in the posterior right hepatic lobe. Recommended hepatic mass MRI which has been ordered. Patient has no current symptoms. Patient states understanding and agrees with plan      Patient Active Problem List   Diagnosis    Asthma    Murmur    Seasonal allergies    Abnormal TSH    Hyperlipidemia    Vitamin D deficiency    Need for pneumococcal vaccination    Enlarged tonsils    Abnormal ultrasound of gallbladder    Colitis    BMI 35.0-35.9,adult    S/P laparoscopic cholecystectomy    Symptomatic cholelithiasis    Adenocarcinoma in situ (AIS) of uterine cervix    Mild mitral regurgitation    Mild left atrial enlargement    RALH BS, Cytologic Washings, STEVAN & Cystoscopy 2/17/20    At high risk for breast cancer    Hepatic flexure mass    Abnormal MRI       There are no preventive care reminders to display for this patient.     Allergies   Allergen Reactions    Decadron [Dexamethasone] Shortness Of Breath     Hives and hot flashes      Prednisone      CAUSES HEADACHE, RASH, hard to breathe    Benzocaine      Mouth numbing medicine- gums and lips swell    Food Hives     Oysters, clams, mussels, daily as needed       diphenhydrAMINE (BENADRYL) 25 MG tablet Take 25 mg by mouth every 6 hours as needed for Itching      SUMAtriptan (IMITREX) 100 MG tablet Take 1 tablet by mouth once as needed for Migraine No more than 2 doses in 24 hrs 10 tablet 3     No current facility-administered medications for this visit. Social History     Tobacco Use    Smoking status: Former Smoker     Types: Cigarettes    Smokeless tobacco: Never Used    Tobacco comment: half pack a month quit 2006   Substance Use Topics    Alcohol use: Yes     Alcohol/week: 0.0 standard drinks     Comment: social     Drug use: Yes     Types: Marijuana     Comment: edible cannabis for pain       Family History   Problem Relation Age of Onset   [facility] Diabetes Father     Other Mother         blood disorder    Other Maternal Aunt         blood disorder    Cancer Maternal Aunt         breast    Breast Cancer Maternal Aunt     Other Paternal Aunt         chiari malformation type 2     Cancer Paternal Aunt         cervical cancer    Cancer Maternal Grandmother         uterine cancer     Cancer Maternal Grandfather     High Blood Pressure Paternal Grandfather     Heart Disease Paternal Grandfather     Cancer Paternal Grandfather     Other Maternal Aunt         brain aneurysm        Review of Systems   Constitutional: Negative for appetite change, chills, diaphoresis, fatigue, fever and unexpected weight change. Respiratory: Negative for cough, chest tightness, shortness of breath and wheezing. Cardiovascular: Negative for chest pain, palpitations and leg swelling. Gastrointestinal: Negative for abdominal distention, abdominal pain, blood in stool, constipation, diarrhea, nausea and vomiting. Genitourinary: Negative for dysuria, frequency and urgency. Musculoskeletal: Negative for back pain and myalgias. Neurological: Negative for dizziness, syncope, weakness, light-headedness and headaches.            Physical Exam  Vitals signs and nursing note reviewed. Constitutional:       Appearance: Normal appearance. Cardiovascular:      Rate and Rhythm: Normal rate and regular rhythm. Heart sounds: Normal heart sounds. Pulmonary:      Effort: Pulmonary effort is normal.      Breath sounds: Normal breath sounds. Abdominal:      General: Abdomen is flat. Bowel sounds are normal.      Palpations: Abdomen is soft. Neurological:      Mental Status: She is alert. Vitals:    06/24/20 1149   BP: 120/79   Site: Left Upper Arm   Position: Sitting   Cuff Size: Medium Adult   Pulse: 91   Resp: 16   Temp: 98.1 °F (36.7 °C)   TempSrc: Temporal   Weight: 190 lb (86.2 kg)   Height: 5' 2.01\" (1.575 m)     BP Readings from Last 3 Encounters:   06/24/20 120/79   06/04/20 135/85   04/10/20 (!) 138/92              DIAGNOSTIC FINDINGS:  CBC:  Lab Results   Component Value Date    WBC 18.1 02/18/2020    HGB 12.9 02/18/2020     02/18/2020       BMP:    Lab Results   Component Value Date     02/18/2020    K 3.8 02/18/2020     02/18/2020    CO2 21 02/18/2020    BUN 4 02/18/2020    CREATININE 0.48 02/18/2020    GLUCOSE 101 02/18/2020         FASTING LIPID PANEL:  Lab Results   Component Value Date    CHOL 170 01/30/2020    HDL 65 01/30/2020    TRIG 132 01/30/2020       No results found for this visit on 06/24/20. ASSESSMENT AND PLAN:   Diagnosis Orders   1. Abnormal MRI   abdominal MRI as ordered       FOLLOW UP AND INSTRUCTIONS:  Return in about 1 month (around 7/24/2020), or if symptoms worsen or fail to improve. · Discussed use, benefit, and side effects of prescribed medications. Barriers to medication compliance addressed. All patient questions answered. Pt voiced understanding. · Patient instructed to return to the office if symptoms do not resolve or go directly to the ER if the symptoms worsen - patient voiced understanding.     · Patient given educational materials - see patient instructions    Sandy Delgado HANNA ALMANZA  [facility]  7/2/2020, 8:15 AM    This note is created with the assistance of a speech-recognition program. While intending to generate a document that actually reflects the content of the visit, the document can still have some mistakes which may not have been identified and corrected by editing.

## 2020-07-06 ENCOUNTER — HOSPITAL ENCOUNTER (OUTPATIENT)
Dept: MRI IMAGING | Facility: CLINIC | Age: 40
Discharge: HOME | End: 2020-07-08
Payer: MEDICARE

## 2020-07-06 PROCEDURE — 74183 MRI ABD W/O CNTR FLWD CNTR: CPT

## 2020-07-06 PROCEDURE — 2580000003 HC RX 258: Performed by: PHYSICIAN ASSISTANT

## 2020-07-06 PROCEDURE — A9579 GAD-BASE MR CONTRAST NOS,1ML: HCPCS | Performed by: PHYSICIAN ASSISTANT

## 2020-07-06 PROCEDURE — 6360000004 HC RX CONTRAST MEDICATION: Performed by: PHYSICIAN ASSISTANT

## 2020-07-06 RX ORDER — 0.9 % SODIUM CHLORIDE 0.9 %
50 INTRAVENOUS SOLUTION INTRAVENOUS ONCE
Status: COMPLETED | OUTPATIENT
Start: 2020-07-06 | End: 2020-07-06

## 2020-07-06 RX ADMIN — GADOTERIDOL 17 ML: 279.3 INJECTION, SOLUTION INTRAVENOUS at 09:41

## 2020-07-06 RX ADMIN — SODIUM CHLORIDE 50 ML: 9 INJECTION, SOLUTION INTRAVENOUS at 09:41

## 2020-07-07 ENCOUNTER — TELEPHONE (OUTPATIENT)
Dept: FAMILY MEDICINE CLINIC | Age: 40
End: 2020-07-07

## 2020-07-07 PROBLEM — D18.03 LIVER HEMANGIOMA: Status: ACTIVE | Noted: 2020-07-07

## 2020-07-08 ENCOUNTER — TELEPHONE (OUTPATIENT)
Dept: FAMILY MEDICINE CLINIC | Age: 40
End: 2020-07-08

## 2020-07-10 ENCOUNTER — TELEPHONE (OUTPATIENT)
Dept: FAMILY MEDICINE CLINIC | Age: 40
End: 2020-07-10

## 2020-07-10 NOTE — TELEPHONE ENCOUNTER
Called patient with results of MRI. Hemangioma noted. No further evaluation needed.   Patient's questions were answered and patient states understanding and agrees with plan

## 2020-07-27 RX ORDER — ALBUTEROL SULFATE 90 UG/1
AEROSOL, METERED RESPIRATORY (INHALATION)
Qty: 8.5 G | Refills: 0 | Status: SHIPPED | OUTPATIENT
Start: 2020-07-27 | End: 2021-05-19

## 2020-07-27 RX ORDER — LORATADINE 10 MG/1
TABLET ORAL
Qty: 30 TABLET | Refills: 1 | Status: SHIPPED | OUTPATIENT
Start: 2020-07-27 | End: 2020-09-23 | Stop reason: SDUPTHER

## 2020-07-27 NOTE — TELEPHONE ENCOUNTER
Next Visit Date:  Future Appointments   Date Time Provider Skylar Foster   8/14/2020 10:00 AM Alfonso Seip, MD GYN Oncology [address] [facility]   8/21/2020  8:15 AM Guanako Bloom MD [id number]   12/15/2020 10:00 AM Pa Bull MD [address] Maintenance   Topic Date Due    Flu vaccine (1) 09/01/2020    Lipid screen  01/30/2021    Breast cancer screen  11/14/2022    Pneumococcal 0-64 years Vaccine  Completed    HIV screen  Completed    Hepatitis A vaccine  Aged Out    Hepatitis B vaccine  Aged Out    Hib vaccine  Aged Out    Meningococcal (ACWY) vaccine  Aged Out    Varicella vaccine  Discontinued       Hemoglobin A1C (%)   Date Value   02/12/2018 5.0             ( goal A1C is < 7)   No results found for: LABMICR  LDL Cholesterol (mg/dL)   Date Value   01/30/2020 79   06/19/2019 68       (goal LDL is <100)   AST (U/L)   Date Value   02/05/2020 15     ALT (U/L)   Date Value   02/05/2020 9     BUN (mg/dL)   Date Value   02/18/2020 4 (L)     BP Readings from Last 3 Encounters:   07/08/20 110/76   06/24/20 120/79   06/04/20 135/85          (goal 120/80)    All Future Testing planned in [facility]  Lab Frequency Next Occurrence   PAP SMEAR Once 11/21/2019               Patient Active Problem List:     Asthma     Murmur     Seasonal allergies     Abnormal TSH     Hyperlipidemia     Vitamin D deficiency     Need for pneumococcal vaccination     Enlarged tonsils     Abnormal ultrasound of gallbladder     Colitis     BMI 35.0-35.9,adult     S/P laparoscopic cholecystectomy     Symptomatic cholelithiasis     Adenocarcinoma in situ (AIS) of uterine cervix     Mild mitral regurgitation     Mild left atrial enlargement     RALH BS, Cytologic Washings, STEVAN & Cystoscopy 2/17/20     At high risk for breast cancer     Hepatic flexure mass     Abnormal MRI     Liver hemangioma

## 2020-08-14 ENCOUNTER — HOSPITAL ENCOUNTER (OUTPATIENT)
Age: 40
Setting detail: SPECIMEN
Discharge: HOME | End: 2020-08-14
Payer: MEDICARE

## 2020-08-14 ENCOUNTER — OFFICE VISIT (OUTPATIENT)
Dept: GYNECOLOGIC ONCOLOGY | Age: 40
End: 2020-08-14
Payer: MEDICARE

## 2020-08-14 VITALS
TEMPERATURE: 98.1 F | BODY MASS INDEX: 34.75 KG/M2 | WEIGHT: 190 LBS | HEART RATE: 84 BPM | SYSTOLIC BLOOD PRESSURE: 132 MMHG | DIASTOLIC BLOOD PRESSURE: 89 MMHG

## 2020-08-14 PROCEDURE — 99213 OFFICE O/P EST LOW 20 MIN: CPT | Performed by: OBSTETRICS & GYNECOLOGY

## 2020-08-14 PROCEDURE — G8417 CALC BMI ABV UP PARAM F/U: HCPCS | Performed by: OBSTETRICS & GYNECOLOGY

## 2020-08-14 PROCEDURE — G8427 DOCREV CUR MEDS BY ELIG CLIN: HCPCS | Performed by: OBSTETRICS & GYNECOLOGY

## 2020-08-14 PROCEDURE — 1036F TOBACCO NON-USER: CPT | Performed by: OBSTETRICS & GYNECOLOGY

## 2020-08-14 RX ORDER — CEFUROXIME AXETIL 250 MG/1
TABLET ORAL
COMMUNITY
Start: 2020-07-26 | End: 2020-08-14 | Stop reason: SDUPTHER

## 2020-08-14 ASSESSMENT — ENCOUNTER SYMPTOMS
COUGH: 0
ABDOMINAL PAIN: 0
BACK PAIN: 0
WHEEZING: 0
EYE PROBLEMS: 0
SORE THROAT: 0

## 2020-08-14 NOTE — PROGRESS NOTES
1 [facility], [facility], [address] [facility] [place] present for her adenocarcinoma in situ of the cervix surveillance visit. HPI: Kelsey Pedersen is a 72-year-old  3, para 0, AB 3 female with a history of adenocarcinoma in situ of the endocervix. She had an abnormal Pap on her annual examination 2019. Following this the patient was referred to an OB/GYN and underwent colposcopy 2019. Abnormal epithelium was noted and cervical and endocervical biopsies revealed \"adenocarcinoma in situ\". She was referred to [facility] gynecologic oncology. The patient had a poor reproductive history with 3 previous miscarriages. In addition she had a history of aortic stenosis and pulmonary stenosis and sees a cardiologist on a regular basis. Family history was significant for maternal aunt with breast cancer and a paternal aunt with ovarian cancer. At that point the patient opted for hysterectomy and this was performed on 2020. Both of her tubes were removed but both of her ovaries were left in situ. 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 Final pathology revealed \"focal residual adenocarcinoma in situ\". There was no evidence of invasive cancer. Margins were negative. Endometriosis was documented. Postoperatively the patient has done well. She has no complaints today. She states she can tell which side she ovulates on because of discomfort for a couple of days each month. The patient has had no vaginal bleeding or pelvic discomfort no GI or  symptoms. ROS:  I have personally reviewed and agree with the review of systems done by my ancillary staff in the [facility] documentation.     Physical Exam:    Vitals:    20 0958   BP: 132/89   Site: Left Upper Arm   Position: Sitting   Cuff Size: Medium Adult   Pulse: 84   Temp: 98.1 °F (36.7 °C)   TempSrc: Temporal   Weight: 190 lb (86.2 kg)       General: No distress    CVA: Nontender bilaterally patient does have sciatica and has had injections and procedures performed previously for this. No tenderness on palpation today. Abdomen: Flat. No herniations noted. No rashes. No hepatomegaly. No splenomegaly. No masses or tenderness. No inguinal adenopathy on either side. Extremities: No edema, deformities or phlebitis. No calf tenderness. Pelvic exam: Normal external genitalia. No lesions are seen. Normal-appearing urethra, lower vagina, perineum and anus. A small sized lighted speculum was utilized to view the vagina. There was no blood or discharge noted. Some questionable minimal granulation at the extreme upper vagina. A Pap scraping was taken of this. Bimanual examination did not reveal any nodularity or other pathology in the pelvis. There was no tenderness. Assessment/Plan:  Cancer Staging  Adenocarcinoma in situ (AIS) of uterine cervix  Staging form: Cervix Uteri, AJCC 8th Edition  - Clinical stage from 2/20/2020: ycT0, cM0 - Signed by Lashonda Ferrari MD on 2/20/2020  Staging comments: This was a small area of adenocarcinoma in situ. Cone biopsy revealed no residual disease. All margins clear. Pap smear was positive for P 16 high risk HPV  - Pathologic stage from 2/20/2020: Stage Unknown (ypT0, pNX, cM0) - Signed by Lashonda Ferrari MD on 2/20/2020  Staging comments: Patient had an adenocarcinoma in situ. Subsequent cold knife cone biopsy revealed no residual disease. All margins negative. Plan:     #1 await result of Pap smear taken today    #2 we will see the patient again in 4 months for follow-up evaluation. I spent 15 minutes providing care to the patient and >50% of the time was spent counseling and coordinating care, discussing the patient's current situation, reviewing her options, counseling and education her and answering her questions.   The patient's pertinent images, labs, and previous records and procedures were reviewed.     Electronically signed by Irina Zelaya MD on 8/14/20 at 10:25 AM EDT

## 2020-08-14 NOTE — PROGRESS NOTES
Review of Systems   Constitutional: Negative for chills. HENT:   Negative for sore throat. Eyes: Negative for eye problems. Pt [place] Glasses    Respiratory: Negative for cough and wheezing. Cardiovascular: Negative for leg swelling. Gastrointestinal: Negative for abdominal pain. Endocrine: Negative for hot flashes. Sometimes   Genitourinary: Negative for pelvic pain. Musculoskeletal: Negative for back pain. Skin: Negative. Neurological: Negative for dizziness, headaches, light-headedness and seizures. Hematological: Negative. Psychiatric/Behavioral: Negative for depression. The patient is not nervous/anxious.

## 2020-08-17 ENCOUNTER — TELEPHONE (OUTPATIENT)
Dept: GYNECOLOGIC ONCOLOGY | Age: 40
End: 2020-08-17

## 2020-08-18 LAB — CYTOLOGY REPORT: NORMAL

## 2020-08-21 ENCOUNTER — OFFICE VISIT (OUTPATIENT)
Dept: DERMATOLOGY | Age: 40
End: 2020-08-21
Payer: MEDICARE

## 2020-08-21 VITALS
DIASTOLIC BLOOD PRESSURE: 73 MMHG | OXYGEN SATURATION: 97 % | SYSTOLIC BLOOD PRESSURE: 107 MMHG | TEMPERATURE: 98.8 F | HEIGHT: 62 IN | HEART RATE: 88 BPM | WEIGHT: 198.8 LBS | BODY MASS INDEX: 36.58 KG/M2

## 2020-08-21 PROCEDURE — G8427 DOCREV CUR MEDS BY ELIG CLIN: HCPCS | Performed by: DERMATOLOGY

## 2020-08-21 PROCEDURE — 99202 OFFICE O/P NEW SF 15 MIN: CPT | Performed by: DERMATOLOGY

## 2020-08-21 PROCEDURE — G8417 CALC BMI ABV UP PARAM F/U: HCPCS | Performed by: DERMATOLOGY

## 2020-08-21 PROCEDURE — 1036F TOBACCO NON-USER: CPT | Performed by: DERMATOLOGY

## 2020-08-21 RX ORDER — CLINDAMYCIN PHOSPHATE 10 UG/ML
LOTION TOPICAL
Qty: 60 ML | Refills: 3 | Status: SHIPPED | OUTPATIENT
Start: 2020-08-21 | End: 2021-12-07

## 2020-08-21 NOTE — PROGRESS NOTES
Dermatology Patient Note  [id number]  [address]  Dept: [phone]  Dept Fax: [phone]      VISITDATE: 8/21/2020   REFERRING PROVIDER: Jazmyne Roa. Haritha Webb is a 42 y.o. female  who presents today in the office for:    New Patient (lesion on the left breast not itchy/painful. it has been there off and on all the time.  she staes that it is under the breast. )      HISTORY OF PRESENT ILLNESS:  42 y.o. female presenting for bumps  Location: under breasts  Duration: on and off for years  Symptoms: sometimes painful  Course: waxes and wanes  Exacerbating factors: unsure  Prior treatments: none    Also complains of irritated moles on posterior neck and back      CURRENT MEDICATIONS:   Current Outpatient Medications   Medication Sig Dispense Refill    benzoyl peroxide 5 % external liquid Wash face, chest and back 1-2 times daily 227 g 3    clindamycin (CLEOCIN T) 1 % lotion Apply to face, chest and back daily 60 mL 3    loratadine (CLARITIN) 10 MG tablet take 1 tablet by mouth once daily 30 tablet 1    albuterol sulfate  (90 Base) MCG/ACT inhaler inhale 2 puffs by mouth and INTO THE LUNGS every 6 hours if needed for wheezing or shortness of breath 8.5 g 0    atorvastatin (LIPITOR) 20 MG tablet take 1 tablet by mouth once daily 90 tablet 1    ondansetron (ZOFRAN) 4 MG tablet Take 1 tablet by mouth every 8 hours as needed for Nausea or Vomiting 10 tablet 3    SUMAtriptan (IMITREX) 6 MG/0.5ML SOLN injection Inject 6 mg into the skin as needed (migraine) No more than 2 doses in a 24 hr period 1 mL 5    Erenumab-aooe (AIMOVIG) 140 MG/ML SOAJ Inject 140 mg into the skin every 30 days 1 pen 5    SUMAtriptan (IMITREX) 100 MG tablet Take 1 tablet by mouth once as needed for Migraine No more than 2 doses in 24 hrs 10 tablet 3    omeprazole (PRILOSEC) 20 MG delayed release capsule Take 1 capsule by mouth Daily 90 capsule 1    docusate sodium (COLACE, DULCOLAX) 100 MG CAPS Take 100 mg by mouth 2 times daily (Patient taking differently: Take 100 mg by mouth 2 times daily prn) 60 capsule 0    Cholecalciferol (RA VITAMIN D-3) 50 MCG (2000 UT) CAPS take 1 capsule by mouth once daily 30 capsule 1    Aspirin-Acetaminophen-Caffeine (EXCEDRIN EXTRA STRENGTH PO) Take by mouth as needed Stopped 2/2/2020      azelastine (OPTIVAR) 0.05 % ophthalmic solution 1 drop as needed      Acetaminophen (TYLENOL EXTRA STRENGTH PO) Take 2 tablets by mouth as needed      tiZANidine (ZANAFLEX) 4 MG tablet Take 4 mg by mouth every 8 hours as needed      lidocaine (XYLOCAINE) 5 % ointment Apply topically as needed. 1 Tube 0    fluticasone (FLONASE) 50 MCG/ACT nasal spray 2 sprays by Nasal route daily as needed       diphenhydrAMINE (BENADRYL) 25 MG tablet Take 25 mg by mouth every 6 hours as needed for Itching       No current facility-administered medications for this visit. ALLERGIES:   Allergies   Allergen Reactions    Decadron [Dexamethasone] Shortness Of Breath     Hives and hot flashes      Prednisone      CAUSES HEADACHE, RASH, hard to breathe    Benzocaine      Mouth numbing medicine- gums and lips swell    Food Hives     Oysters, clams, mussels, scallops    Silicone      Swell up    Tetanus Toxoids      Hives, arm swells up    Cyclopentasiloxane Itching and Swelling    Dimethicone Itching and Swelling    Dimethiconol Itching and Swelling    Bactrim [Sulfamethoxazole-Trimethoprim] Hives    Bee Venom Swelling    Cortisone      HIVES    Seasonal      Hayfever        SOCIAL HISTORY:  Social History     Tobacco Use    Smoking status: Former Smoker     Types: Cigarettes    Smokeless tobacco: Never Used    Tobacco comment: half pack a month quit 2006   Substance Use Topics    Alcohol use:  Yes     Alcohol/week: 0.0 standard drinks     Comment: social        REVIEW OF SYSTEMS:  Review of Systems  Skin: Denies any new changing, growing orbleeding lesions or rashes except as described in the HPI   Constitutional: Denies fevers, chills, and malaise. PHYSICAL EXAM:   /73 (Site: Right Upper Arm, Position: Sitting, Cuff Size: Large Adult)   Pulse 88   Temp 98.8 °F (37.1 °C)   Ht 5' 2\" (1.575 m)   Wt 198 lb 12.8 oz (90.2 kg)   LMP 01/09/2020   SpO2 97%   BMI 36.36 kg/m²     General Exam:  General Appearance: No acute distress, Well nourished     Neuro: Alert and oriented to person, place and time  Psych: Normal affect   Lymph Node: Not performed    Cutaneous Exam: Performed as documented in clinic note below. Waist-up skin, which includes the head/face,neck, both arms, chest, back, abdomen, digits and/or nails, was examined. Pertinent Physical Exam Findings:  Physical Exam  submammmary skin with excoriated inflamed follicular papules  Posterior neck and lower back with inflamed nevi    Photo surveillance performed: Yes    Medical Necessity of Exam Performed:   Distribution of patient concerns    Additional Diagnostic Testing performed during exam: Not performed ,  Not performed    ASSESSMENT:   Diagnosis Orders   1. Chronic folliculitis  benzoyl peroxide 5 % external liquid    clindamycin (CLEOCIN T) 1 % lotion   2. Irritated nevus         Plan of Action is as Follows:  Assessment   1. Chronic folliculitis/mild HS  - discussed diagnosis, etiology, natural course, and treatment options  - benzoyl peroxide 5 % external liquid; Wash face, chest and back 1-2 times daily  Dispense: 227 g; Refill: 3  - clindamycin (CLEOCIN T) 1 % lotion; Apply to face, chest and back daily  Dispense: 60 mL; Refill: 3    2.  Irritated nevus x 2  - schedule for shave removal (patient defers today)    RTC for #2            Patient Instructions   - Benzoyl peroxide wash daily; leave on skin for a few minutes prior to rinsing to HS prone areas  - Clindamycin lotion daily to HS prone areas  - Return for irritated mole shave

## 2020-08-21 NOTE — PATIENT INSTRUCTIONS
- Benzoyl peroxide wash daily; leave on skin for a few minutes prior to rinsing to HS prone areas  - Clindamycin lotion daily to HS prone areas  - Return for irritated mole shave biopsy    Examples of BPO wash include: Panoxyl Wash, Acne Free brand oil-free acne cleanser, Neutrogena Clear Pore Cleanser/Mask, Clean and Clear advantage 3 in 1 exfoliating cleanser, Clean and Clear Continuous Control Acne Cleanser, Oxy maximum face wash).

## 2020-09-23 RX ORDER — ACETAMINOPHEN 160 MG
TABLET,DISINTEGRATING ORAL
Qty: 30 CAPSULE | Refills: 1 | Status: SHIPPED | OUTPATIENT
Start: 2020-09-23 | End: 2020-11-24

## 2020-09-23 RX ORDER — LORATADINE 10 MG/1
TABLET ORAL
Qty: 30 TABLET | Refills: 1 | Status: SHIPPED | OUTPATIENT
Start: 2020-09-23 | End: 2020-11-24

## 2020-11-12 ENCOUNTER — OFFICE VISIT (OUTPATIENT)
Dept: DERMATOLOGY | Age: 40
End: 2020-11-12
Payer: MEDICARE

## 2020-11-12 ENCOUNTER — HOSPITAL ENCOUNTER (OUTPATIENT)
Age: 40
Setting detail: SPECIMEN
Discharge: HOME | End: 2020-11-12
Payer: MEDICARE

## 2020-11-12 VITALS
HEART RATE: 76 BPM | OXYGEN SATURATION: 97 % | HEIGHT: 62 IN | TEMPERATURE: 97.9 F | BODY MASS INDEX: 35.26 KG/M2 | SYSTOLIC BLOOD PRESSURE: 107 MMHG | WEIGHT: 191.6 LBS | DIASTOLIC BLOOD PRESSURE: 72 MMHG

## 2020-11-12 PROCEDURE — 11300 SHAVE SKIN LESION 0.5 CM/<: CPT | Performed by: DERMATOLOGY

## 2020-11-12 PROCEDURE — 11306 SHAVE SKIN LESION 0.6-1.0 CM: CPT | Performed by: DERMATOLOGY

## 2020-11-12 RX ORDER — LIDOCAINE HYDROCHLORIDE AND EPINEPHRINE 10; 10 MG/ML; UG/ML
1 INJECTION, SOLUTION INFILTRATION; PERINEURAL ONCE
Status: COMPLETED | OUTPATIENT
Start: 2020-11-12 | End: 2020-11-12

## 2020-11-12 RX ADMIN — LIDOCAINE HYDROCHLORIDE AND EPINEPHRINE 1 ML: 10; 10 INJECTION, SOLUTION INFILTRATION; PERINEURAL at 09:20

## 2020-11-12 NOTE — PROGRESS NOTES
Chief Complaint   Patient presents with    Follow-up     shave biopsies on the back     Patient presents for shave removal of irritated nevi on lower back and posterior neck    Dermatology Procedure Note   [id number]  [facility] [place], [place] [address]  Dept: [phone]  Dept Fax: [phone]      Procedure Date: 11/12/2020  Procedure Time: 8:54 AM    Procedure Practitioner: Courtney Galvan MD    Procedure: Shave removal    Pre-Procedure Diagnosis: Irritated nevus    Post-Procedure Diagnosis: Same as Pre-Procedure Diagnosis    Informed Consent: The procedure and its risks were explained including but not limited to pain, bleeding, infection, permanent scar, permanent pigment alteration and recurrence. Consent to proceed with the procedure was obtained from the patient or the parent by the practitioner    Time Out:  A time out was conducted immediately before starting the procedure that confirmed a final verification of the correct patient, correct procedure, and correct site. Procedure Details:  Shave Removal x 2: The procedure and its risks were explained including but not limited to pain, bleeding, infection, permanent scar, permanent pigment alteration and need for an additional procedure. Consent to proceed with the procedure was obtained from the patient or the parent. After cleaning with alcohol the lesions (7 mm of posterior neck, 5 mm of lower back) were anesthetized with 1% lidocaine with epinephrine and removed with a dermablade and curette. Hemostasis was achieved with aluminum chloride and Vaseline and a bandage were applied.     Procedure Performed By: Courtney Galvan MD    Estimated Blood Loss: Minimal    Pathologic Specimen: H&E    Procedure Tolerance: Good    Complication(s): None    Electronically signed by Courtney Galvan MD on 11/12/20 at 8:54 AM EST

## 2020-11-16 LAB — DERMATOLOGY PATHOLOGY REPORT: NORMAL

## 2020-11-24 RX ORDER — LORATADINE 10 MG/1
TABLET ORAL
Qty: 30 TABLET | Refills: 1 | Status: SHIPPED | OUTPATIENT
Start: 2020-11-24 | End: 2021-01-22

## 2020-11-24 RX ORDER — GLUCOSAMINE/CHONDR SU A SOD 750-600 MG
TABLET ORAL
Qty: 30 CAPSULE | Refills: 1 | Status: SHIPPED | OUTPATIENT
Start: 2020-11-24 | End: 2021-01-22

## 2020-12-11 ENCOUNTER — OFFICE VISIT (OUTPATIENT)
Dept: GYNECOLOGIC ONCOLOGY | Age: 40
End: 2020-12-11
Payer: MEDICARE

## 2020-12-11 VITALS
HEIGHT: 62 IN | DIASTOLIC BLOOD PRESSURE: 85 MMHG | TEMPERATURE: 97 F | WEIGHT: 191 LBS | OXYGEN SATURATION: 97 % | SYSTOLIC BLOOD PRESSURE: 126 MMHG | HEART RATE: 85 BPM | BODY MASS INDEX: 35.15 KG/M2

## 2020-12-11 PROCEDURE — G8417 CALC BMI ABV UP PARAM F/U: HCPCS | Performed by: OBSTETRICS & GYNECOLOGY

## 2020-12-11 PROCEDURE — G8428 CUR MEDS NOT DOCUMENT: HCPCS | Performed by: OBSTETRICS & GYNECOLOGY

## 2020-12-11 PROCEDURE — G8484 FLU IMMUNIZE NO ADMIN: HCPCS | Performed by: OBSTETRICS & GYNECOLOGY

## 2020-12-11 PROCEDURE — 99213 OFFICE O/P EST LOW 20 MIN: CPT | Performed by: OBSTETRICS & GYNECOLOGY

## 2020-12-11 PROCEDURE — 1036F TOBACCO NON-USER: CPT | Performed by: OBSTETRICS & GYNECOLOGY

## 2020-12-11 ASSESSMENT — ENCOUNTER SYMPTOMS
BACK PAIN: 1
ABDOMINAL PAIN: 1
NAUSEA: 1

## 2020-12-11 NOTE — PROGRESS NOTES
Review of Systems   Gastrointestinal: Positive for abdominal pain (RLQ cramping states from ovulating) and nausea (d/t migraines). Endocrine: Positive for hot flashes. Musculoskeletal: Positive for back pain (chronic) and flank pain. Skin: Positive for itching (on back form mole removal site). Neurological: Positive for dizziness, headaches, light-headedness, numbness and seizures. At times,states PCP is aware   All other systems reviewed and are negative.

## 2020-12-11 NOTE — PROGRESS NOTES
[address] [facility], [facility], [address] [facility] [address] Ene Crews present for he cervical adenocarcinoma in situ r  surveillance visit. CC: Cervical adenocarcinoma in situ    HPI: Sarah Armstrong is a 45-year-old  3, para 3, AB 3 female with a history of adenocarcinoma in situ of the endocervix. She had an abnormal Pap on her annual exam 2019. The patient was referred to an OB/GYN and had colposcopy on 2019. Cervical and endocervical biopsies revealed \"adenocarcinoma in situ\". She was referred to Dr. Felicita Lucas at [address] [place] [address] gynecologic oncology. Patient had a poor reproductive history with 3 miscarriages. In addition she has a history of aortic stenosis and pulmonary stenosis. The patient opted to have a hysterectomy and this was performed on 2020. Both of her tubes were removed but both ovaries were left in situ. There was some evidence of pelvic endometriosis as well. Final pathology revealed \"focal residual adenocarcinoma in situ\". There was no evidence of invasive cancer and margins were negative. Endometriosis was documented pathologically. The patient has done well since that time. She has no pelvic pain and has had no vaginal bleeding. At her last visit on 2020 a Pap smear was performed that was within normal limits. ROS:  I have personally reviewed and agree with the review of systems done by my ancillary staff in the [facility] documentation. Physical Exam:    Vitals:    20 0821   BP: 126/85   Pulse: 85   Temp: 97 °F (36.1 °C)   TempSrc: Temporal   SpO2: 97%   Weight: 191 lb (86.6 kg)   Height: 5' 2\" (1.575 m)       General: No distress    Pelvic exam: No inguinal adenopathy on either side. External genitalia normal female. No lesions are seen. Normal-appearing urethra, lower vagina, perineum and anus. A medium size lighted speculum was utilized to view the vagina.   There was no blood or discharge in the vault. No lesions were seen. A Pap smear was therefore not taken. Bimanual examination reveals no nodularity or other pelvic pathology. Assessment/Plan:  Cancer Staging  Adenocarcinoma in situ (AIS) of uterine cervix  Staging form: Cervix Uteri, AJCC 8th Edition  - Clinical stage from 2/20/2020: ycT0, cM0 - Signed by Carmen Lynn MD on 2/20/2020  Staging comments: This was a small area of adenocarcinoma in situ. Cone biopsy revealed no residual disease. All margins clear. Pap smear was positive for P 16 high risk HPV  - Pathologic stage from 2/20/2020: Stage Unknown (ypT0, pNX, cM0) - Signed by Carmen Lynn MD on 2/20/2020  Staging comments: Patient had an adenocarcinoma in situ. Subsequent cold knife cone biopsy revealed no residual disease. All margins negative. Her Surveillance plan is as follows: We will see her again in 4 months      I spent 15 minutes providing care to the patient and >50% of the time was spent counseling and coordinating care, discussing the patient's current situation, reviewing her options, counseling and education her and answering her questions. The patient's pertinent images, labs, and previous records and procedures were reviewed.     Electronically signed by Cody Gunn MD on 12/11/20 at 8:40 AM EST

## 2020-12-15 ENCOUNTER — OFFICE VISIT (OUTPATIENT)
Dept: NEUROLOGY | Age: 40
End: 2020-12-15
Payer: MEDICARE

## 2020-12-15 VITALS
DIASTOLIC BLOOD PRESSURE: 81 MMHG | SYSTOLIC BLOOD PRESSURE: 117 MMHG | TEMPERATURE: 97.9 F | WEIGHT: 196.6 LBS | HEIGHT: 62 IN | BODY MASS INDEX: 36.18 KG/M2 | HEART RATE: 80 BPM

## 2020-12-15 PROCEDURE — G8427 DOCREV CUR MEDS BY ELIG CLIN: HCPCS | Performed by: PSYCHIATRY & NEUROLOGY

## 2020-12-15 PROCEDURE — G8417 CALC BMI ABV UP PARAM F/U: HCPCS | Performed by: PSYCHIATRY & NEUROLOGY

## 2020-12-15 PROCEDURE — 1036F TOBACCO NON-USER: CPT | Performed by: PSYCHIATRY & NEUROLOGY

## 2020-12-15 PROCEDURE — 99214 OFFICE O/P EST MOD 30 MIN: CPT | Performed by: PSYCHIATRY & NEUROLOGY

## 2020-12-15 PROCEDURE — G8484 FLU IMMUNIZE NO ADMIN: HCPCS | Performed by: PSYCHIATRY & NEUROLOGY

## 2020-12-15 NOTE — PROGRESS NOTES
[facility]  Offices: [address], [place], 309 [facility]  [address] [facility], [address], [place], 183 [facility]  [address] [place] [address] [place], [facility], [facility] [address]  Phone: [phone]  Fax: [phone]    MD Bhavani Lancaster, MD Nieves Minor, MD Rl Krishnamurthy, MD Soraya Hou, MD Oswaldo Cortés, CNP    12/15/2020      HISTORY OF PRESENT ILLNESS:       I had the pleasure of seeing Jovanny Tenorio, who returns for continuing neurologic care for Chronic migraine without aura, onset in 2012. She is currently on Aimovig 140 mg SQ monthly for prophylaxis, takes Imitrex 100 mg p.o. for rescue with 6 mg SQ for backup. Today, patient reports she continues to do well. She reports a migraine frequency of 2 to 3/month, was previously having 2 to 3/week. She denies any constipation or injection site reactions with Aimovig. She does report good relief of her headache with Imitrex p.o., denies any side effects with it as well. Her headaches are typically located in the bifrontal and bitemporal areas, described as a pounding and throbbing pain with a severity of 5-8/10. Cold weather usually triggers her headaches and they are associated with nausea as well as photophobia and phonophobia. Headaches are typically aggravated by bright light in physical activity, and partially relieved by rest and sleep. Patient denies any new complaints, no new medications. Prior medication trials: Topamax 125 mg twice a day ( severe paresthesias), amitriptyline 100 mg (worked initially)     Prior testing reviewed:  EMG 9/16/2019: This is a normal electrophysiological study. Yulia Martinez is no evidence of large fiber sensorimotor neuropathy.  Clinical correlation is recommended.   EMG 12/4/17: No evidence of neuropathy, plexopathy, radiculopathy or myopathy On the right upper and lower extremities  MRI brain without contrast 3/14/2016: No acute intracranial abnormality PAST MEDICAL HISTORY:         Diagnosis Date    Allergic rhinitis     Aortic stenosis     SEES DR Justo Johnson (AS since birth-used to follow with peds cardiology)    Arthritis     Asthma     GERD (gastroesophageal reflux disease)     History of echocardiogram 2016    Hyperlipidemia 9/15/2015    Movement disorder     Neuropathy     left LE with numbness    Numbness and tingling     HANDS AND FEET/SEES DR. Diana ramirez ([facility] [address]     last time 11/2019    Prolonged emergence from general anesthesia     Psoriasis     Pulmonary stenosis     Ringing in ears     SEES AN ENT    Sacroiliac joint dysfunction of both sides 06/18/2019    Sacroiliitis ([facility] [address] 06/18/2019    Wears glasses         PAST SURGICAL HISTORY:         Procedure Laterality Date    COLONOSCOPY  01/08/2018    COLON, RANDOM BIOPSIES: MILD FOCAL ACTIVE COLITIS, HEMORRHOIDS    COLPOSCOPY  12/20/2019    Dr. Alfonso Alford N/A 2/17/2020    LAPAROSCOPIC XI ROBOTIC TOTAL HYSTERECTOMY, BILATERAL SALPINGECTOMY, CYTOLOGIC WASHINGS, CYSTOSCOPY performed by Herson Nelson MD at [address] [place] [address]  2008    NERVE BLOCK  06/2019    NERVE BLOCK  07/2019    [place] COLONOSCOPY W/BIOPSY SINGLE/MULTIPLE N/A 1/8/2018    COLONOSCOPY WITH BIOPSY performed by Melody Guzman MD at [address] EGD TRANSORAL BIOPSY SINGLE/MULTIPLE N/A 1/8/2018    EGD BIOPSY performed by Melody Guzman MD at [facility] N/A 4/27/2018    [address] performed by Jorge Luis Talley MD at [facility]  02/17/2020    WISDOM TOOTH EXTRACTION  1/8/16        SOCIAL HISTORY:     Social History     Socioeconomic History    Marital status:      Spouse name: Not on file    Number of children: Not on file    Years of education: Not on file    Highest education level: Not on file   Occupational History    Not on file   Social Needs  Financial resource strain: Not on file   Teresa-Erika insecurity     Worry: Not on file     Inability: Not on file   Vigiglobe needs     Medical: Not on file     Non-medical: Not on file   Tobacco Use    Smoking status: Former Smoker     Types: Cigarettes    Smokeless tobacco: Never Used    Tobacco comment: half pack a month quit 2006   Substance and Sexual Activity    Alcohol use:  Yes     Alcohol/week: 0.0 standard drinks     Comment: social     Drug use: Yes     Types: Marijuana     Comment: edible cannabis for pain    Sexual activity: Yes     Partners: Male   Lifestyle    Physical activity     Days per week: Not on file     Minutes per session: Not on file    Stress: Not on file   Relationships    Social connections     Talks on phone: Not on file     Gets together: Not on file     Attends Tenriism service: Not on file     Active member of club or organization: Not on file     Attends meetings of clubs or organizations: Not on file     Relationship status: Not on file    Intimate partner violence     Fear of current or ex partner: Not on file     Emotionally abused: Not on file     Physically abused: Not on file     Forced sexual activity: Not on file   Other Topics Concern    Not on file   Social History Narrative    Not on file       FAMILY MEDICAL HISTORY:     Family History   Problem Relation Age of Onset    Diabetes Father    [facility] Other Mother         blood disorder    Other Maternal Aunt         blood disorder    Cancer Maternal Aunt         breast    Breast Cancer Maternal Aunt     Other Paternal Aunt         chiari malformation type 2     Cancer Paternal Aunt         cervical cancer    Cancer Maternal Grandmother         uterine cancer     Cancer Maternal Grandfather     High Blood Pressure Paternal Grandfather     Heart Disease Paternal Grandfather     Cancer Paternal Grandfather     Other Maternal Aunt         brain aneurysm        CURRENT MEDICATIONS: Current Outpatient Medications   Medication Sig Dispense Refill    loratadine (RA LORATADINE) 10 MG tablet take 1 tablet by mouth once daily 30 tablet 1    Cholecalciferol (RA VITAMIN D-3) 50 MCG (2000 UT) CAPS take 1 capsule by mouth once daily 30 capsule 1    benzoyl peroxide 5 % external liquid Wash face, chest and back 1-2 times daily 227 g 3    clindamycin (CLEOCIN T) 1 % lotion Apply to face, chest and back daily 60 mL 3    albuterol sulfate  (90 Base) MCG/ACT inhaler inhale 2 puffs by mouth and INTO THE LUNGS every 6 hours if needed for wheezing or shortness of breath 8.5 g 0    atorvastatin (LIPITOR) 20 MG tablet take 1 tablet by mouth once daily 90 tablet 1    ondansetron (ZOFRAN) 4 MG tablet Take 1 tablet by mouth every 8 hours as needed for Nausea or Vomiting 10 tablet 3    SUMAtriptan (IMITREX) 6 MG/0.5ML SOLN injection Inject 6 mg into the skin as needed (migraine) No more than 2 doses in a 24 hr period 1 mL 5    Erenumab-aooe (AIMOVIG) 140 MG/ML SOAJ Inject 140 mg into the skin every 30 days 1 pen 5    omeprazole (PRILOSEC) 20 MG delayed release capsule Take 1 capsule by mouth Daily 90 capsule 1    Aspirin-Acetaminophen-Caffeine (EXCEDRIN EXTRA STRENGTH PO) Take by mouth as needed       azelastine (OPTIVAR) 0.05 % ophthalmic solution 1 drop as needed      Acetaminophen (TYLENOL EXTRA STRENGTH PO) Take 2 tablets by mouth as needed      tiZANidine (ZANAFLEX) 4 MG tablet Take 4 mg by mouth every 8 hours as needed      lidocaine (XYLOCAINE) 5 % ointment Apply topically as needed. 1 Tube 0    fluticasone (FLONASE) 50 MCG/ACT nasal spray 2 sprays by Nasal route daily as needed       diphenhydrAMINE (BENADRYL) 25 MG tablet Take 25 mg by mouth every 6 hours as needed for Itching      SUMAtriptan (IMITREX) 100 MG tablet Take 1 tablet by mouth once as needed for Migraine No more than 2 doses in 24 hrs 10 tablet 3     No current facility-administered medications for this visit. ALLERGIES:     Allergies   Allergen Reactions    Decadron [Dexamethasone] Shortness Of Breath     Hives and hot flashes      Prednisone      CAUSES HEADACHE, RASH, hard to breathe    Benzocaine      Mouth numbing medicine- gums and lips swell    Food Hives     Oysters, clams, mussels, scallops    Silicone      Swell up    Tetanus Toxoids      Hives, arm swells up    Cyclopentasiloxane Itching and Swelling    Dimethicone Itching and Swelling    Dimethiconol Itching and Swelling    Bactrim [Sulfamethoxazole-Trimethoprim] Hives    Bee Venom Swelling    Cortisone      HIVES    Seasonal      Hayfever                              REVIEW OF SYSTEMS     All items selected indicate a positive finding. Those items not selected are negative. Constitutional [] Weight loss/gain   [x] Fatigue  [] Fever/Chills   HEENT [] Hearing Loss  [] Visual Disturbance  [] Tinnitus  [] Eye pain   Respiratory [] Shortness of Breath  [] Cough  [] Snoring   Cardiovascular [] Chest Pain  [] Palpitations  [] Lightheaded   GI [] Constipation  [] Diarrhea  [] Swallowing change    [] Urinary Frequency  [] Urinary Urgency   Musculoskeletal [] Neck pain  [x] Back pain  [] Muscle pain  [] Restless legs   Dermatologic [] Skin changes   Neurologic [] Memory loss/confusion  [] Seizures  [] Trouble walking or imbalance  [] Dizziness  [] Weakness  [] Numbness  [] Tremors  [] Speech Difficulty  [x] Headaches  [] Light Sensitivity  [] Sound Sensitivity   Endocrinology []Excessive thirst  []Excessive hunger   Psychiatric [] Anxiety/Depression  [] Hallucination   Allergy/immunology []Hives/environmental allergies   Hematologic/lymph [] Abnormal bleeding  [] Abnormal bruising         PHYSICAL EXAMINATION:       Vitals:    12/15/20 1004   BP: 117/81   Pulse: 80   Temp: 97.9 °F (36.6 °C)                                              . General Appearance:  Alert, cooperative, no signs of distress, appears stated age   Head:  Normocephalic, no signs of trauma   Eyes:  Conjunctiva/corneas clear;  eyelids intact   Ears:  Normal external ear and canals   Nose: Nares normal, mucosa normal, no drainage    Throat: Lips and tongue normal; teeth normal;  gums normal   Neck: Supple, intact flexion, extension and rotation;   trachea midline;  no adenopathy;   thyroid: not enlarged;   no carotid pulse abnormality   Back:   Symmetric, no curvature, ROM adequate   Lungs:   Respirations unlabored   Heart:  Regular rate and rhythm           Extremities: Extremities normal, no cyanosis, no edema   Pulses: Symmetric over head and neck   Skin: Skin color, texture normal, no rashes, no lesions                                     NEUROLOGIC EXAMINATION      Mental status    Alert and oriented x 3; intact memory with no confusion, speech or language problems; no hallucinations or delusions  Fund of information appropriate for level of education    Cranial nerves    II - visual fields intact to confrontation , fundoscopy showed no  papiledema                                                III, IV, VI  extra-ocular muscles full: no pupillary defect; no ROBBIE, no nystagmus, no ptosis   V - normal facial sensation                                                               VII - normal facial symmetry                                                             VIII - intact hearing                                                                             IX, X - symmetrical palate                                                                  XI - symmetrical shoulder shrug                                                       XII - tongue midline without atrophy or fasciculation      Motor function  Normal muscle bulk and tone; strength 5/5 on all 4 extremities, no pronator drift      Sensory function Intact to light touch, pinprick on all 4 extremities

## 2020-12-23 ENCOUNTER — HOSPITAL ENCOUNTER (OUTPATIENT)
Dept: MAMMOGRAPHY | Age: 40
Discharge: HOME | End: 2020-12-25
Payer: MEDICARE

## 2020-12-23 PROCEDURE — 77063 BREAST TOMOSYNTHESIS BI: CPT

## 2020-12-28 RX ORDER — ERENUMAB-AOOE 140 MG/ML
INJECTION, SOLUTION SUBCUTANEOUS
Qty: 1 ML | Refills: 5 | Status: SHIPPED | OUTPATIENT
Start: 2020-12-28 | End: 2021-07-01 | Stop reason: SDUPTHER

## 2020-12-28 RX ORDER — ATORVASTATIN CALCIUM 20 MG/1
TABLET, FILM COATED ORAL
Qty: 90 TABLET | Refills: 1 | Status: SHIPPED | OUTPATIENT
Start: 2020-12-28 | End: 2021-01-04 | Stop reason: SDUPTHER

## 2020-12-28 NOTE — TELEPHONE ENCOUNTER
Pharmacy requesting refill of Aimovig 140 mg..      Medication active on med list yes      Date of last fill: 6/4/2020  with 5 refills verified on 12/28/2020  verified by LUCY, RN      Date of last appointment     Next Visit Date: 12/21/2021  Dr Clinton Smith is out of the office this week.

## 2020-12-28 NOTE — TELEPHONE ENCOUNTER
Electronic medication refill request. Pharmacy on file. Please advise.         Next Visit Date:  Future Appointments   Date Time Provider Skylar Foster   4/16/2021  8:30 AM [facility]   12/21/2021  9:00 AM 2908 MD Julia Chiang [address] Maintenance   Topic Date Due    Flu vaccine (1) 09/01/2020    Lipid screen  01/30/2021    Breast cancer screen  11/14/2022    Pneumococcal 0-64 years Vaccine  Completed    Hepatitis C screen  Completed    HIV screen  Completed    Hepatitis A vaccine  Aged Out    Hepatitis B vaccine  Aged Out    Hib vaccine  Aged Out    Meningococcal (ACWY) vaccine  Aged Out    Varicella vaccine  Discontinued       Hemoglobin A1C (%)   Date Value   02/12/2018 5.0             ( goal A1C is < 7)   No results found for: LABMICR  LDL Cholesterol (mg/dL)   Date Value   01/30/2020 79   06/19/2019 68       (goal LDL is <100)   AST (U/L)   Date Value   02/05/2020 15     ALT (U/L)   Date Value   02/05/2020 9     BUN (mg/dL)   Date Value   02/18/2020 4 (L)     BP Readings from Last 3 Encounters:   12/15/20 117/81   12/11/20 126/85   11/12/20 107/72          (goal 120/80)    All Future Testing planned in [facility]  Lab Frequency Next Occurrence   PAP SMEAR Once 08/14/2020   Surgical Pathology Once 11/12/2020               Patient Active Problem List:     Asthma     Murmur     Seasonal allergies     Abnormal TSH     Hyperlipidemia     Vitamin D deficiency     Need for pneumococcal vaccination     Enlarged tonsils     Abnormal ultrasound of gallbladder     Colitis     BMI 35.0-35.9,adult     S/P laparoscopic cholecystectomy     Symptomatic cholelithiasis     Adenocarcinoma in situ (AIS) of uterine cervix     Mild mitral regurgitation     Mild left atrial enlargement     RALH BS, Cytologic Washings, STEVAN & Cystoscopy 2/17/20     At high risk for breast cancer     Hepatic flexure mass     Abnormal MRI     Liver hemangioma

## 2021-01-04 RX ORDER — ATORVASTATIN CALCIUM 20 MG/1
TABLET, FILM COATED ORAL
Qty: 90 TABLET | Refills: 1 | Status: SHIPPED | OUTPATIENT
Start: 2021-01-04 | End: 2021-07-19 | Stop reason: SDUPTHER

## 2021-01-04 NOTE — TELEPHONE ENCOUNTER
Faxed Rx request    Next Visit Date:  Future Appointments   Date Time Provider Skylar Foster   4/16/2021  8:30 AM [facility]   12/21/2021  9:00 AM 2908 MD Juila Chiang 50 Maintenance   Topic Date Due    Flu vaccine (1) 09/01/2020    Lipid screen  01/30/2021    Breast cancer screen  11/14/2022    Pneumococcal 0-64 years Vaccine  Completed    Hepatitis C screen  Completed    HIV screen  Completed    Hepatitis A vaccine  Aged Out    Hepatitis B vaccine  Aged Out    Hib vaccine  Aged Out    Meningococcal (ACWY) vaccine  Aged Out    Varicella vaccine  Discontinued       Hemoglobin A1C (%)   Date Value   02/12/2018 5.0             ( goal A1C is < 7)   No results found for: LABMICR  LDL Cholesterol (mg/dL)   Date Value   01/30/2020 79   06/19/2019 68       (goal LDL is <100)   AST (U/L)   Date Value   02/05/2020 15     ALT (U/L)   Date Value   02/05/2020 9     BUN (mg/dL)   Date Value   02/18/2020 4 (L)     BP Readings from Last 3 Encounters:   12/15/20 117/81   12/11/20 126/85   11/12/20 107/72          (goal 120/80)    All Future Testing planned in [facility]  Lab Frequency Next Occurrence   PAP SMEAR Once 08/14/2020   Surgical Pathology Once 11/12/2020               Patient Active Problem List:     Asthma     Murmur     Seasonal allergies     Abnormal TSH     Hyperlipidemia     Vitamin D deficiency     Need for pneumococcal vaccination     Enlarged tonsils     Abnormal ultrasound of gallbladder     Colitis     BMI 35.0-35.9,adult     S/P laparoscopic cholecystectomy     Symptomatic cholelithiasis     Adenocarcinoma in situ (AIS) of uterine cervix     Mild mitral regurgitation     Mild left atrial enlargement     RALH BS, Cytologic Washings, STEVAN & Cystoscopy 2/17/20     At high risk for breast cancer     Hepatic flexure mass     Abnormal MRI     Liver hemangioma

## 2021-01-05 ENCOUNTER — TELEPHONE (OUTPATIENT)
Dept: NEUROLOGY | Age: 41
End: 2021-01-05

## 2021-01-21 NOTE — TELEPHONE ENCOUNTER
Electronic medication refill request. Pharmacy on file. Please advise.         Next Visit Date:  Future Appointments   Date Time Provider Skylar Foster   4/16/2021  8:30 AM Debby Mccarthy MD GYN Oncology [facility]   12/21/2021  9:00 AM Scooter Hahn MD [facility] 50 Maintenance   Topic Date Due    Flu vaccine (1) 09/01/2020    Lipid screen  01/30/2021    Breast cancer screen  11/14/2022    Pneumococcal 0-64 years Vaccine  Completed    Hepatitis C screen  Completed    HIV screen  Completed    Hepatitis A vaccine  Aged Out    Hepatitis B vaccine  Aged Out    Hib vaccine  Aged Out    Meningococcal (ACWY) vaccine  Aged Out    Varicella vaccine  Discontinued       Hemoglobin A1C (%)   Date Value   02/12/2018 5.0             ( goal A1C is < 7)   No results found for: LABMICR  LDL Cholesterol (mg/dL)   Date Value   01/30/2020 79   06/19/2019 68       (goal LDL is <100)   AST (U/L)   Date Value   02/05/2020 15     ALT (U/L)   Date Value   02/05/2020 9     BUN (mg/dL)   Date Value   02/18/2020 4 (L)     BP Readings from Last 3 Encounters:   12/15/20 117/81   12/11/20 126/85   11/12/20 107/72          (goal 120/80)    All Future Testing planned in [facility]  Lab Frequency Next Occurrence   PAP SMEAR Once 08/14/2020               Patient Active Problem List:     Asthma     Murmur     Seasonal allergies     Abnormal TSH     Hyperlipidemia     Vitamin D deficiency     Need for pneumococcal vaccination     Enlarged tonsils     Abnormal ultrasound of gallbladder     Colitis     BMI 35.0-35.9,adult     S/P laparoscopic cholecystectomy     Symptomatic cholelithiasis     Adenocarcinoma in situ (AIS) of uterine cervix     Mild mitral regurgitation     Mild left atrial enlargement     RALH BS, Cytologic Washings, STEVAN & Cystoscopy 2/17/20     At high risk for breast cancer     Hepatic flexure mass     Abnormal MRI     Liver hemangioma

## 2021-01-22 RX ORDER — LORATADINE 10 MG/1
TABLET ORAL
Qty: 30 TABLET | Refills: 1 | Status: SHIPPED | OUTPATIENT
Start: 2021-01-22 | End: 2021-03-22 | Stop reason: SDUPTHER

## 2021-01-22 RX ORDER — GLUCOSAMINE/CHONDR SU A SOD 750-600 MG
TABLET ORAL
Qty: 30 CAPSULE | Refills: 1 | Status: SHIPPED | OUTPATIENT
Start: 2021-01-22 | End: 2021-03-22 | Stop reason: SDUPTHER

## 2021-02-22 ENCOUNTER — OFFICE VISIT (OUTPATIENT)
Dept: FAMILY MEDICINE CLINIC | Age: 41
End: 2021-02-22
Payer: MEDICARE

## 2021-02-22 VITALS
DIASTOLIC BLOOD PRESSURE: 76 MMHG | WEIGHT: 194 LBS | HEIGHT: 62 IN | TEMPERATURE: 96.9 F | BODY MASS INDEX: 35.7 KG/M2 | SYSTOLIC BLOOD PRESSURE: 112 MMHG

## 2021-02-22 DIAGNOSIS — K52.9 COLITIS: ICD-10-CM

## 2021-02-22 DIAGNOSIS — J30.2 SEASONAL ALLERGIES: ICD-10-CM

## 2021-02-22 DIAGNOSIS — Z13.1 DIABETES MELLITUS SCREENING: ICD-10-CM

## 2021-02-22 DIAGNOSIS — T78.2XXA ANAPHYLAXIS, INITIAL ENCOUNTER: ICD-10-CM

## 2021-02-22 DIAGNOSIS — Z13.220 LIPID SCREENING: ICD-10-CM

## 2021-02-22 DIAGNOSIS — Z13.21 ENCOUNTER FOR VITAMIN DEFICIENCY SCREENING: ICD-10-CM

## 2021-02-22 DIAGNOSIS — E78.5 HYPERLIPIDEMIA, UNSPECIFIED HYPERLIPIDEMIA TYPE: ICD-10-CM

## 2021-02-22 DIAGNOSIS — D72.829 LEUKOCYTOSIS, UNSPECIFIED TYPE: ICD-10-CM

## 2021-02-22 DIAGNOSIS — E55.9 VITAMIN D DEFICIENCY: ICD-10-CM

## 2021-02-22 DIAGNOSIS — E03.9 HYPOTHYROIDISM, UNSPECIFIED TYPE: ICD-10-CM

## 2021-02-22 DIAGNOSIS — J45.20 MILD INTERMITTENT ASTHMA WITHOUT COMPLICATION: Primary | ICD-10-CM

## 2021-02-22 DIAGNOSIS — K21.9 GASTROESOPHAGEAL REFLUX DISEASE WITHOUT ESOPHAGITIS: ICD-10-CM

## 2021-02-22 PROCEDURE — 1036F TOBACCO NON-USER: CPT | Performed by: FAMILY MEDICINE

## 2021-02-22 PROCEDURE — G8417 CALC BMI ABV UP PARAM F/U: HCPCS | Performed by: FAMILY MEDICINE

## 2021-02-22 PROCEDURE — G8484 FLU IMMUNIZE NO ADMIN: HCPCS | Performed by: FAMILY MEDICINE

## 2021-02-22 PROCEDURE — 99214 OFFICE O/P EST MOD 30 MIN: CPT | Performed by: FAMILY MEDICINE

## 2021-02-22 PROCEDURE — G8427 DOCREV CUR MEDS BY ELIG CLIN: HCPCS | Performed by: FAMILY MEDICINE

## 2021-02-22 RX ORDER — OMEPRAZOLE 20 MG/1
20 CAPSULE, DELAYED RELEASE ORAL DAILY
Qty: 90 CAPSULE | Refills: 1 | Status: SHIPPED | OUTPATIENT
Start: 2021-02-22 | End: 2021-08-15

## 2021-02-22 SDOH — HEALTH STABILITY: MENTAL HEALTH: HOW MANY STANDARD DRINKS CONTAINING ALCOHOL DO YOU HAVE ON A TYPICAL DAY?: 1 OR 2

## 2021-02-22 SDOH — HEALTH STABILITY: MENTAL HEALTH: HOW OFTEN DO YOU HAVE A DRINK CONTAINING ALCOHOL?: 2-3 TIMES A WEEK

## 2021-02-22 ASSESSMENT — PATIENT HEALTH QUESTIONNAIRE - PHQ9
SUM OF ALL RESPONSES TO PHQ QUESTIONS 1-9: 0
2. FEELING DOWN, DEPRESSED OR HOPELESS: 0
SUM OF ALL RESPONSES TO PHQ QUESTIONS 1-9: 0
SUM OF ALL RESPONSES TO PHQ QUESTIONS 1-9: 0
1. LITTLE INTEREST OR PLEASURE IN DOING THINGS: 0
1. LITTLE INTEREST OR PLEASURE IN DOING THINGS: 0
SUM OF ALL RESPONSES TO PHQ9 QUESTIONS 1 & 2: 0

## 2021-02-22 NOTE — PROGRESS NOTES
[address] PRIMARY CARE  [address] [place] [address] [address] [place] [address]  Dept: [phone]  Dept Fax: [phone] [facility] Marybeth Thomson is a 47 y.o. female who is a Established patient, who presents today for her medical conditions/complaints as noted below:  Chief Complaint   Patient presents with    [facility]    Hyperlipidemia         HPI:     Álvaro Reddy is a 61-year-old female with past medical history significant for mild mitral regurgitation, asthma, colitis, hyperlipidemia, seasonal allergies, vitamin D deficiency, and an overactive immune system per the patient who presents today to establish care with myself. The patient has history of significant allergies and reactions to medications such as steroids. She is on an EpiPen as needed when she gets stung by a bee. In the past when she has gotten steroids IV she has had significant rashes and shortness of breath. She has had a blood test to assess for allergies but notes she has not had a skin prick test today. She has multiple food allergies and also has had reactions to random allergens. Patient has seen ENT in the past but would like a referral for a second opinion. The patient did undergo a radical hysterectomy due to cervical cancer last year. She follows closely with Dr. Cecily Elena. The patient also has a history of hyperlipidemia for which she is on statin medication. She is under the care of Dr. Juan Asher for chronic migraines. She states her migraines come and go. She is currently on Aimovig, Excedrin as needed, Imitrex as needed.       Hemoglobin A1C (%)   Date Value   02/12/2018 5.0             ( goal A1Cis < 7)   No results found for: LABMICR  LDL Cholesterol (mg/dL)   Date Value   01/30/2020 79   06/19/2019 68   08/31/2018 58       (goal LDL is <100)   AST (U/L)   Date Value   02/05/2020 15     ALT (U/L)   Date Value   02/05/2020 9     BUN (mg/dL)   Date Value   02/18/2020 4 (L)     BP Readings from Last 3 Encounters:   02/22/21 112/76   12/15/20 117/81   12/11/20 126/85          (goal 120/80)    Past Medical History:   Diagnosis Date    Allergic rhinitis     Aortic stenosis     SEES DR Liliane Moreau (AS since birth-used to follow with peds cardiology)    Arthritis     Asthma     Cancer of endocervical canal ([facility] [address] 01/17/2020    GERD (gastroesophageal reflux disease)     History of echocardiogram 2016    Hyperlipidemia 09/15/2015    Movement disorder     Neuropathy     left LE with numbness    Numbness and tingling     HANDS AND FEET/SEES DR. Rocío ramirez ([facility] [address]     last time 11/2019    Prolonged emergence from general anesthesia     Psoriasis     Pulmonary stenosis     Ringing in ears     SEES AN ENT    Sacroiliac joint dysfunction of both sides 06/18/2019    Sacroiliitis ([facility] [address] 06/18/2019    Symptomatic cholelithiasis     Wears glasses       Past Surgical History:   Procedure Laterality Date    COLONOSCOPY  01/08/2018    COLON, RANDOM BIOPSIES: MILD FOCAL ACTIVE COLITIS, HEMORRHOIDS    COLPOSCOPY  12/20/2019    Dr. Rosa Zhang N/A 02/17/2020    LAPAROSCOPIC XI ROBOTIC TOTAL HYSTERECTOMY, BILATERAL SALPINGECTOMY, CYTOLOGIC WASHINGS, CYSTOSCOPY performed by Jeancarlos Tijerina MD at [facility], TOTAL ABDOMINAL  02/17/2020    ovaries are still present    LEEP  2008   [address] BLOCK  06/2019    NERVE BLOCK  07/2019    AK COLONOSCOPY W/BIOPSY SINGLE/MULTIPLE N/A 01/08/2018    COLONOSCOPY WITH BIOPSY performed by Samuel Caro MD at [address] EGD TRANSORAL BIOPSY SINGLE/MULTIPLE N/A 01/08/2018    EGD BIOPSY performed by Samuel Caro MD at [facility] N/A 04/27/2018    [address] performed by Elise Jeffries MD at [facility] EXTRACTION  01/08/2016       Family History   Problem Relation Age of Onset    Diabetes Father     Other Mother         blood disorder    Other Maternal Aunt         blood disorder    Cancer Maternal Aunt         breast    Breast Cancer Maternal Aunt     Other Paternal Aunt         chiari malformation type 2     Cancer Paternal Aunt         cervical cancer    Cancer Maternal Grandmother         uterine cancer     Cancer Maternal Grandfather     High Blood Pressure Paternal Grandfather     Heart Disease Paternal Grandfather     Cancer Paternal Grandfather     Other Maternal Aunt         brain aneurysm       Social History     Tobacco Use    Smoking status: Former Smoker     Packs/day: 0.25     Types: Cigarettes     Start date: 1996     Quit date: 10/1/2006     Years since quittin.4    Smokeless tobacco: Never Used    Tobacco comment: half pack a month quit    Substance Use Topics    Alcohol use: Yes     Frequency: 2-3 times a week     Drinks per session: 1 or 2     Binge frequency: Never     Comment: social       Current Outpatient Medications   Medication Sig Dispense Refill    omeprazole (PRILOSEC) 20 MG delayed release capsule Take 1 capsule by mouth Daily 90 capsule 1    RA VITAMIN D-3 50 MCG ( UT) CAPS take 1 capsule by mouth once daily 30 capsule 1    loratadine (CLARITIN) 10 MG tablet take 1 tablet by mouth once daily 30 tablet 1    atorvastatin (LIPITOR) 20 MG tablet Take one tab po daily 90 tablet 1    AIMOVIG 140 MG/ML SOAJ inject 1 milliliter ( 140 milligrams ) subcutaneously Every Month in THE ABDOMEN,THIGH,OR OUTER AREA OF UPPER ARM 1 mL 5    benzoyl peroxide 5 % external liquid Wash face, chest and back 1-2 times daily 227 g 3    clindamycin (CLEOCIN T) 1 % lotion Apply to face, chest and back daily 60 mL 3    albuterol sulfate  (90 Base) MCG/ACT inhaler inhale 2 puffs by mouth and INTO THE LUNGS every 6 hours if needed for wheezing or shortness of breath 8.5 g 0    ondansetron (ZOFRAN) 4 MG tablet Take 1 tablet by mouth every 8 hours as needed for Nausea or Vomiting 10 tablet 3  SUMAtriptan (IMITREX) 6 MG/0.5ML SOLN injection Inject 6 mg into the skin as needed (migraine) No more than 2 doses in a 24 hr period 1 mL 5    SUMAtriptan (IMITREX) 100 MG tablet Take 1 tablet by mouth once as needed for Migraine No more than 2 doses in 24 hrs 10 tablet 3    Aspirin-Acetaminophen-Caffeine (EXCEDRIN EXTRA STRENGTH PO) Take by mouth as needed       azelastine (OPTIVAR) 0.05 % ophthalmic solution 1 drop as needed      Acetaminophen (TYLENOL EXTRA STRENGTH PO) Take 2 tablets by mouth as needed      tiZANidine (ZANAFLEX) 4 MG tablet Take 4 mg by mouth every 8 hours as needed      lidocaine (XYLOCAINE) 5 % ointment Apply topically as needed. 1 Tube 0    diphenhydrAMINE (BENADRYL) 25 MG tablet Take 25 mg by mouth every 6 hours as needed for Itching       No current facility-administered medications for this visit.       Allergies   Allergen Reactions    Bee Venom Anaphylaxis    Dexamethasone Shortness Of Breath     Hives and hot flashes    Other reaction(s): Unknown    Prednisone      CAUSES HEADACHE, RASH, hard to breathe    Benzocaine Rash     Mouth numbing medicine- gums and lips swell  Other reaction(s): Unknown    Cyclopentasiloxane Itching and Swelling    Dimethicone Itching and Swelling    Dimethiconol Itching and Swelling    Food Hives     Oysters, clams, mussels, scallops--mollusks    Silicone      Swell up--lubricant    Sulfamethoxazole-Trimethoprim Hives     Bactrim- hives  Other reaction(s): Unknown    Tetanus Toxoids      Hives, arm swells up    Bactrim [Sulfamethoxazole-Trimethoprim] Hives    Cortisone      HIVES    Seasonal      Hayfever        Health Maintenance   Topic Date Due    Lipid screen  01/30/2021    Flu vaccine (1) 02/22/2022 (Originally 9/1/2020)    Breast cancer screen  11/14/2022    Pneumococcal 0-64 years Vaccine  Completed    Hepatitis C screen  Completed    HIV screen  Completed    Hepatitis A vaccine  Aged Out    Hepatitis B vaccine  Aged Out    Hib vaccine  Aged Out    Meningococcal (ACWY) vaccine  Aged Out    Varicella vaccine  Discontinued       Subjective:     Review of Systems   Constitutional: Negative. HENT: Negative. Eyes: Negative. Respiratory: Negative. Cardiovascular: Negative. Gastrointestinal: Negative. Genitourinary: Negative. Musculoskeletal: Negative. Skin: Negative. Allergic/Immunologic: Negative for environmental allergies, food allergies and immunocompromised state. Neurological: Negative. Psychiatric/Behavioral: Negative. Objective:     Physical Exam  Vitals signs and nursing note reviewed. Constitutional:       General: She is awake. She is not in acute distress. Appearance: Normal appearance. She is obese. She is not ill-appearing, toxic-appearing or diaphoretic. HENT:      Head: Normocephalic and atraumatic. Right Ear: External ear normal.      Left Ear: External ear normal.      Nose: Nose normal.   Eyes:      Extraocular Movements: Extraocular movements intact. Conjunctiva/sclera: Conjunctivae normal.      Pupils: Pupils are equal, round, and reactive to light. Neck:      Musculoskeletal: Normal range of motion and neck supple. Cardiovascular:      Rate and Rhythm: Normal rate and regular rhythm. Pulses: Normal pulses. Heart sounds: Normal heart sounds. Pulmonary:      Effort: Pulmonary effort is normal. No respiratory distress. Breath sounds: Normal breath sounds. No stridor. No wheezing, rhonchi or rales. Abdominal:      General: Abdomen is flat. Bowel sounds are normal.      Palpations: Abdomen is soft. Musculoskeletal: Normal range of motion. Skin:     General: Skin is warm and dry. Capillary Refill: Capillary refill takes less than 2 seconds. Neurological:      General: No focal deficit present. Mental Status: She is alert and oriented to person, place, and time. Mental status is at baseline.    Psychiatric: Attention and Perception: Attention normal.         Mood and Affect: Mood and affect normal.         Speech: Speech normal.         Behavior: Behavior normal.         Thought Content: Thought content normal.         Judgment: Judgment normal.       /76 (Site: Left Upper Arm, Position: Sitting, Cuff Size: Medium Adult)   Temp 96.9 °F (36.1 °C)   Ht 5' 2\" (1.575 m)   Wt 194 lb (88 kg)   LMP 01/09/2020   BMI 35.48 kg/m²     Assessment:       Diagnosis Orders   1. Mild intermittent asthma without complication     2. Anaphylaxis, initial encounter  AFL - Prashant Restrepo MD, Otolaryngology, [place]   3. Gastroesophageal reflux disease without esophagitis  omeprazole (PRILOSEC) 20 MG delayed release capsule   4. Hyperlipidemia, unspecified hyperlipidemia type  Lipid, Fasting    Comprehensive Metabolic Panel, Fasting   5. Hypothyroidism, unspecified type  TSH With Reflex Ft4   6. Leukocytosis, unspecified type  CBC Auto Differential   7. Colitis     8. Abnormal TSH     9. Vitamin D deficiency  Vitamin D 25 Hydroxy   10. Seasonal allergies     11. Lipid screening  Lipid, Fasting   12. Diabetes mellitus screening  Comprehensive Metabolic Panel, Fasting   13. Encounter for vitamin deficiency screening  Vitamin D 25 Hydroxy    Vitamin B12 & Folate             Plan:    Asthma without complicationpatient to use rescue inhaler as needed. Continue on medications to help with allergies. I am referring her to see ENT to discuss further skin prick testing to assess for specific allergens that trigger her asthma as well as her allergies. Anaphylaxispatient to use as needed EpiPen. Also strongly encourage avoidance of allergens. GERDpatient on PPI chronically. Will need to monitor for magnesium deficiency or other side effects. Hypothyroidismcontinue medication, get labs checked yearly. Hyperlipidemialast labs were done last year.   Orders are provided today for fasting labs to be done.    Jing Melton was following with hematology. No current concerns at this time. She will continue to follow-up with them and get a blood count with labs. Colitispatient is following closely with GI. Avoid triggers. Vitamin D deficiencyrecheck vitamin D level and replace as needed. Return in about 6 months (around 8/22/2021) for hyperlipid, asthma. Orders Placed This Encounter   Procedures    Lipid, Fasting     Standing Status:   Future     Standing Expiration Date:   2/22/2022    CBC Auto Differential     Standing Status:   Future     Standing Expiration Date:   2/22/2022    Comprehensive Metabolic Panel, Fasting     Standing Status:   Future     Standing Expiration Date:   2/22/2022    TSH With Reflex Ft4     Standing Status:   Future     Standing Expiration Date:   2/22/2022    Vitamin D 25 Hydroxy     Standing Status:   Future     Standing Expiration Date:   2/22/2022    Vitamin B12 & Folate     Standing Status:   Future     Standing Expiration Date:   2/22/2022    TOBIAS - Armando Miller MD, Otolaryngology, [place]     Referral Priority:   Routine     Referral Type:   Eval and Treat     Referral Reason:   Specialty Services Required     Referred to Provider:   Sirena Irizarry MD     Requested Specialty:   Otolaryngology     Number of Visits Requested:   1     Orders Placed This Encounter   Medications    omeprazole (PRILOSEC) 20 MG delayed release capsule     Sig: Take 1 capsule by mouth Daily     Dispense:  90 capsule     Refill:  1       Patient given educational materials - see patient instructions. Discussed use, benefit, and side effects of prescribed medications. All patientquestions answered. Pt voiced understanding. Reviewed health maintenance. Instructedto continue current medications, diet and exercise. Patient agreed with treatmentplan. Follow up as directed.      Electronically signed by Martha Rice MD on 2/27/2021 at 2:11 PM

## 2021-02-27 ASSESSMENT — ENCOUNTER SYMPTOMS
RESPIRATORY NEGATIVE: 1
GASTROINTESTINAL NEGATIVE: 1
EYES NEGATIVE: 1

## 2021-03-11 RX ORDER — ONDANSETRON 4 MG/1
TABLET, FILM COATED ORAL
Qty: 10 TABLET | Refills: 3 | Status: SHIPPED | OUTPATIENT
Start: 2021-03-11 | End: 2021-09-17 | Stop reason: SDUPTHER

## 2021-03-11 NOTE — TELEPHONE ENCOUNTER
Pharmacy requesting refill of Ondansetron.       Medication active on med list yes      Date of last fill: 6/4/2020  with 3 refills verified on 3/11/21  verified by PRITESH AYALA      Date of last appointment 12/15/20    Next Visit Date:  12/21/21

## 2021-03-22 RX ORDER — LORATADINE 10 MG/1
10 TABLET ORAL DAILY
Qty: 30 TABLET | Refills: 1 | Status: SHIPPED | OUTPATIENT
Start: 2021-03-22 | End: 2021-05-19

## 2021-03-22 RX ORDER — GLUCOSAMINE/CHONDR SU A SOD 750-600 MG
1 TABLET ORAL DAILY
Qty: 30 CAPSULE | Refills: 1 | Status: SHIPPED | OUTPATIENT
Start: 2021-03-22 | End: 2021-05-19

## 2021-03-22 NOTE — TELEPHONE ENCOUNTER
Received faxed medication refill request, prescription pended. Please advise, thank you!         Next Visit Date:  Future Appointments   Date Time Provider Skylar Shoemakeri   4/16/2021  8:30 AM Anthony Berkowitz MD GYN Oncology [facility]   8/23/2021  9:15 AM Twila Eid MD Ma PC [facility]   12/21/2021  9:00 AM [facility]   Topic Date Due    COVID-19 Vaccine (1) Never done    Lipid screen  01/30/2021    Flu vaccine (1) 02/22/2022 (Originally 9/1/2020)    Breast cancer screen  11/14/2022    Pneumococcal 0-64 years Vaccine  Completed    Hepatitis C screen  Completed    HIV screen  Completed    Hepatitis A vaccine  Aged Out    Hepatitis B vaccine  Aged Out    Hib vaccine  Aged Out    Meningococcal (ACWY) vaccine  Aged Out    Varicella vaccine  Discontinued       Hemoglobin A1C (%)   Date Value   02/12/2018 5.0             ( goal A1C is < 7)   No results found for: LABMICR  LDL Cholesterol (mg/dL)   Date Value   01/30/2020 79   06/19/2019 68       (goal LDL is <100)   AST (U/L)   Date Value   02/05/2020 15     ALT (U/L)   Date Value   02/05/2020 9     BUN (mg/dL)   Date Value   02/18/2020 4 (L)     BP Readings from Last 3 Encounters:   02/22/21 112/76   12/15/20 117/81   12/11/20 126/85          (goal 120/80)    All Future Testing planned in [facility]  Lab Frequency Next Occurrence   PAP SMEAR Once 08/14/2020   Lipid, Fasting Once 02/22/2021   CBC Auto Differential Once 02/22/2021   Comprehensive Metabolic Panel, Fasting Once 02/22/2021   TSH With Reflex Ft4 Once 02/22/2021   Vitamin D 25 Hydroxy Once 02/22/2021   Vitamin B12 & Folate Once 02/22/2021               Patient Active Problem List:     Asthma     Murmur     Seasonal allergies     Abnormal TSH     Hyperlipidemia     Vitamin D deficiency     Need for pneumococcal vaccination     Enlarged tonsils     Abnormal ultrasound of gallbladder     Colitis     BMI 35.0-35.9,adult     S/P laparoscopic cholecystectomy     Adenocarcinoma in situ (AIS) of uterine cervix     Mild mitral regurgitation     Mild left atrial enlargement     RALH BS, Cytologic Washings, STEVAN & Cystoscopy 2/17/20     At high risk for breast cancer     Hepatic flexure mass     Abnormal MRI     Liver hemangioma

## 2021-03-29 ENCOUNTER — HOSPITAL ENCOUNTER (OUTPATIENT)
Age: 41
Setting detail: SPECIMEN
Discharge: HOME | End: 2021-03-29
Payer: MEDICARE

## 2021-03-29 DIAGNOSIS — E55.9 VITAMIN D DEFICIENCY: ICD-10-CM

## 2021-03-29 DIAGNOSIS — Z13.21 ENCOUNTER FOR VITAMIN DEFICIENCY SCREENING: ICD-10-CM

## 2021-03-29 DIAGNOSIS — Z13.220 LIPID SCREENING: ICD-10-CM

## 2021-03-29 DIAGNOSIS — E03.9 HYPOTHYROIDISM, UNSPECIFIED TYPE: ICD-10-CM

## 2021-03-29 DIAGNOSIS — E78.5 HYPERLIPIDEMIA, UNSPECIFIED HYPERLIPIDEMIA TYPE: ICD-10-CM

## 2021-03-29 DIAGNOSIS — Z13.1 DIABETES MELLITUS SCREENING: ICD-10-CM

## 2021-03-29 DIAGNOSIS — D72.829 LEUKOCYTOSIS, UNSPECIFIED TYPE: ICD-10-CM

## 2021-03-29 LAB
ABSOLUTE EOS #: 0.12 K/UL (ref 0–0.44)
ABSOLUTE IMMATURE GRANULOCYTE: <0.03 K/UL (ref 0–0.3)
ABSOLUTE LYMPH #: 3.98 K/UL (ref 1.1–3.7)
ABSOLUTE MONO #: 0.84 K/UL (ref 0.1–1.2)
ALBUMIN SERPL-MCNC: 4.4 G/DL (ref 3.5–5.2)
ALBUMIN/GLOBULIN RATIO: 1.4 (ref 1–2.5)
ALP BLD-CCNC: 68 U/L (ref 35–104)
ALT SERPL-CCNC: 12 U/L (ref 5–33)
ANION GAP SERPL CALCULATED.3IONS-SCNC: 14 MMOL/L (ref 9–17)
AST SERPL-CCNC: 15 U/L
BASOPHILS # BLD: 0 % (ref 0–2)
BASOPHILS ABSOLUTE: 0.03 K/UL (ref 0–0.2)
BILIRUB SERPL-MCNC: 0.82 MG/DL (ref 0.3–1.2)
BUN BLDV-MCNC: 15 MG/DL (ref 6–20)
BUN/CREAT BLD: ABNORMAL (ref 9–20)
CALCIUM SERPL-MCNC: 9.5 MG/DL (ref 8.6–10.4)
CHLORIDE BLD-SCNC: 106 MMOL/L (ref 98–107)
CHOLESTEROL, FASTING: 133 MG/DL
CHOLESTEROL/HDL RATIO: 2.4
CO2: 24 MMOL/L (ref 20–31)
CREAT SERPL-MCNC: 0.54 MG/DL (ref 0.5–0.9)
DIFFERENTIAL TYPE: ABNORMAL
EOSINOPHILS RELATIVE PERCENT: 1 % (ref 1–4)
FOLATE: 6.5 NG/ML
GFR AFRICAN AMERICAN: >60 ML/MIN
GFR NON-AFRICAN AMERICAN: >60 ML/MIN
GFR SERPL CREATININE-BSD FRML MDRD: ABNORMAL ML/MIN/{1.73_M2}
GFR SERPL CREATININE-BSD FRML MDRD: ABNORMAL ML/MIN/{1.73_M2}
GLUCOSE FASTING: 60 MG/DL (ref 70–99)
HCT VFR BLD CALC: 47.2 % (ref 36.3–47.1)
HDLC SERPL-MCNC: 55 MG/DL
HEMOGLOBIN: 14.7 G/DL (ref 11.9–15.1)
IMMATURE GRANULOCYTES: 0 %
LDL CHOLESTEROL: 59 MG/DL (ref 0–130)
LYMPHOCYTES # BLD: 36 % (ref 24–43)
MCH RBC QN AUTO: 29.6 PG (ref 25.2–33.5)
MCHC RBC AUTO-ENTMCNC: 31.1 G/DL (ref 28.4–34.8)
MCV RBC AUTO: 95 FL (ref 82.6–102.9)
MONOCYTES # BLD: 8 % (ref 3–12)
NRBC AUTOMATED: 0 PER 100 WBC
PDW BLD-RTO: 13.1 % (ref 11.8–14.4)
PLATELET # BLD: 334 K/UL (ref 138–453)
PLATELET ESTIMATE: ABNORMAL
PMV BLD AUTO: 9.5 FL (ref 8.1–13.5)
POTASSIUM SERPL-SCNC: 4.1 MMOL/L (ref 3.7–5.3)
RBC # BLD: 4.97 M/UL (ref 3.95–5.11)
RBC # BLD: ABNORMAL 10*6/UL
SEG NEUTROPHILS: 55 % (ref 36–65)
SEGMENTED NEUTROPHILS ABSOLUTE COUNT: 5.94 K/UL (ref 1.5–8.1)
SODIUM BLD-SCNC: 144 MMOL/L (ref 135–144)
TOTAL PROTEIN: 7.6 G/DL (ref 6.4–8.3)
TRIGLYCERIDE, FASTING: 97 MG/DL
TSH SERPL DL<=0.05 MIU/L-ACNC: 5.26 MIU/L (ref 0.3–5)
VITAMIN B-12: 647 PG/ML (ref 232–1245)
VITAMIN D 25-HYDROXY: 36 NG/ML (ref 30–100)
VLDLC SERPL CALC-MCNC: NORMAL MG/DL (ref 1–30)
WBC # BLD: 10.9 K/UL (ref 3.5–11.3)
WBC # BLD: ABNORMAL 10*3/UL

## 2021-03-30 LAB — THYROXINE, FREE: 1.16 NG/DL (ref 0.93–1.7)

## 2021-04-09 ENCOUNTER — TELEPHONE (OUTPATIENT)
Dept: GYNECOLOGIC ONCOLOGY | Age: 41
End: 2021-04-09

## 2021-04-09 NOTE — TELEPHONE ENCOUNTER
Writer GREENE for patient to call office to [facility]'S 4/16 appointment with Dr. Delfina Farmer. He is out of the office this day. Patient can RS with CAMI Carbone same day/different time or with  on a different day.

## 2021-04-15 ENCOUNTER — TELEPHONE (OUTPATIENT)
Dept: GYNECOLOGIC ONCOLOGY | Age: 41
End: 2021-04-15

## 2021-04-15 NOTE — TELEPHONE ENCOUNTER
Writer left generic message for patient to call office number to confirm appointment with CAMI Noel on Friday.

## 2021-04-16 ENCOUNTER — OFFICE VISIT (OUTPATIENT)
Dept: GYNECOLOGIC ONCOLOGY | Age: 41
End: 2021-04-16
Payer: MEDICARE

## 2021-04-16 VITALS
WEIGHT: 192 LBS | BODY MASS INDEX: 35.12 KG/M2 | OXYGEN SATURATION: 98 % | DIASTOLIC BLOOD PRESSURE: 74 MMHG | HEART RATE: 81 BPM | SYSTOLIC BLOOD PRESSURE: 103 MMHG

## 2021-04-16 DIAGNOSIS — Z98.890 POSTOPERATIVE STATE: ICD-10-CM

## 2021-04-16 DIAGNOSIS — D06.9 ADENOCARCINOMA IN SITU (AIS) OF UTERINE CERVIX: Primary | ICD-10-CM

## 2021-04-16 PROCEDURE — 1036F TOBACCO NON-USER: CPT | Performed by: PHYSICIAN ASSISTANT

## 2021-04-16 PROCEDURE — 99214 OFFICE O/P EST MOD 30 MIN: CPT | Performed by: PHYSICIAN ASSISTANT

## 2021-04-16 PROCEDURE — G8417 CALC BMI ABV UP PARAM F/U: HCPCS | Performed by: PHYSICIAN ASSISTANT

## 2021-04-16 PROCEDURE — G8427 DOCREV CUR MEDS BY ELIG CLIN: HCPCS | Performed by: PHYSICIAN ASSISTANT

## 2021-04-16 ASSESSMENT — ENCOUNTER SYMPTOMS
BACK PAIN: 1
ABDOMINAL PAIN: 1
NAUSEA: 1
CHEST TIGHTNESS: 1

## 2021-04-16 NOTE — PATIENT INSTRUCTIONS
Return to clinic in 4 months    Plan to repeat pap smear at next visit    Call or return to clinic sooner with any questions or concerns

## 2021-04-16 NOTE — PROGRESS NOTES
Review of Systems   Respiratory: Positive for chest tightness. Cardiovascular: Positive for chest pain. Gastrointestinal: Positive for abdominal pain and nausea. Endocrine: Positive for hot flashes. Musculoskeletal: Positive for back pain. All other systems reviewed and are negative.
performed and so she is generally sore in that region. She has having no abdominal or pelvic pain. She has had no vaginal bleeding. She has no abnormal discharge. She is currently sexually active with no postcoital bleeding. She continues to follow with high risk breast imaging given her family history positive for history of breast cancer in her maternal aunt and paternal aunt. Most recent mammogram in December 23, 2020 was normal BI-RADS Category 1.      ROS:  I have personally reviewed and agree with the review of systems done by my ancillary staff in the [facility] documentation. Physical Exam:    Vitals:    04/16/21 1121   BP: 103/74   Pulse: 81   SpO2: 98%   Weight: 192 lb (87.1 kg)       General: well- appearing, no acute distress, alert and oriented x 3    HEENT: Thyroid normal size. No cervical or supraclavicular lymphadenopathy. Lungs: Clear to auscultate bilaterally to the bases    No CVA tenderness present bilaterally. Heart: Auscultation reveals regular rate and rhythm. No murmurs or gallops appreciated. Abdomen: Obese, soft. Multiple laparoscopic incision scars present. There are no herniations identified throughout. There are no masses. No detectable organomegaly. No inguinal lymphadenopathy bilaterally. Extremities: No edema, calf tenderness or deformities bilaterally. Pelvic: The patient has normal female external genitalia including, vulva, urethra, vagina, perineum, Bartholin glands. Uterus, bilateral fallopian tubes, and cervix are surgically absent. Speculum examination using a medium size speculum reveals no blood in vaginal vault. There is white discharge present. The vaginal cuff well-intact with no signs of erythema, induration, separation, or granulation tissue. Bimanual examination: Bladder is non-tender, without mass. There are no palpable vaginal nodules and no other pelvic masses, tenderness or pathology noted.        Assessment/Plan:  Cancer

## 2021-05-04 ENCOUNTER — HOSPITAL ENCOUNTER (EMERGENCY)
Age: 41
Discharge: HOME | End: 2021-05-04
Attending: EMERGENCY MEDICINE
Payer: MEDICARE

## 2021-05-04 VITALS
BODY MASS INDEX: 35.12 KG/M2 | TEMPERATURE: 97.7 F | HEART RATE: 84 BPM | SYSTOLIC BLOOD PRESSURE: 122 MMHG | OXYGEN SATURATION: 98 % | WEIGHT: 192 LBS | DIASTOLIC BLOOD PRESSURE: 86 MMHG | RESPIRATION RATE: 18 BRPM

## 2021-05-04 DIAGNOSIS — Z98.890 POSTOPERATIVE NAUSEA AND VOMITING: Primary | ICD-10-CM

## 2021-05-04 DIAGNOSIS — R11.2 POSTOPERATIVE NAUSEA AND VOMITING: Primary | ICD-10-CM

## 2021-05-04 PROCEDURE — 96374 THER/PROPH/DIAG INJ IV PUSH: CPT

## 2021-05-04 PROCEDURE — 6360000002 HC RX W HCPCS: Performed by: STUDENT IN AN ORGANIZED HEALTH CARE EDUCATION/TRAINING PROGRAM

## 2021-05-04 PROCEDURE — 99284 EMERGENCY DEPT VISIT MOD MDM: CPT

## 2021-05-04 PROCEDURE — 96361 HYDRATE IV INFUSION ADD-ON: CPT

## 2021-05-04 PROCEDURE — 2580000003 HC RX 258: Performed by: STUDENT IN AN ORGANIZED HEALTH CARE EDUCATION/TRAINING PROGRAM

## 2021-05-04 RX ORDER — 0.9 % SODIUM CHLORIDE 0.9 %
1000 INTRAVENOUS SOLUTION INTRAVENOUS ONCE
Status: COMPLETED | OUTPATIENT
Start: 2021-05-04 | End: 2021-05-04

## 2021-05-04 RX ORDER — ONDANSETRON 4 MG/1
4 TABLET, ORALLY DISINTEGRATING ORAL EVERY 8 HOURS PRN
Qty: 10 TABLET | Refills: 0 | Status: SHIPPED | OUTPATIENT
Start: 2021-05-04 | End: 2021-09-17 | Stop reason: SDUPTHER

## 2021-05-04 RX ORDER — ONDANSETRON 2 MG/ML
4 INJECTION INTRAMUSCULAR; INTRAVENOUS ONCE
Status: COMPLETED | OUTPATIENT
Start: 2021-05-04 | End: 2021-05-04

## 2021-05-04 RX ADMIN — SODIUM CHLORIDE 1000 ML: 9 INJECTION, SOLUTION INTRAVENOUS at 11:40

## 2021-05-04 RX ADMIN — ONDANSETRON 4 MG: 2 INJECTION INTRAMUSCULAR; INTRAVENOUS at 11:40

## 2021-05-04 ASSESSMENT — ENCOUNTER SYMPTOMS
ABDOMINAL PAIN: 0
COUGH: 0
ANAL BLEEDING: 0
NAUSEA: 1
CONSTIPATION: 0
BLOOD IN STOOL: 0
SHORTNESS OF BREATH: 0
CHOKING: 0
VOMITING: 1
ABDOMINAL DISTENTION: 0
WHEEZING: 0

## 2021-05-04 NOTE — ED NOTES
Pt S/P T&A yesterday per Dr. Abi Mooney at [facility]. Pt voice is clear and not muffled. Pt states it hurts to speak and swallow but is able to do both.   Dr. Bruce in to [address] [place] [address], RN  05/04/21 0925

## 2021-05-04 NOTE — ED NOTES
Att x2 per this writer to obtain IV access unsuccessful. Pt has great vasculature. This writer unable to advance IV cath.      Bart Tony RN  05/04/21 9612

## 2021-05-04 NOTE — ED NOTES
Bed: 27  Expected date:   Expected time:   Means of arrival:   Comments:     Juani Nicole RN  05/04/21 1021

## 2021-05-04 NOTE — ED PROVIDER NOTES
[facility]  ED  Emergency Department Encounter  Emergency Medicine Resident     Pt Name: Matias Muñoz  MRN: [id number]  Birthdate 1980  Date of evaluation: 5/4/21  PCP:  Kleber Hurst MD    [address] [place] [address]       Chief Complaint   Patient presents with    Post-op Problem     T&A yesterday, now having emesis       HISTORY OFPRESENT ILLNESS  (Location/Symptom, Timing/Onset, Context/Setting, Quality, Duration, Modifying Factors,Severity.)      Matias Muñoz is a 48 yo female who presents with nausea, vomiting, concern for dehydration. Patient states that yesterday she had a tonsillectomy and adenoidectomy by Dr. Gregorio Sauceda at [facility] and that since then she has been having nausea and vomiting and states that sometimes the vomiting does have dark/black material in it. She states that she was told to come in for concern of dehydration as well as control of the nausea. Denies any recorded fevers, sweats, chest pain or difficulty breathing, abdominal pain, urinary symptoms, or any other complaints. PAST MEDICAL / SURGICAL / SOCIAL / FAMILY HISTORY      has a past medical history of Allergic rhinitis, Aortic stenosis, Arthritis, Asthma, Cancer of endocervical canal ([address] GERD (gastroesophageal reflux disease), History of echocardiogram, Hyperlipidemia, Movement disorder, Neuropathy, Numbness and tingling, Petit mal ([address] Prolonged emergence from general anesthesia, Psoriasis, Pulmonary stenosis, Ringing in ears, Sacroiliac joint dysfunction of both sides, Sacroiliitis ([address] Symptomatic cholelithiasis, and Wears glasses. has a past surgical history that includes LEEP (2008); [place] tooth extraction (01/08/2016); Colonoscopy (01/08/2018); pr egd transoral biopsy single/multiple (N/A, 01/08/2018); pr colonoscopy w/biopsy single/multiple (N/A, 01/08/2018); pr lap,cholecystectomy (N/A, 04/27/2018); Nerve Block (06/2019); Nerve Block (07/2019);  Colposcopy (12/20/2019); Hysterectomy, total abdominal (2020); and Hysterectomy (N/A, 2020).      Social History     Socioeconomic History    Marital status:      Spouse name: Not on file    Number of children: Not on file    Years of education: Not on file    Highest education level: Not on file   Occupational History    Not on file   Social Needs    Financial resource strain: Not on file    Food insecurity     Worry: Not on file     Inability: Not on file    Transportation needs     Medical: Not on file     Non-medical: Not on file   Tobacco Use    Smoking status: Former Smoker     Packs/day: 0.25     Types: Cigarettes     Start date: 1996     Quit date: 10/1/2006     Years since quittin.6    Smokeless tobacco: Never Used    Tobacco comment: half pack a month quit    Substance and Sexual Activity    Alcohol use: Yes     Frequency: 2-3 times a week     Drinks per session: 1 or 2     Binge frequency: Never     Comment: social     Drug use: Yes     Types: Marijuana     Comment: edible cannabis for pain    Sexual activity: Yes     Partners: Male   Lifestyle    Physical activity     Days per week: Not on file     Minutes per session: Not on file    Stress: Not on file   Relationships    Social connections     Talks on phone: Not on file     Gets together: Not on file     Attends Druze service: Not on file     Active member of club or organization: Not on file     Attends meetings of clubs or organizations: Not on file     Relationship status: Not on file    Intimate partner violence     Fear of current or ex partner: Not on file     Emotionally abused: Not on file     Physically abused: Not on file     Forced sexual activity: Not on file   Other Topics Concern    Not on file   Social History Narrative    Not on file       Family History   Problem Relation Age of Onset    Diabetes Father    Samira Italia Other Mother         blood disorder    Other Maternal Aunt         blood disorder    Cancer Maternal Aunt         breast    Breast Cancer Maternal Aunt     Other Paternal Aunt         chiari malformation type 2     Cancer Paternal Aunt         cervical cancer    Cancer Maternal Grandmother         uterine cancer     Cancer Maternal Grandfather     High Blood Pressure Paternal Grandfather     Heart Disease Paternal Grandfather     Cancer Paternal Grandfather     Other Maternal Aunt         brain aneurysm        Allergies:  Bee venom, Dexamethasone, Prednisone, Benzocaine, Cyclopentasiloxane, Dimethicone, Dimethiconol, Food, Silicone, Sulfamethoxazole-trimethoprim, Tetanus toxoids, Bactrim [sulfamethoxazole-trimethoprim], Cortisone, and Seasonal    Home Medications:  Prior to Admission medications    Medication Sig Start Date End Date Taking?  Authorizing Provider   ondansetron (ZOFRAN ODT) 4 MG disintegrating tablet Take 1 tablet by mouth every 8 hours as needed for Nausea 5/4/21  Yes Ashley Macedo DO   Cholecalciferol (RA VITAMIN D-3) 50 MCG (2000 UT) CAPS Take 1 capsule by mouth daily 3/22/21   Marisela Gorman MD   loratadine (CLARITIN) 10 MG tablet Take 1 tablet by mouth daily 3/22/21   Marisela Gorman MD   ondansetron (ZOFRAN) 4 MG tablet take 1 tablet by mouth every 8 hours if needed for NAUSEA OR VOMITING 3/11/21   Rashid Barbosa MD   omeprazole (PRILOSEC) 20 MG delayed release capsule Take 1 capsule by mouth Daily 2/22/21   Marisela Gorman MD   atorvastatin (LIPITOR) 20 MG tablet Take one tab po daily 1/4/21   Ila Sánchez PA-C   AIMOVIG 140 MG/ML SOAJ inject 1 milliliter ( 140 milligrams ) subcutaneously Every Month in THE ABDOMEN,THIGH,OR OUTER AREA OF UPPER ARM 12/28/20   Nabil Villalobos MD   benzoyl peroxide 5 % external liquid Wash face, chest and back 1-2 times daily 8/21/20   Vincent Lyon MD   clindamycin (CLEOCIN T) 1 % lotion Apply to face, chest and back daily 8/21/20   Vincent Lyon MD   albuterol sulfate  (90 Base) MCG/ACT inhaler inhale 2 puffs by mouth Exam  Vitals signs and nursing note reviewed. Constitutional:       General: She is not in acute distress. Appearance: Normal appearance. She is not ill-appearing, toxic-appearing or diaphoretic. HENT:      Mouth/Throat:      Mouth: Mucous membranes are dry. Comments: Postsurgical changes in the posterior pharynx consistent with recent procedure. Patient is maintaining her secretions. No stridor or respiratory distress. Cardiovascular:      Rate and Rhythm: Normal rate and regular rhythm. Heart sounds: No murmur. No gallop. Pulmonary:      Effort: Pulmonary effort is normal.      Breath sounds: Normal breath sounds. Abdominal:      General: There is no distension. Palpations: Abdomen is soft. Tenderness: There is no abdominal tenderness. There is no guarding. Musculoskeletal:      Right lower leg: No edema. Left lower leg: No edema. Skin:     General: Skin is warm and dry. Neurological:      Mental Status: She is alert. DIFFERENTIAL  DIAGNOSIS     PLAN (LABS / IMAGING / EKG):  Orders Placed This Encounter   Procedures    Inpatient consult to Otolaryngology       MEDICATIONS ORDERED:  Orders Placed This Encounter   Medications    0.9 % sodium chloride bolus    ondansetron (ZOFRAN) injection 4 mg    ondansetron (ZOFRAN ODT) 4 MG disintegrating tablet     Sig: Take 1 tablet by mouth every 8 hours as needed for Nausea     Dispense:  10 tablet     Refill:  0       DDX: Postop nausea vomiting    Initial MDM/Plan/ED course: 47 y.o. female who presents with nausea vomiting since her tonsillectomy and adenoidectomy yesterday at [facility]. On exam vitals normal patient is in no acute distress. Physical exam shows normal postoperative changes in the posterior pharynx without any stridor, respiratory distress. Patient is maintaining the secretions well without any drooling. Mucous membranes are dry with concern for dehydration. Rest of physical exam unremarkable. Patient treated with IV Zofran and IV fluids. I did speak with her otolaryngologist who agrees with plan for symptomatic control and IV hydration and discharged home. Patient did get better with Zofran and was able to receive IV fluids. Patient discharged with Zofran ODT for home and follow-up with ENT. DIAGNOSTIC RESULTS / EMERGENCY DEPARTMENT COURSE / MDM     LABS:  Labs Reviewed - No data to display      RADIOLOGY:  No results found. EKG      All EKG's are interpreted by the [facility] Physician who either signs or Co-signs this chart in the absence of a cardiologist.      PROCEDURES:  None    CONSULTS:  IP CONSULT TO OTOLARYNGOLOGY    CRITICAL CARE:  Please see attending note    FINAL IMPRESSION      1. Postoperative nausea and vomiting          DISPOSITION / PLAN     DISPOSITION Decision To Discharge 05/04/2021 12:22:12 PM      PATIENT REFERRED TO:  Celeste Kilgore MD  [address] [facility] [address] [facility] [address]  [phone]    Schedule an appointment as soon as possible for a visit   Follow-up as scheduled.       DISCHARGE MEDICATIONS:  Discharge Medication List as of 5/4/2021 12:22 PM      START taking these medications    Details   ondansetron (ZOFRAN ODT) 4 MG disintegrating tablet Take 1 tablet by mouth every 8 hours as needed for Nausea, Disp-10 tablet, R-0Normal             Pura Huggins DO  Emergency Medicine Resident    (Please note that portions of this note were completed with a voice recognition program.Efforts were made to edit the dictations but occasionally words are mis-transcribed.)        Justo Donnelly DO  Resident  05/04/21 [id number]

## 2021-05-04 NOTE — ED PROVIDER NOTES
[facility] ED     Emergency Department     Faculty Attestation        I performed a history and physical examination of the patient and discussed management with the resident. I reviewed the residents note and agree with the documented findings and plan of care. Any areas of disagreement are noted on the chart. I was personally present for the key portions of any procedures. I have documented in the chart those procedures where I was not present during the key portions. I have reviewed the emergency nurses triage note. I agree with the chief complaint, past medical history, past surgical history, allergies, medications, social and family history as documented unless otherwise noted below. For mid-level providers such as nurse practitioners as well as physicians assistants:    I have personally seen and evaluated the patient. I find the patient's history and physical exam are consistent with NP/PA documentation. I agree with the care provided, treatment rendered, disposition, & follow-up plan. Additional findings are as noted. Vital Signs: /86   Pulse 84   Temp 97.7 °F (36.5 °C) (Skin)   Resp 18   Wt 192 lb (87.1 kg)   LMP 01/09/2020   SpO2 98%   BMI 35.12 kg/m²   PCP:  Anjelica Gonzales MD    Pertinent Comments:     PAtient with recent T+A. MI Dr. DOSS she had some vomiting with some specks of blood. There is no active bleeding now. She has normal voice handling secretions.   Otherwise hemodynamically stable      Critical Care  None          Samantha Winters MD  [facility]  Attending Emergency Medicine Physician              Mary Grace Remy MD  05/04/21 [id number]

## 2021-05-19 RX ORDER — LORATADINE 10 MG/1
TABLET ORAL
Qty: 30 TABLET | Refills: 1 | Status: SHIPPED | OUTPATIENT
Start: 2021-05-19 | End: 2021-07-18

## 2021-05-19 RX ORDER — ALBUTEROL SULFATE 90 UG/1
AEROSOL, METERED RESPIRATORY (INHALATION)
Qty: 8.5 G | Refills: 0 | Status: SHIPPED | OUTPATIENT
Start: 2021-05-19 | End: 2021-12-07 | Stop reason: SDUPTHER

## 2021-05-19 RX ORDER — GLUCOSAMINE/CHONDR SU A SOD 750-600 MG
TABLET ORAL
Qty: 30 CAPSULE | Refills: 1 | Status: SHIPPED | OUTPATIENT
Start: 2021-05-19 | End: 2021-07-18

## 2021-06-01 DIAGNOSIS — Z91.89 AT HIGH RISK FOR BREAST CANCER: Primary | ICD-10-CM

## 2021-06-18 ENCOUNTER — HOSPITAL ENCOUNTER (OUTPATIENT)
Dept: MRI IMAGING | Age: 41
Discharge: HOME | End: 2021-06-20
Payer: MEDICARE

## 2021-06-18 DIAGNOSIS — Z91.89 AT HIGH RISK FOR BREAST CANCER: ICD-10-CM

## 2021-06-18 PROCEDURE — 77049 MRI BREAST C-+ W/CAD BI: CPT

## 2021-06-18 PROCEDURE — 6360000004 HC RX CONTRAST MEDICATION: Performed by: NURSE PRACTITIONER

## 2021-06-18 PROCEDURE — 2580000003 HC RX 258: Performed by: NURSE PRACTITIONER

## 2021-06-18 PROCEDURE — A9579 GAD-BASE MR CONTRAST NOS,1ML: HCPCS | Performed by: NURSE PRACTITIONER

## 2021-06-18 RX ORDER — 0.9 % SODIUM CHLORIDE 0.9 %
100 INTRAVENOUS SOLUTION INTRAVENOUS ONCE
Status: COMPLETED | OUTPATIENT
Start: 2021-06-18 | End: 2021-06-18

## 2021-06-18 RX ORDER — SODIUM CHLORIDE 0.9 % (FLUSH) 0.9 %
10 SYRINGE (ML) INJECTION ONCE
Status: COMPLETED | OUTPATIENT
Start: 2021-06-18 | End: 2021-06-18

## 2021-06-18 RX ADMIN — SODIUM CHLORIDE, PRESERVATIVE FREE 10 ML: 5 INJECTION INTRAVENOUS at 07:23

## 2021-06-18 RX ADMIN — SODIUM CHLORIDE 60 ML: 9 INJECTION, SOLUTION INTRAVENOUS at 07:22

## 2021-06-18 RX ADMIN — GADOTERIDOL 17 ML: 279.3 INJECTION, SOLUTION INTRAVENOUS at 07:21

## 2021-07-01 RX ORDER — ERENUMAB-AOOE 140 MG/ML
INJECTION, SOLUTION SUBCUTANEOUS
Qty: 1 ML | Refills: 4 | Status: SHIPPED | OUTPATIENT
Start: 2021-07-01 | End: 2021-12-01

## 2021-07-01 NOTE — TELEPHONE ENCOUNTER
Oneil Nesbitt this is a former patient of Dr. Melly Bryson that will be following with you on 12//7/21. Patient called the office requesting a new Aimovig Rx.         Medication active on med list: yes      Date of last fill: 12/28/20 for #1 and 5 refills  verified on 7/1/2021    verified by Marycruz Parker LPN      Date of last appointment: 12/15/2020    Next Visit Date:  12/7/2021

## 2021-07-12 NOTE — TELEPHONE ENCOUNTER
From: Ellen Perez  Sent: 12/5/2017 11:44 AM EST  Subject: Medication Renewal Request    Ellen Perez would like a refill of the following medications:  amitriptyline (ELAVIL) 75 MG tablet [EMANUEL Orellana PA-C]    Preferred pharmacy: [facility] [address] [place], [place] [phone] [facility] [phone]    Comment:  have enough to last till Dec 10th. Please send refill to the pharmacy at [facility] at [address] [facility] [address] [facility].  Thank [address]
mental health

## 2021-07-18 RX ORDER — ACETAMINOPHEN 160 MG
TABLET,DISINTEGRATING ORAL
Qty: 30 CAPSULE | Refills: 1 | Status: SHIPPED | OUTPATIENT
Start: 2021-07-18 | End: 2021-12-07 | Stop reason: SDUPTHER

## 2021-07-18 RX ORDER — LORATADINE 10 MG/1
TABLET ORAL
Qty: 30 TABLET | Refills: 1 | Status: SHIPPED | OUTPATIENT
Start: 2021-07-18 | End: 2021-12-07 | Stop reason: SDUPTHER

## 2021-07-19 RX ORDER — ATORVASTATIN CALCIUM 20 MG/1
TABLET, FILM COATED ORAL
Qty: 90 TABLET | Refills: 1 | Status: SHIPPED | OUTPATIENT
Start: 2021-07-19 | End: 2022-01-20

## 2021-07-19 NOTE — TELEPHONE ENCOUNTER
Received faxed medication refill request, prescription pended. Please advise, thank you!         Next Visit Date:  Future Appointments   Date Time Provider Skylar Shoemakeri   8/20/2021  9:15 AM Tanner Norton GYN Oncology [facility]   8/23/2021  9:15 AM MD Jojo Johnson  [facility]   12/7/2021  8:00 AM Mouna Luna, APRN - CNP Neuro [facility] Maintenance   Topic Date Due    COVID-19 Vaccine (1) Never done    Flu vaccine (1) 09/01/2021    Lipid screen  03/29/2022    Breast cancer screen  11/14/2022    Pneumococcal 0-64 years Vaccine (2 of 2 - PPSV23) 11/14/2047    Hepatitis C screen  Completed    HIV screen  Completed    Hepatitis A vaccine  Aged Out    Hepatitis B vaccine  Aged Out    Hib vaccine  Aged Out    Meningococcal (ACWY) vaccine  Aged Out    Varicella vaccine  Discontinued       Hemoglobin A1C (%)   Date Value   02/12/2018 5.0             ( goal A1C is < 7)   No results found for: LABMICR  LDL Cholesterol (mg/dL)   Date Value   03/29/2021 59   01/30/2020 79       (goal LDL is <100)   AST (U/L)   Date Value   03/29/2021 15     ALT (U/L)   Date Value   03/29/2021 12     BUN (mg/dL)   Date Value   03/29/2021 15     BP Readings from Last 3 Encounters:   05/04/21 122/86   04/16/21 103/74   02/22/21 112/76          (goal 120/80)    All Future Testing planned in [facility]  Lab Frequency Next Occurrence   PAP SMEAR Once 08/14/2020               Patient Active Problem List:     Asthma     Murmur     Seasonal allergies     Abnormal TSH     Hyperlipidemia     Vitamin D deficiency     Need for pneumococcal vaccination     Enlarged tonsils     Abnormal ultrasound of gallbladder     Colitis     BMI 35.0-35.9,adult     S/P laparoscopic cholecystectomy     Adenocarcinoma in situ (AIS) of uterine cervix     Mild mitral regurgitation     Mild left atrial enlargement     RALH BS, Cytologic Washings, STEVAN & Cystoscopy 2/17/20     At high risk for breast cancer     Hepatic flexure mass Abnormal MRI     Liver hemangioma

## 2021-08-15 DIAGNOSIS — K21.9 GASTROESOPHAGEAL REFLUX DISEASE WITHOUT ESOPHAGITIS: ICD-10-CM

## 2021-08-15 RX ORDER — OMEPRAZOLE 20 MG/1
20 CAPSULE, DELAYED RELEASE ORAL DAILY
Qty: 90 CAPSULE | Refills: 1 | Status: SHIPPED | OUTPATIENT
Start: 2021-08-15 | End: 2022-02-24 | Stop reason: SDUPTHER

## 2021-08-20 ENCOUNTER — OFFICE VISIT (OUTPATIENT)
Dept: GYNECOLOGIC ONCOLOGY | Age: 41
End: 2021-08-20
Payer: MEDICARE

## 2021-08-20 ENCOUNTER — HOSPITAL ENCOUNTER (OUTPATIENT)
Age: 41
Setting detail: SPECIMEN
Discharge: HOME | End: 2021-08-20
Payer: MEDICARE

## 2021-08-20 VITALS
DIASTOLIC BLOOD PRESSURE: 82 MMHG | SYSTOLIC BLOOD PRESSURE: 115 MMHG | OXYGEN SATURATION: 98 % | TEMPERATURE: 98.3 F | HEART RATE: 83 BPM | BODY MASS INDEX: 35.3 KG/M2 | HEIGHT: 62 IN | WEIGHT: 191.8 LBS

## 2021-08-20 DIAGNOSIS — B97.7 HIGH RISK HPV INFECTION: ICD-10-CM

## 2021-08-20 DIAGNOSIS — D06.9 ADENOCARCINOMA IN SITU (AIS) OF UTERINE CERVIX: Primary | ICD-10-CM

## 2021-08-20 PROCEDURE — 1036F TOBACCO NON-USER: CPT | Performed by: PHYSICIAN ASSISTANT

## 2021-08-20 PROCEDURE — G8417 CALC BMI ABV UP PARAM F/U: HCPCS | Performed by: PHYSICIAN ASSISTANT

## 2021-08-20 PROCEDURE — G8427 DOCREV CUR MEDS BY ELIG CLIN: HCPCS | Performed by: PHYSICIAN ASSISTANT

## 2021-08-20 PROCEDURE — 99214 OFFICE O/P EST MOD 30 MIN: CPT | Performed by: PHYSICIAN ASSISTANT

## 2021-08-20 ASSESSMENT — ENCOUNTER SYMPTOMS
VOMITING: 1
BACK PAIN: 1
NAUSEA: 1

## 2021-08-20 NOTE — PROGRESS NOTES
[address], [facility], [address] [facility] [place] present for her adenocarcinoma in situ of the cervix surveillance visit. CC/HPI: She is a  AB3 female has a history of adenocarcinoma in situ of the endocervix and has undergone a robotic laparoscopic abdominal hysterectomy, bilateral salpingectomies, peritoneal washings for cytology, cystoscopy and biopsy of vesicouterine peritoneum on 2020. The patient initially was following with her OB/GYN for her annual wellness care. A Pap smear was obtained on 2019 which revealed ASCUS, HPV 16+. She later returned for colposcopy on 2019 and endocervical curettings revealed atypical endocervical glandular epithelium consistent with adenocarcinoma in situ. Two cervical biopsies were taken which also revealed adenocarcinoma in situ. She was then referred to [facility] gynecologic oncology for further evaluation and treatment. Various forms of treatment options were discussed regarding her diagnosis, and the patient had decided that she would like to proceed with a hysterectomy. Tumor markers were obtained preoperatively which revealed a normal limit of CA-125 and CEA on 2020    Final pathology revealed cervix with focal residual adenocarcinoma in situ. No evidence of invasive carcinoma, benign surgical margins. The uterus was also benign and there is no myometrial or serosal lesions identified. Bilateral fallopian tubes were also benign. A bladder flap peritoneal biopsy noted focal endometriosis. Pelvic washings were negative for malignancy. Bilateral ovaries appeared normal and were therefore left in situ. She has followed on surveillance and done well. Recommendations by ASCCP guidelines for management include HPV testing annually for 3 years then test at 3 year intervals for at least 25 years.     Her most recent Pap smear on 2020 was negative for intraepithelial lesion or malignancy. Today, she is doing well. She has no concerns from GYN stand point. She denies pelvic pain. Denies vaginal bleeding, discharge or odor. No post coital bleeding. No new lumps or bumps. ROS:  I have personally reviewed and agree with the review of systems done by my ancillary staff in the [facility] documentation. Physical Exam:    Vitals:    08/20/21 0912   BP: 115/82   Pulse: 83   Temp: 98.3 °F (36.8 °C)   TempSrc: Temporal   SpO2: 98%   Weight: 191 lb 12.8 oz (87 kg)   Height: 5' 2\" (1.575 m)       General: well- appearing, no acute distress, alert and oriented x 3    HEENT: Thyroid normal size. No cervical or supraclavicular lymphadenopathy. Lungs: Clear to auscultate bilaterally to the bases    No CVA tenderness present bilaterally. Heart: Auscultation reveals regular rate and rhythm. No murmurs or gallops appreciated. Abdomen: Obese, soft. Multiple laparoscopic incision scars present. There are no herniations identified throughout. There are no masses. No detectable organomegaly. No inguinal lymphadenopathy bilaterally. Extremities: No edema, calf tenderness or deformities bilaterally. Pelvic: The patient has normal female external genitalia including, vulva, urethra, vagina, perineum, Bartholin glands. Uterus, bilateral fallopian tubes, and cervix are surgically absent. Speculum examination using a medium size speculum reveals no blood in vaginal vault. There is white discharge present. The vaginal cuff well-intact with no signs of erythema, induration, separation, or granulation tissue. Screening pap smear obtained. Pt tolerated without incident. Bimanual examination: Bladder is non-tender, without mass. There are no palpable vaginal nodules and no other pelvic masses, tenderness or pathology noted.        Assessment/Plan:  Cancer Staging  Adenocarcinoma in situ (AIS) of uterine cervix  Staging form: Cervix Uteri, AJCC 8th Edition  - Clinical stage from 2/20/2020: ycT0, cM0 - Signed by Wyatt Godinez MD on 2/20/2020  Staging comments: This was a small area of adenocarcinoma in situ. Cone biopsy revealed no residual disease. All margins clear. Pap smear was positive for P 16 high risk HPV  - Pathologic stage from 2/20/2020: Stage Unknown (ypT0, pNX, cM0) - Signed by Wyatt Godinez MD on 2/20/2020  Staging comments: Patient had an adenocarcinoma in situ. Subsequent cold knife cone biopsy revealed no residual disease. All margins negative. Impression: Hx adenocarcinoma in situ of cervix s/p YUMIKO/bilateral salpingectomy and staging biopsies February 2020. Focal residual adenocarcinoma in situ in final specimen, margins negative. No evidence of invasive disease. Most recent pap smear August 2020 negative. Repeat and HPV taken today. Her Surveillance plan is as follows:   1. Return to clinic in 6 months for follow up exam    2. Repeat HPV 1 year or may be sooner based on results taken today    3. She is to call or return to clinic sooner with any questions or concerns    I spent 30 minutes providing care to the patient and >50% of the time was spent counseling and coordinating care, discussing the patient's current situation, reviewing her options, counseling and education her and answering her questions. The patient's pertinent images, labs, and previous records and procedures were reviewed.     Electronically signed by Del Souza PA-C on 8/20/2021 at 9:25 AM EDT

## 2021-08-20 NOTE — PATIENT INSTRUCTIONS
Will notify of results once available    Return to clinic in 6 months    Call or return to clinic sooner with questions or concerns

## 2021-08-20 NOTE — PROGRESS NOTES
Review of Systems   HENT:   Sore throat: has tonsils removed in May 2021. Cardiovascular: Positive for chest pain (at times sees cardiology) and leg swelling. Gastrointestinal: Positive for nausea and vomiting. At times   Musculoskeletal: Positive for back pain (not new). Neurological: Positive for dizziness, headaches, light-headedness and numbness.         At times not new ,sees neurology

## 2021-08-24 LAB
HPV SAMPLE: NORMAL
HPV, GENOTYPE 16: NOT DETECTED
HPV, GENOTYPE 18: NOT DETECTED
HPV, HIGH RISK OTHER: NOT DETECTED
HPV, INTERPRETATION: NORMAL
SPECIMEN DESCRIPTION: NORMAL

## 2021-08-26 LAB — CYTOLOGY REPORT: NORMAL

## 2021-09-14 RX ORDER — ALBUTEROL SULFATE 90 UG/1
AEROSOL, METERED RESPIRATORY (INHALATION)
Qty: 8.5 G | Refills: 0 | Status: SHIPPED | OUTPATIENT
Start: 2021-09-14 | End: 2021-09-23 | Stop reason: SDUPTHER

## 2021-09-14 RX ORDER — ACETAMINOPHEN 160 MG
TABLET,DISINTEGRATING ORAL
Qty: 30 CAPSULE | Refills: 1 | Status: SHIPPED | OUTPATIENT
Start: 2021-09-14 | End: 2021-11-07

## 2021-09-14 RX ORDER — LORATADINE 10 MG/1
TABLET ORAL
Qty: 30 TABLET | Refills: 1 | Status: SHIPPED | OUTPATIENT
Start: 2021-09-14 | End: 2021-11-07

## 2021-09-17 NOTE — TELEPHONE ENCOUNTER
Pharmacy requesting refill of Zofran.       Medication active on med list yes      Date of last fill: 3/11/21  verified on 9/17/2021   verified by [facility] LPN      Date of last appointment 12/15/20    Next Visit Date:  12/7/2021

## 2021-09-20 RX ORDER — ONDANSETRON 4 MG/1
TABLET, FILM COATED ORAL
Qty: 10 TABLET | Refills: 3 | Status: SHIPPED | OUTPATIENT
Start: 2021-09-20 | End: 2022-05-18

## 2021-09-23 ENCOUNTER — TELEMEDICINE (OUTPATIENT)
Dept: FAMILY MEDICINE CLINIC | Age: 41
End: 2021-09-23
Payer: MEDICARE

## 2021-09-23 DIAGNOSIS — J45.31 MILD PERSISTENT ASTHMA WITH ACUTE EXACERBATION: ICD-10-CM

## 2021-09-23 DIAGNOSIS — D06.9 ADENOCARCINOMA IN SITU (AIS) OF UTERINE CERVIX: ICD-10-CM

## 2021-09-23 DIAGNOSIS — E03.9 HYPOTHYROIDISM, UNSPECIFIED TYPE: ICD-10-CM

## 2021-09-23 DIAGNOSIS — E78.5 HYPERLIPIDEMIA, UNSPECIFIED HYPERLIPIDEMIA TYPE: ICD-10-CM

## 2021-09-23 DIAGNOSIS — D72.829 LEUKOCYTOSIS, UNSPECIFIED TYPE: ICD-10-CM

## 2021-09-23 DIAGNOSIS — R01.1 MURMUR: Chronic | ICD-10-CM

## 2021-09-23 DIAGNOSIS — E55.9 VITAMIN D DEFICIENCY: ICD-10-CM

## 2021-09-23 DIAGNOSIS — E78.2 MIXED HYPERLIPIDEMIA: ICD-10-CM

## 2021-09-23 DIAGNOSIS — J30.81 ALLERGIC RHINITIS DUE TO ANIMAL HAIR AND DANDER: Primary | ICD-10-CM

## 2021-09-23 PROBLEM — J35.1 ENLARGED TONSILS: Status: RESOLVED | Noted: 2017-12-08 | Resolved: 2021-09-23

## 2021-09-23 PROCEDURE — 99214 OFFICE O/P EST MOD 30 MIN: CPT | Performed by: FAMILY MEDICINE

## 2021-09-23 PROCEDURE — G8427 DOCREV CUR MEDS BY ELIG CLIN: HCPCS | Performed by: FAMILY MEDICINE

## 2021-09-23 RX ORDER — ALBUTEROL SULFATE 90 UG/1
2 AEROSOL, METERED RESPIRATORY (INHALATION) EVERY 6 HOURS PRN
Qty: 18 G | Refills: 3 | Status: SHIPPED | OUTPATIENT
Start: 2021-09-23

## 2021-09-23 SDOH — ECONOMIC STABILITY: TRANSPORTATION INSECURITY
IN THE PAST 12 MONTHS, HAS LACK OF TRANSPORTATION KEPT YOU FROM MEETINGS, WORK, OR FROM GETTING THINGS NEEDED FOR DAILY LIVING?: NO

## 2021-09-23 SDOH — ECONOMIC STABILITY: TRANSPORTATION INSECURITY
IN THE PAST 12 MONTHS, HAS THE LACK OF TRANSPORTATION KEPT YOU FROM MEDICAL APPOINTMENTS OR FROM GETTING MEDICATIONS?: NO

## 2021-09-23 SDOH — ECONOMIC STABILITY: FOOD INSECURITY: WITHIN THE PAST 12 MONTHS, YOU WORRIED THAT YOUR FOOD WOULD RUN OUT BEFORE YOU GOT MONEY TO BUY MORE.: NEVER TRUE

## 2021-09-23 SDOH — ECONOMIC STABILITY: FOOD INSECURITY: WITHIN THE PAST 12 MONTHS, THE FOOD YOU BOUGHT JUST DIDN'T LAST AND YOU DIDN'T HAVE MONEY TO GET MORE.: NEVER TRUE

## 2021-09-23 ASSESSMENT — ENCOUNTER SYMPTOMS
EYES NEGATIVE: 1
APNEA: 0
WHEEZING: 1
VOICE CHANGE: 0
SHORTNESS OF BREATH: 0
GASTROINTESTINAL NEGATIVE: 1
RHINORRHEA: 1
COUGH: 1
SORE THROAT: 0
SINUS PAIN: 0
FACIAL SWELLING: 0
TROUBLE SWALLOWING: 0
SINUS PRESSURE: 0
STRIDOR: 0
CHOKING: 0
CHEST TIGHTNESS: 0

## 2021-09-23 ASSESSMENT — SOCIAL DETERMINANTS OF HEALTH (SDOH): HOW HARD IS IT FOR YOU TO PAY FOR THE VERY BASICS LIKE FOOD, HOUSING, MEDICAL CARE, AND HEATING?: NOT VERY HARD

## 2021-09-23 NOTE — PROGRESS NOTES
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 The patient had a tonsillectomy in May and ended up back in the ER after vomiting up blood. She struggled with nausea as well as pain control after the surgery. Currently she is still having some throat sensations of tightness but she is able to eat without choking. She is following closely with ENT. They also did some basic allergy testing which showed that she is allergic to box would and cats. She is taking her allergy medicines consistently. The patient is consistently seeing gyn oncology for history of cervical cancer. She is getting Paps yearly. So far the Paps have been normal.  She does have an appointment with them scheduled for 6 months out. The patient has been seeing neurology for her migraines and has an appointment with neurology in December. The patient does state with allergy season her symptoms tend to get worse. She is having longer lasting migraines than normal.  She does take Aimovig for prevention and has Imitrex for breakthrough. She does not like the side effects of Imitrex. It does help get rid of the headache though. The patient's asthma has recently been acting up. She is having to use rescue inhaler more frequently and recently ran out of her rescue inhaler. She is awaiting approval from her insurance to get a refill on her rescue inhaler. Review of Systems   Constitutional: Negative.     HENT: Positive for congestion, nosebleeds, postnasal drip and rhinorrhea. Negative for dental problem, drooling, ear discharge, ear pain, facial swelling, hearing loss, mouth sores, sinus pressure, sinus pain, sneezing, sore throat, tinnitus, trouble swallowing and voice change. Eyes: Negative. Respiratory: Positive for cough and wheezing. Negative for apnea, choking, chest tightness, shortness of breath and stridor. Cardiovascular: Negative. Gastrointestinal: Negative. Genitourinary: Negative. Musculoskeletal: Negative. Skin: Negative. Patient has skin lesions which she is following up with dermatology for. Allergic/Immunologic: Negative for environmental allergies, food allergies and immunocompromised state. Neurological: Positive for headaches. Psychiatric/Behavioral: Negative. Prior to Visit Medications    Medication Sig Taking?  Authorizing Provider   albuterol sulfate  (90 Base) MCG/ACT inhaler Inhale 2 puffs into the lungs every 6 hours as needed for Wheezing Yes Rahul Valdivia MD   ondansetron (ZOFRAN) 4 MG tablet take 1 tablet by mouth every 8 hours if needed for NAUSEA OR VOMITING Yes FRANDY Whitt CNP   Cholecalciferol (VITAMIN D3) 50 MCG (2000 UT) CAPS take 1 capsule by mouth once daily Yes Rahul Valdivia MD   loratadine (CLARITIN) 10 MG tablet take 1 tablet by mouth once daily Yes Rahul Valdivia MD   omeprazole (PRILOSEC) 20 MG delayed release capsule take 1 capsule by mouth daily Yes Rahul Valdivia MD   atorvastatin (LIPITOR) 20 MG tablet Take one tab po daily Yes Timoteo Ordoñez MD   Erenumab-aooe (AIMOVIG) 140 MG/ML SOAJ inject 1 milliliter ( 140 milligrams ) subcutaneously Every Month in THE ABDOMEN,THIGH,OR OUTER AREA OF UPPER ARM Yes FRANDY Whitt CNP   benzoyl peroxide 5 % external liquid Wash face, chest and back 1-2 times daily Yes Jordy Payne MD   SUMAtriptan (IMITREX) 6 MG/0.5ML SOLN injection Inject 6 mg into the skin as needed (migraine) No more than 2 doses in a 24 hr period Yes Kay Cerrato MD   SUMAtriptan (IMITREX) 100 MG tablet Take 1 tablet by mouth once as needed for Migraine No more than 2 doses in 24 hrs Yes Kay Cerrato MD   Aspirin-Acetaminophen-Caffeine (EXCEDRIN EXTRA STRENGTH PO) Take by mouth as needed  Yes Historical Provider, MD   Acetaminophen (TYLENOL EXTRA STRENGTH PO) Take 2 tablets by mouth as needed Yes Historical Provider, MD   tiZANidine (ZANAFLEX) 4 MG tablet Take 4 mg by mouth every 8 hours as needed Yes Historical Provider, MD   lidocaine (XYLOCAINE) 5 % ointment Apply topically as needed.  Yes Kay Cerrato MD   loratadine (CLARITIN) 10 MG tablet take 1 tablet by mouth once daily  Alexandr Jennings MD   Cholecalciferol (VITAMIN D3) 50 MCG (2000 UT) CAPS take 1 capsule by mouth once daily  Alexandr Jennings MD   albuterol sulfate  (90 Base) MCG/ACT inhaler inhale 2 puffs by mouth and INTO THE LUNGS every 6 hours if needed for wheezing or shortness of breath  Alexandr Jennings MD   clindamycin (CLEOCIN T) 1 % lotion Apply to face, chest and back daily  Patient not taking: Reported on 2021  Miley Burnett MD   azelastine (OPTIVAR) 0.05 % ophthalmic solution 1 drop as needed  Patient not taking: Reported on 2021  Historical Provider, MD       Social History     Tobacco Use    Smoking status: Former Smoker     Packs/day: 0.25     Types: Cigarettes     Start date: 1996     Quit date: 10/1/2006     Years since quittin.9    Smokeless tobacco: Never Used    Tobacco comment: half pack a month quit 2006   Vaping Use    Vaping Use: Never used   Substance Use Topics    Alcohol use: Yes     Comment: social     Drug use: Yes     Types: Marijuana     Comment: edible cannabis for pain        Allergies   Allergen Reactions    Bee Venom Anaphylaxis    Dexamethasone Shortness Of Breath     Hives and hot flashes    Other reaction(s): Unknown    Prednisone      CAUSES HEADACHE, RASH, hard to breathe    Benzocaine Rash     Mouth numbing medicine- gums and lips swell  Other reaction(s): Unknown    Cyclopentasiloxane Itching and Swelling    Dimethicone Itching and Swelling    Dimethiconol Itching and Swelling    Food Hives     Oysters, clams, mussels, scallops--mollusks    Silicone      Swell up--lubricant    Sulfamethoxazole-Trimethoprim Hives     Bactrim- hives  Other reaction(s): Unknown    Tetanus Toxoids      Hives, arm swells up    Bactrim [Sulfamethoxazole-Trimethoprim] Hives    Cortisone      HIVES    Seasonal      Hayfever    ,   Past Medical History:   Diagnosis Date    Allergic rhinitis     Aortic stenosis     SEES DR Fries Bound (AS since birth-used to follow with peds cardiology)    Arthritis     Asthma     Cancer of endocervical canal ([facility] [address] 01/17/2020    Enlarged tonsils 12/8/2017    GERD (gastroesophageal reflux disease)     History of echocardiogram 2016    Hyperlipidemia 09/15/2015    Movement disorder     Neuropathy     left LE with numbness    Numbness and tingling     HANDS AND FEET/SEES DR. Yoshi Saxena    Petit mal ([facility] [address]     last time 11/2019    Prolonged emergence from general anesthesia     Psoriasis     Pulmonary stenosis     Ringing in ears     SEES AN ENT    Sacroiliac joint dysfunction of both sides 06/18/2019    Sacroiliitis ([facility] [address] 06/18/2019    Symptomatic cholelithiasis     Wears glasses    ,   Past Surgical History:   Procedure Laterality Date    COLONOSCOPY  01/08/2018    COLON, RANDOM BIOPSIES: MILD FOCAL ACTIVE COLITIS, HEMORRHOIDS    COLPOSCOPY  12/20/2019    Dr. Dorothy Guillen N/A 02/17/2020    LAPAROSCOPIC XI ROBOTIC TOTAL HYSTERECTOMY, BILATERAL SALPINGECTOMY, CYTOLOGIC WASHINGS, CYSTOSCOPY performed by Grayson Lazo MD at [address], TOTAL ABDOMINAL  02/17/2020    ovaries are still present    LEEP  2008    NERVE BLOCK  06/2019    NERVE BLOCK  07/2019    NE COLONOSCOPY W/BIOPSY SINGLE/MULTIPLE N/A 01/08/2018    COLONOSCOPY WITH BIOPSY performed by Jeremy Mujica MD at [address] EGD TRANSORAL BIOPSY SINGLE/MULTIPLE N/A 01/08/2018    EGD BIOPSY performed by Jeremy Mujica MD at [facility] N/A 04/27/2018    [address] [facility]      may 2021   [address] [place] [address]  01/08/2016       PHYSICAL EXAMINATION:  [ INSTRUCTIONS:  \"[x]\" Indicates a positive item  \"[]\" Indicates a negative item  -- DELETE ALL ITEMS NOT EXAMINED]  Vital Signs: (As obtained by patient/caregiver or practitioner observation)    Blood pressure-  Heart rate-    Respiratory rate-    Temperature-  Pulse oximetry-     Constitutional: [x] Appears well-developed and well-nourished [x] No apparent distress      [] Abnormal-   Mental status  [x] Alert and awake  [x] Oriented to person/place/time []Able to follow commands      Eyes:  EOM    [x]  Normal  [] Abnormal-  Sclera  [x]  Normal  [] Abnormal -         Discharge [x]  None visible  [] Abnormal -    HENT:   [x] Normocephalic, atraumatic. [] Abnormal   [x] Mouth/Throat: Mucous membranes are moist.     External Ears [x] Normal  [] Abnormal-     Neck: [x] No visualized mass     Pulmonary/Chest: [x] Respiratory effort normal.  [x] No visualized signs of difficulty breathing or respiratory distress        [] Abnormal-      Musculoskeletal:   [x] Normal gait with no signs of ataxia         [x] Normal range of motion of neck        [] Abnormal-       Neurological:        [x] No Facial Asymmetry (Cranial nerve 7 motor function) (limited exam to video visit)          [x] No gaze palsy        [] Abnormal-         Skin:        [x] No significant exanthematous lesions or discoloration noted on facial skin         [] Abnormal-            Psychiatric:       [x] Normal Affect [x] No Hallucinations        [] Abnormal-     Other pertinent observable physical exam findings-     ASSESSMENT/PLAN:  1.  Allergic rhinitis due to animal hair and dander  Patient recommended to get thorough allergy testing done. Will refer the patient to allergy immunology for this. Patient to continue on allergy medications for now. Silvana Buckley MD, Allergy & Immunology, [facility]    2. Mild persistent asthma with acute exacerbation  Patient to use albuterol as needed. She is allergic with anaphylactic reaction to steroids. I am referring her to allergy immunology but she does not require her rescue inhaler to be available to her 24/7. Patient states this was denied by her insurance. We will write a letter if they deny it again today. - albuterol sulfate  (90 Base) MCG/ACT inhaler; Inhale 2 puffs into the lungs every 6 hours as needed for Wheezing  Dispense: 18 g; Refill: 3    3. Hyperlipidemia, unspecified hyperlipidemia type  Check cholesterol once a year. 4. Hypothyroidism, unspecified type  Check TSH once a year. - TSH With Reflex Ft4; Future    5. Vitamin D deficiency  Check vitamin D once year. - Vitamin D 25 Hydroxy; Future    6. Leukocytosis, unspecified type  Monitor CBC. If abnormal will refer back to hematology. - CBC Auto Differential; Future    7. Mixed hyperlipidemia    - Lipid, Fasting; Future  - Comprehensive Metabolic Panel, Fasting; Future    8. Adenocarcinoma in situ (AIS) of uterine cervix  Patient following closely with gynecology oncology. 9. Murmur  Patient sees cardiology. No follow-ups on file. Andrea Antonio. Tori Saucedo, was evaluated through a synchronous (real-time) audio-video encounter. The patient (or guardian if applicable) is aware that this is a billable service. Verbal consent to proceed has been obtained within the past 12 months. The visit was conducted pursuant to the emergency declaration under the [facility], [address] [place] [address] wa[facility] Hospitals authority and the [facility] and [employer] General Act.   Patient identification was verified, and a caregiver was present when appropriate. The patient was located in a state where the provider was credentialed to provide care. Total time spent on this encounter: Not billed by time    --Thuy Cardona MD on 9/23/2021 at 9:56 AM    An electronic signature was used to authenticate this note.

## 2021-09-27 ENCOUNTER — PATIENT MESSAGE (OUTPATIENT)
Dept: FAMILY MEDICINE CLINIC | Age: 41
End: 2021-09-27

## 2021-09-28 RX ORDER — IBUPROFEN 800 MG/1
800 TABLET ORAL PRN
Qty: 120 TABLET | Refills: 0 | Status: SHIPPED | OUTPATIENT
Start: 2021-09-28 | End: 2021-12-20

## 2021-09-28 NOTE — TELEPHONE ENCOUNTER
From: Eleanor Bowers. Margret Miller  To: Rachael Gutierres MD  Sent: 9/27/2021 8:09 PM EDT  Subject: Visit Follow-Up Question    I totally forgot, while on our video chat appointment, to ask about getting a new script for the 800mg ibuprofen. now that i can actually swallow meds better since my tonsils were removed. it would so much easier to take 1 pill instead of 4 smaller ones when i need them.      thank you  Aury Pimentel

## 2021-09-30 DIAGNOSIS — L73.9 CHRONIC FOLLICULITIS: ICD-10-CM

## 2021-12-03 DIAGNOSIS — Z80.3 FAMILY HISTORY OF BREAST CANCER: ICD-10-CM

## 2021-12-03 DIAGNOSIS — Z12.31 SCREENING MAMMOGRAM, ENCOUNTER FOR: Primary | ICD-10-CM

## 2021-12-03 DIAGNOSIS — Z91.89 AT HIGH RISK FOR BREAST CANCER: ICD-10-CM

## 2021-12-07 ENCOUNTER — OFFICE VISIT (OUTPATIENT)
Dept: NEUROLOGY | Age: 41
End: 2021-12-07
Payer: MEDICARE

## 2021-12-07 VITALS
TEMPERATURE: 97.5 F | BODY MASS INDEX: 36.25 KG/M2 | DIASTOLIC BLOOD PRESSURE: 68 MMHG | HEIGHT: 62 IN | SYSTOLIC BLOOD PRESSURE: 105 MMHG | HEART RATE: 83 BPM | WEIGHT: 197 LBS

## 2021-12-07 DIAGNOSIS — G43.009 MIGRAINE WITHOUT AURA AND WITHOUT STATUS MIGRAINOSUS, NOT INTRACTABLE: ICD-10-CM

## 2021-12-07 PROCEDURE — 99214 OFFICE O/P EST MOD 30 MIN: CPT | Performed by: NURSE PRACTITIONER

## 2021-12-07 NOTE — PROGRESS NOTES
[facility]            [address]          [place], [address] [facility]          Dept: [phone]          Dept Fax: [phone]    MD Myrna Balderas MD Ahmed B. Elta Naegeli, MD Steele Arias, MD Romell Grumbles, CNP            12/7/2021      HISTORY OF PRESENT ILLNESS:       I had the pleasure of seeing Fernandez Arzola, who returns for continuing neurologic care. The patient was seen last on December 15, 2020 for treatment of chronic migraine headaches. The patient was previously seen by Dr. Godwin Connor and all of the records and diagnostic studies were carefully reviewed. For prophylaxis of her migraine headaches she is prescribed aimovig 140 mg injections every 30 days. For abortive therapy she is prescribed oral imitrex and injectable imitrex. She is here today reporting that she has been having allergic reactions to the imitrex. Her allergic reactions are described as brain fog, chest tightness, and and increased body temperature. She reports today that she has stopped taking aimovig as she believes her headaches were caused by environmental hazards. She is currently getting 1-2 migraine headaches/month which are an 8/10 in intensity. Headache location: bifrontal and bitemporal   Headache quality: pounding and throbbing  Associated factors:  nausea, Photophobia, Phonophobia  Intensity: /10  Headache chronicity: since 2012  Headache frequency: 1-2 headaches/month  Aggravating factors : bright lights, loud sounds, physical activity  Relieving factors: partially by rest and sleep, imitrex  Prophylactic medications: aimovig injections  Abortive medications: oral imitrex,  Previously used medications: topamax, amitriptyline          Testing reviewed:    EMG 9/16/2019: This is a normal electrophysiological study. Dylan Duff is no evidence of large fiber sensorimotor neuropathy.  Clinical correlation is recommended.   EMG 12/4/17: No evidence of neuropathy, plexopathy, radiculopathy or myopathy On the right upper and lower extremities  MRI brain without contrast 3/14/2016: No acute intracranial abnormality      PAST MEDICAL HISTORY:         Diagnosis Date    Allergic rhinitis     Aortic stenosis     SEES DR Shana Wright (AS since birth-used to follow with peds cardiology)    Arthritis     Asthma     Cancer of endocervical canal ([facility] [address] 01/17/2020    Enlarged tonsils 12/8/2017    GERD (gastroesophageal reflux disease)     History of echocardiogram 2016    Hyperlipidemia 09/15/2015    Movement disorder     Neuropathy     left LE with numbness    Numbness and tingling     HANDS AND FEET/SEES DR. Sandra ramirez ([facility] [address]     last time 11/2019    Prolonged emergence from general anesthesia     Psoriasis     Pulmonary stenosis     Ringing in ears     SEES AN ENT    Sacroiliac joint dysfunction of both sides 06/18/2019    Sacroiliitis ([facility] [address] 06/18/2019    Symptomatic cholelithiasis     Wears glasses         PAST SURGICAL HISTORY:         Procedure Laterality Date    COLONOSCOPY  01/08/2018    COLON, RANDOM BIOPSIES: MILD FOCAL ACTIVE COLITIS, HEMORRHOIDS    COLPOSCOPY  12/20/2019    Dr. Vaibhav Hills N/A 02/17/2020    LAPAROSCOPIC XI ROBOTIC TOTAL HYSTERECTOMY, BILATERAL SALPINGECTOMY, CYTOLOGIC WASHINGS, CYSTOSCOPY performed by Jonathan Knox MD at [address] [place] [address], TOTAL ABDOMINAL  02/17/2020    ovaries are still present    LEEP  2008    NERVE BLOCK  06/2019    NERVE BLOCK  07/2019    [place] COLONOSCOPY W/BIOPSY SINGLE/MULTIPLE N/A 01/08/2018    COLONOSCOPY WITH BIOPSY performed by Tere Rich MD at [address] EGD TRANSORAL BIOPSY SINGLE/MULTIPLE N/A 01/08/2018    EGD BIOPSY performed by Tere Rich MD at [facility] N/A 04/27/2018    [address] performed by Char Hernandez MD at [address] [place] [address] may 2021    WISDOM TOOTH EXTRACTION  01/08/2016        SOCIAL HISTORY:     Social History     Socioeconomic History    Marital status:      Spouse name: Not on file    Number of children: Not on file    Years of education: Not on file    Highest education level: Not on file   Occupational History    Not on file   Tobacco Use    Smoking status: Former Smoker     Packs/day: 0.25     Types: Cigarettes     Start date: 1/1/1996     Quit date: 10/1/2006     Years since quitting: 15.1    Smokeless tobacco: Never Used    Tobacco comment: half pack a month quit 2006   Vaping Use    Vaping Use: Never used   Substance and Sexual Activity    Alcohol use: Yes     Comment: social     Drug use: Yes     Types: Marijuana (Weed)     Comment: edible cannabis for pain    Sexual activity: Yes     Partners: Male   Other Topics Concern    Not on file   Social History Narrative    Not on file     Social Determinants of Health     Financial Resource Strain: Low Risk     Difficulty of Paying Living Expenses: Not very hard   Food Insecurity: No Food Insecurity    Worried About Running Out of Food in the Last Year: Never true    Kailee of Food in the Last Year: Never true   Transportation Needs: No Transportation Needs    Lack of Transportation (Medical): No    Lack of Transportation (Non-Medical):  No   Physical Activity:     Days of Exercise per Week: Not on file    Minutes of Exercise per Session: Not on file   Stress:     Feeling of Stress : Not on file   Social Connections:     Frequency of Communication with Friends and Family: Not on file    Frequency of Social Gatherings with Friends and Family: Not on file    Attends [employer] Services: Not on file    Active Member of Clubs or Organizations: Not on file    Attends Club or Organization Meetings: Not on file    Marital Status: Not on file   Intimate Partner Violence:     Fear of Current or Ex-Partner: Not on file    Emotionally Abused: Not on flashes    Other reaction(s): Unknown    Prednisone      CAUSES HEADACHE, RASH, hard to breathe    Benzocaine Rash     Mouth numbing medicine- gums and lips swell  Other reaction(s): Unknown    Cyclopentasiloxane Itching and Swelling    Dimethicone Itching and Swelling    Dimethiconol Itching and Swelling    Food Hives     Oysters, clams, mussels, scallops--mollusks    Silicone      Swell up--lubricant    Sulfamethoxazole-Trimethoprim Hives     Bactrim- hives  Other reaction(s): Unknown    Tetanus Toxoids      Hives, arm swells up    Bactrim [Sulfamethoxazole-Trimethoprim] Hives    Cortisone      HIVES    Seasonal      Hayfever                                  REVIEW OF SYSTEMS        All items selected indicate a positive finding. Those items not selected are negative. Constitutional [] Weight loss/gain   [] Fatigue  [] Fever/Chills   HEENT [] Hearing Loss  [] Visual Disturbance  [] Tinnitus  [] Eye pain   Respiratory [] Shortness of Breath  [] Cough  [] Snoring   Cardiovascular [] Chest Pain  [] Palpitations  [] Lightheaded   GI [] Constipation  [] Diarrhea  [] Swallowing change  [x] Nausea/vomiting    [] Urinary Frequency  [] Urinary Urgency   Musculoskeletal [] Neck pain  [] Back pain  [] Muscle pain  [] Restless legs   Dermatologic [] Skin changes   Neurologic [] Memory loss/confusion  [] Seizures  [] Trouble walking or imbalance  [] Dizziness  [] Sleep disturbance  [] Weakness  [] Numbness  [] Tremors  [] Speech Difficulty  [x] Headaches  [x] Light Sensitivity  [x] Sound Sensitivity   Endocrinology []Excessive thirst  []Excessive hunger   Psychiatric [] Anxiety/Depression  [] Hallucination   Allergy/immunology []Hives/environmental allergies   Hematologic/lymph [] Abnormal bleeding  [] Abnormal bruising         PHYSICAL EXAMINATION:       Vitals:    12/07/21 0748   BP: 105/68   Pulse: 83   Temp: 97.5 °F (36.4 °C)                                              . General Appearance:  Alert, cooperative, no signs of distress, appears stated age   Head:  Normocephalic, no signs of trauma   Eyes:  Conjunctiva/corneas clear;  eyelids intact   Ears:  Normal external ear and canals   Nose: Nares normal, mucosa normal, no drainage    Throat: Lips and tongue normal; teeth normal;  gums normal   Neck: Supple, intact flexion, extension and rotation;   trachea midline;  no adenopathy;   thyroid: not enlarged;   no carotid pulse abnormality   Back:   Symmetric, no curvature, ROM adequate   Lungs:   Respirations unlabored   Heart:  Regular rate and rhythm           Extremities: Extremities normal, no cyanosis, no edema   Pulses: Symmetric over head and neck   Skin: Skin color, texture normal, no rashes, no lesions                                     NEUROLOGIC EXAMINATION    Neurologic Exam  Mental status    Alert and oriented x 3; intact memory with no confusion, speech or language problems; no hallucinations or delusions  Fund of information appropriate for level of education    Cranial nerves    II - visual fields intact to confrontation bilaterally  III, IV, VI - extra-ocular muscles full: no pupillary defect; no ROBBIE, no nystagmus, no ptosis   V - normal facial sensation                                                               VII - normal facial symmetry                                                             VIII - intact hearing                                                                             IX, X - symmetrical palate                                                                  XI - symmetrical shoulder shrug                                                       XII - tongue midline without atrophy or fasciculation      Motor function  Normal muscle bulk and tone; strength 5/5 on all 4 extremities, no pronator drift      Sensory function Intact to light touch, pinprick, vibration, proprioception on all 4 extremities      Cerebellar Intact fine motor movement. No involuntary movements or tremors. No ataxia or dysmetria on finger to nose or heel to shin testing      Reflex function DTR 2+ on bilateral UE and LE, symmetric. Down going toes bilaterally      Gait                   normal base and arm swing                  Medical Decision Making: In summary, your patient, Edda Reddy exhibits the following, with associated plan:    1. Chronic migraine headaches without aura, currently getting 1-2 headaches/month. Because of the patients pulmonary and cardiac conditions triptans are relatively contraindicated. 1. The patient will stop aimovig as she believes her headaches were caused by environmental hazards. I discouraged her from stopping the medication however she wishes to stop the medication for a few months to see if her headaches return. 2. Continue oral imitrex  3. Start ubrelvy for abortive therapy as the patient has cardiac issues when taking tripitans  4. Return for follow up visit on annual basis            Signed: Giorgi Rodriguez CNP      *Please note that portions of this note were completed with a voice recognition program.  Although every effort was made to insure the accuracy of this automated transcription, some errors in transcription may have occurred, occasionally words and are mis-transcribed    Provider Attestation: The documentation recorded by the scribe accurately reflects the service I personally performed and the decisions made by myself. Portions of this note were transcribed by a scribe. I personally performed the history, physical exam, and medical decision-making and confirm the accuracy of the information in the transcribed note. Scribe Attestation:   By signing my name below, Ernie Wasserman, attest that this documentation has been prepared under the direction and in the presence of Giorgi Rodriguez CNP.

## 2021-12-20 RX ORDER — IBUPROFEN 800 MG/1
TABLET ORAL
Qty: 120 TABLET | Refills: 0 | Status: SHIPPED | OUTPATIENT
Start: 2021-12-20 | End: 2022-04-19 | Stop reason: SDUPTHER

## 2021-12-20 NOTE — TELEPHONE ENCOUNTER
Sunday Patrizia.  Shaniqua Palomo is calling to request a refill on the following medication(s):    Medication Request:  Requested Prescriptions     Pending Prescriptions Disp Refills    ibuprofen (ADVIL;MOTRIN) 800 MG tablet [Pharmacy Med Name: IBUPROFEN 800 MG TABLET] 120 tablet 0     Sig: take 1 tablet by mouth every 8 hours if needed for pain       Last Visit Date (If Applicable):  6/15/7457    Next Visit Date:    Visit date not found

## 2021-12-27 ENCOUNTER — HOSPITAL ENCOUNTER (OUTPATIENT)
Dept: MAMMOGRAPHY | Age: 41
Discharge: HOME | End: 2021-12-29
Payer: MEDICARE

## 2021-12-27 VITALS — WEIGHT: 197 LBS | HEIGHT: 62 IN | BODY MASS INDEX: 36.25 KG/M2

## 2021-12-27 DIAGNOSIS — Z91.89 AT HIGH RISK FOR BREAST CANCER: ICD-10-CM

## 2021-12-27 DIAGNOSIS — Z12.31 SCREENING MAMMOGRAM, ENCOUNTER FOR: ICD-10-CM

## 2021-12-27 DIAGNOSIS — Z80.3 FAMILY HISTORY OF BREAST CANCER: ICD-10-CM

## 2021-12-27 PROCEDURE — 77063 BREAST TOMOSYNTHESIS BI: CPT

## 2022-01-04 ENCOUNTER — TELEPHONE (OUTPATIENT)
Dept: NEUROLOGY | Age: 42
End: 2022-01-04

## 2022-01-04 NOTE — TELEPHONE ENCOUNTER
Received a fax from pharmacy stating [facility] [place] needs PA. This was faxed to [facility].

## 2022-01-05 ENCOUNTER — OFFICE VISIT (OUTPATIENT)
Dept: DERMATOLOGY | Age: 42
End: 2022-01-05
Payer: MEDICARE

## 2022-01-05 ENCOUNTER — HOSPITAL ENCOUNTER (OUTPATIENT)
Age: 42
Setting detail: SPECIMEN
Discharge: HOME | End: 2022-01-05

## 2022-01-05 VITALS
OXYGEN SATURATION: 97 % | WEIGHT: 196.8 LBS | HEIGHT: 62 IN | SYSTOLIC BLOOD PRESSURE: 118 MMHG | TEMPERATURE: 97.4 F | DIASTOLIC BLOOD PRESSURE: 81 MMHG | HEART RATE: 80 BPM | BODY MASS INDEX: 36.22 KG/M2

## 2022-01-05 DIAGNOSIS — B36.0 TINEA VERSICOLOR: Primary | ICD-10-CM

## 2022-01-05 DIAGNOSIS — D48.5 NEOPLASM OF UNCERTAIN BEHAVIOR OF SKIN: ICD-10-CM

## 2022-01-05 DIAGNOSIS — L73.9 CHRONIC FOLLICULITIS: ICD-10-CM

## 2022-01-05 PROCEDURE — G8484 FLU IMMUNIZE NO ADMIN: HCPCS | Performed by: DERMATOLOGY

## 2022-01-05 PROCEDURE — 11104 PUNCH BX SKIN SINGLE LESION: CPT | Performed by: DERMATOLOGY

## 2022-01-05 PROCEDURE — G8417 CALC BMI ABV UP PARAM F/U: HCPCS | Performed by: DERMATOLOGY

## 2022-01-05 PROCEDURE — G8427 DOCREV CUR MEDS BY ELIG CLIN: HCPCS | Performed by: DERMATOLOGY

## 2022-01-05 PROCEDURE — 1036F TOBACCO NON-USER: CPT | Performed by: DERMATOLOGY

## 2022-01-05 PROCEDURE — 99213 OFFICE O/P EST LOW 20 MIN: CPT | Performed by: DERMATOLOGY

## 2022-01-05 RX ORDER — LIDOCAINE HYDROCHLORIDE AND EPINEPHRINE 10; 10 MG/ML; UG/ML
20 INJECTION, SOLUTION INFILTRATION; PERINEURAL ONCE
Status: COMPLETED | OUTPATIENT
Start: 2022-01-05 | End: 2022-01-05

## 2022-01-05 RX ORDER — KETOCONAZOLE 20 MG/ML
SHAMPOO TOPICAL
Qty: 120 ML | Refills: 3 | Status: SHIPPED | OUTPATIENT
Start: 2022-01-05

## 2022-01-05 RX ADMIN — LIDOCAINE HYDROCHLORIDE AND EPINEPHRINE 20 ML: 10; 10 INJECTION, SOLUTION INFILTRATION; PERINEURAL at 14:52

## 2022-01-05 NOTE — PATIENT INSTRUCTIONS

## 2022-01-05 NOTE — PROGRESS NOTES
wall.  In the   proper clinical setting, acalculous cholecystitis could give this appearance. If indicated, a nuclear medicine hepatobiliary study may be helpful for   further evaluation           Need for pneumococcal vaccination 08/21/2017    Vitamin D deficiency 10/17/2016    Hyperlipidemia 09/15/2015    Abnormal TSH 06/16/2015    Asthma 05/05/2015    Murmur 05/05/2015    Seasonal allergies 05/05/2015       CURRENT MEDICATIONS:   Current Outpatient Medications   Medication Sig Dispense Refill    ibuprofen (ADVIL;MOTRIN) 800 MG tablet take 1 tablet by mouth every 8 hours if needed for pain 120 tablet 0    Ubrogepant 100 MG TABS Take one at onset of migraine. May repeat in 2 hours. No more than 2 in 24 hours and 4 in 1 week 10 tablet 5    loratadine (CLARITIN) 10 MG tablet take 1 tablet by mouth once daily 30 tablet 11    RA VITAMIN D-3 50 MCG (2000 UT) CAPS take 1 capsule by mouth once daily 30 capsule 5    benzoyl peroxide 5 % external liquid Wash face, chest and back 1-2 times daily 227 g 3    albuterol sulfate  (90 Base) MCG/ACT inhaler Inhale 2 puffs into the lungs every 6 hours as needed for Wheezing 18 g 3    ondansetron (ZOFRAN) 4 MG tablet take 1 tablet by mouth every 8 hours if needed for NAUSEA OR VOMITING 10 tablet 3    omeprazole (PRILOSEC) 20 MG delayed release capsule take 1 capsule by mouth daily 90 capsule 1    atorvastatin (LIPITOR) 20 MG tablet Take one tab po daily 90 tablet 1    Aspirin-Acetaminophen-Caffeine (EXCEDRIN EXTRA STRENGTH PO) Take by mouth as needed       Acetaminophen (TYLENOL EXTRA STRENGTH PO) Take 2 tablets by mouth as needed      tiZANidine (ZANAFLEX) 4 MG tablet Take 4 mg by mouth every 8 hours as needed      lidocaine (XYLOCAINE) 5 % ointment Apply topically as needed.  1 Tube 0    SUMAtriptan (IMITREX) 100 MG tablet Take 1 tablet by mouth once as needed for Migraine No more than 2 doses in 24 hrs 10 tablet 3     No current facility-administered medications for this visit. ALLERGIES:   Allergies   Allergen Reactions    Bee Venom Anaphylaxis    Dexamethasone Shortness Of Breath     Hives and hot flashes    Other reaction(s): Unknown    Prednisone      CAUSES HEADACHE, RASH, hard to breathe    Benzocaine Rash     Mouth numbing medicine- gums and lips swell  Other reaction(s): Unknown    Cyclopentasiloxane Itching and Swelling    Dimethicone Itching and Swelling    Dimethiconol Itching and Swelling    Food Hives     Oysters, clams, mussels, scallops--mollusks    Silicone      Swell up--lubricant    Sulfamethoxazole-Trimethoprim Hives     Bactrim- hives  Other reaction(s): Unknown    Tetanus Toxoids      Hives, arm swells up    Bactrim [Sulfamethoxazole-Trimethoprim] Hives    Cortisone      HIVES    Seasonal      Hayfever        SOCIAL HISTORY:  Social History     Tobacco Use    Smoking status: Former Smoker     Packs/day: 0.25     Types: Cigarettes     Start date: 1/1/1996     Quit date: 10/1/2006     Years since quitting: 15.2    Smokeless tobacco: Never Used    Tobacco comment: half pack a month quit 2006   Substance Use Topics    Alcohol use: Yes     Comment: social        Pertinent ROS:  Review of Systems  Skin: Denies any new changing, growing or bleeding lesions or rashes except as described in the HPI   Constitutional: Denies fevers, chills, and malaise. PHYSICAL EXAM:   /81   Pulse 80   Temp 97.4 °F (36.3 °C)   Ht 5' 2\" (1.575 m)   Wt 196 lb 12.8 oz (89.3 kg)   LMP 01/09/2020   SpO2 97%   BMI 36.00 kg/m²     The patient is generally well appearing, well nourished, alert and conversational. Affect is normal.    Cutaneous Exam:  Physical Exam  Total body skin exam excluding external genitalia: head/face, neck, both arms, chest, back, abdomen, both legs, buttocks, digits and/or nails, was examined. Genital exam was deferred as patient denied having any lesions in this area.  Complete visualization of scalp may be limited by hair density, length, and/or style    Facial covering was not removed during examination. Diagnoses/exam findings/medical history pertinent to this visit are listed below:    Assessment:   Diagnosis Orders   1. Tinea versicolor     2. Chronic folliculitis     3. Neoplasm of uncertain behavior of skin          Plan:  NUB of right lower leg, painful, suspect dermatofibroma  Punch Biopsy: The procedure and its risks were explained including but not limited to pain, bleeding, infection, permanent scar, permanent pigment alteration and need for an additional procedure. Consent to proceed with the procedure was obtained from the patient or the parent. After cleaning with alcohol the lesion was anesthetized with 1% lidocaine with epinephrine and a 4 mm punch was performed. Hemostasis was achieved with suture closure of the defect with 4-0 Prolene and Vaseline and a bandage were applied. Tinea versicolor of back and left thigh  +KOH  - ketoconazole shampoo, use daily until clear then use 3-4x per week     Chronic folliculitis/mild HS  - continue BPO wash     RTC 6 months with Gwen Cm     Future Appointments   Date Time Provider Skylar Foster   2/25/2022  9:30 AM Marlene Alexandre [place] [facility]   3/15/2022 10:00 AM Xiomara Turcios MD [facility]   12/7/2022  8:00 AM FRANDY Floyd - CNP Neuro [facility]         Patient Instructions   BIOPSY WOUND CARE    A biopsy is where a small piece of skin tissue is removed and examined by a pathologist.  When a biopsy is done, there is a small wound site that requires proper care to prevent infection and scarring. Some biopsies require sutures and their removal.    How to Care for Biopsy Wound    A.  Leave band-aid or dressing on for 24 hours. B. Wash two times a day with soap and water. C.  Let the wound air dry, then apply Vaseline ointment and cover with a Band-Aid       unless otherwise instructed by your provider. D.   If there is slight discomfort, you may give acetaminophen or ibuprofen. When To Call the Doctor    Call the Dermatology Clinic or your doctor if any of the following occur:    A. Redness and swelling  B. Tenderness and warm to touch  C.  Drainage from wound  D. Fever    Biopsy Results    Biopsy results are usually available in 1-2 weeks. We provide biopsy results in letters or via MyChart for benign results or we will call for any concerning results. If you have not heard from our staff please call the office within 2 weeks. Please call our office with any concerns at [phone]. This note was created with the assistance of a speech-recognition program.  Although the intention is to generate a document that actually reflects the content of the visit, no guarantees can be provided that every mistake has been identified and corrected by editing. I, Dr. Willow Treviño, personally performed the services described in this documentation, as scribed by [facility] in my presence, and it is both accurate and complete.      Electronically signed by Rizwana Rosa MD on 1/5/22 at 10:48 AM EST

## 2022-01-10 LAB — DERMATOLOGY PATHOLOGY REPORT: NORMAL

## 2022-01-10 NOTE — RESULT ENCOUNTER NOTE
We have received and reviewed your biopsy results which demonstrated a benign skin lesion called a dermatofibroma. No further treatment of this lesion is needed at this time.

## 2022-01-20 RX ORDER — ATORVASTATIN CALCIUM 20 MG/1
TABLET, FILM COATED ORAL
Qty: 90 TABLET | Refills: 1 | Status: SHIPPED | OUTPATIENT
Start: 2022-01-20 | End: 2022-07-15

## 2022-01-20 NOTE — TELEPHONE ENCOUNTER
Requested Prescriptions     Pending Prescriptions Disp Refills    atorvastatin (LIPITOR) 20 MG tablet [Pharmacy Med Name: ATORVASTATIN 20 MG TABLET] 90 tablet 1     Sig: take 1 tablet by mouth once daily

## 2022-01-25 ENCOUNTER — NURSE ONLY (OUTPATIENT)
Dept: DERMATOLOGY | Age: 42
End: 2022-01-25

## 2022-01-25 VITALS — TEMPERATURE: 98 F

## 2022-01-25 DIAGNOSIS — Z48.02 VISIT FOR SUTURE REMOVAL: Primary | ICD-10-CM

## 2022-01-25 PROCEDURE — 99024 POSTOP FOLLOW-UP VISIT: CPT | Performed by: DERMATOLOGY

## 2022-01-25 NOTE — PROGRESS NOTES
Patient presents for suture removal of the right upper thigh. The wound is well healed without signs of infection. The sutures are removed. Wound care and activity instructions given. Return prn.

## 2022-02-24 DIAGNOSIS — K21.9 GASTROESOPHAGEAL REFLUX DISEASE WITHOUT ESOPHAGITIS: ICD-10-CM

## 2022-02-24 RX ORDER — OMEPRAZOLE 20 MG/1
20 CAPSULE, DELAYED RELEASE ORAL DAILY
Qty: 90 CAPSULE | Refills: 1 | Status: SHIPPED | OUTPATIENT
Start: 2022-02-24 | End: 2022-08-16

## 2022-02-24 NOTE — TELEPHONE ENCOUNTER
Yaima Watts.  Blanca Wilson is calling to request a refill on the following medication(s):    Medication Request:  Requested Prescriptions     Pending Prescriptions Disp Refills    omeprazole (PRILOSEC) 20 MG delayed release capsule 90 capsule 1     Sig: Take 1 capsule by mouth Daily       Last Visit Date (If Applicable):  2/74/5151    Next Visit Date:    Visit date not found

## 2022-02-25 ENCOUNTER — OFFICE VISIT (OUTPATIENT)
Dept: GYNECOLOGIC ONCOLOGY | Age: 42
End: 2022-02-25
Payer: MEDICARE

## 2022-02-25 VITALS
TEMPERATURE: 98.4 F | HEIGHT: 62 IN | OXYGEN SATURATION: 97 % | DIASTOLIC BLOOD PRESSURE: 74 MMHG | BODY MASS INDEX: 36.62 KG/M2 | WEIGHT: 199 LBS | SYSTOLIC BLOOD PRESSURE: 114 MMHG | HEART RATE: 98 BPM

## 2022-02-25 DIAGNOSIS — Z98.890 POSTOPERATIVE STATE: ICD-10-CM

## 2022-02-25 DIAGNOSIS — D06.9 ADENOCARCINOMA IN SITU (AIS) OF UTERINE CERVIX: Primary | ICD-10-CM

## 2022-02-25 PROCEDURE — G8484 FLU IMMUNIZE NO ADMIN: HCPCS | Performed by: PHYSICIAN ASSISTANT

## 2022-02-25 PROCEDURE — 99214 OFFICE O/P EST MOD 30 MIN: CPT | Performed by: PHYSICIAN ASSISTANT

## 2022-02-25 PROCEDURE — G8427 DOCREV CUR MEDS BY ELIG CLIN: HCPCS | Performed by: PHYSICIAN ASSISTANT

## 2022-02-25 PROCEDURE — G8417 CALC BMI ABV UP PARAM F/U: HCPCS | Performed by: PHYSICIAN ASSISTANT

## 2022-02-25 PROCEDURE — 1036F TOBACCO NON-USER: CPT | Performed by: PHYSICIAN ASSISTANT

## 2022-02-25 ASSESSMENT — ENCOUNTER SYMPTOMS
RESPIRATORY NEGATIVE: 1
BACK PAIN: 1
GASTROINTESTINAL NEGATIVE: 1
EYES NEGATIVE: 1

## 2022-02-25 NOTE — PROGRESS NOTES
[address] [facility], [facility], [address] [facility] [address] Odell Homans present for her adenocarcinoma in situ of the cervix surveillance visit. CC/HPI: She is a  AB3 female has a history of adenocarcinoma in situ of the endocervix and has undergone a robotic laparoscopic abdominal hysterectomy, bilateral salpingectomies, peritoneal washings for cytology, cystoscopy and biopsy of vesicouterine peritoneum on 2020. The patient initially was following with her OB/GYN for her annual wellness care. A Pap smear was obtained on 2019 which revealed ASCUS, HPV 16+. She later returned for colposcopy on 2019 and endocervical curettings revealed atypical endocervical glandular epithelium consistent with adenocarcinoma in situ. Two cervical biopsies were taken which also revealed adenocarcinoma in situ. She was then referred to [facility] gynecologic oncology for further evaluation and treatment. Various forms of treatment options were discussed regarding her diagnosis, and the patient had decided that she would like to proceed with a hysterectomy. Tumor markers were obtained preoperatively which revealed a normal limit of CA-125 and CEA on 2020    Final pathology revealed cervix with focal residual adenocarcinoma in situ. No evidence of invasive carcinoma, benign surgical margins. The uterus was also benign and there is no myometrial or serosal lesions identified. Bilateral fallopian tubes were also benign. A bladder flap peritoneal biopsy noted focal endometriosis. Pelvic washings were negative for malignancy. Bilateral ovaries appeared normal and were therefore left in situ. She has followed on surveillance and done well. Recommendations by ASCCP guidelines for management include HPV testing annually for 3 years then test at 3 year intervals for at least 25 years.     Her most recent Pap smear on 2021 was negative for intraepithelial lesion or malignancy. HPV negative. Today, she has no concerns. She denies vaginal bleeding or discharge. No new lumps or bumps. No nausea or vomiting. She is busy planning her wedding for October. She had recent mammogram December 2021, BIRADS 1.    ROS:  I have personally reviewed and agree with the review of systems done by my ancillary staff in the [facility] documentation. Physical Exam:    Vitals:    02/25/22 0931   BP: 114/74   Site: Left Upper Arm   Position: Sitting   Cuff Size: Medium Adult   Pulse: 98   Temp: 98.4 °F (36.9 °C)   SpO2: 97%   Weight: 199 lb (90.3 kg)   Height: 5' 2\" (1.575 m)       General: well- appearing, no acute distress, alert and oriented x 3    HEENT: Thyroid normal size. No cervical or supraclavicular lymphadenopathy. Lungs: Clear to auscultate bilaterally to the bases    No CVA tenderness present bilaterally. Heart: Auscultation reveals regular rate and rhythm. No murmurs or gallops appreciated. Abdomen: Obese, soft. Multiple laparoscopic incision scars present. There are no herniations identified throughout. There are no masses. No detectable organomegaly. No inguinal lymphadenopathy bilaterally. Extremities: No edema, calf tenderness or deformities bilaterally. Pelvic: The patient has normal female external genitalia including, vulva, urethra, vagina, perineum, Bartholin glands. Uterus, bilateral fallopian tubes, and cervix are surgically absent. Speculum examination using a medium size speculum reveals no blood in vaginal vault. The vaginal cuff well-intact with no signs of erythema, induration, separation, or granulation tissue. Bimanual examination: Bladder is non-tender, without mass. There are no palpable vaginal nodules and no other pelvic masses, tenderness or pathology noted.        Assessment/Plan:  Cancer Staging  Adenocarcinoma in situ (AIS) of uterine cervix  Staging form: Cervix Uteri, AJCC 8th Edition  - Clinical stage from 2/20/2020: ycT0, cM0 - Signed by Ivette Cheema MD on 2/20/2020  Staging comments: This was a small area of adenocarcinoma in situ. Cone biopsy revealed no residual disease. All margins clear. Pap smear was positive for P 16 high risk HPV  - Pathologic stage from 2/20/2020: Stage Unknown (ypT0, pNX, cM0) - Signed by Ivette Cheema MD on 2/20/2020  Staging comments: Patient had an adenocarcinoma in situ. Subsequent cold knife cone biopsy revealed no residual disease. All margins negative. Impression: Hx adenocarcinoma in situ of cervix s/p YUMIKO/bilateral salpingectomy and staging biopsies February 2020. Focal residual adenocarcinoma in situ in final specimen, margins negative. No evidence of invasive disease. Most recent pap smear August 2021 negative, HPV negative. Her Surveillance plan is as follows:   1. Return to clinic in 6 months for follow up exam and annual HPV testing    2. She is to call or return to clinic sooner with any questions or concerns    I spent 30 minutes providing care to the patient and >50% of the time was spent counseling and coordinating care, discussing the patient's current situation, reviewing her options, counseling and education her and answering her questions. The patient's pertinent images, labs, and previous records and procedures were reviewed.     Electronically signed by Jigna Wiggins PA-C on 2/25/2022 at 9:25 AM EDT

## 2022-02-25 NOTE — PROGRESS NOTES
Review of Systems   Constitutional: Negative. HENT:  Negative. Eyes: Negative. Respiratory: Negative. Cardiovascular: Negative. Gastrointestinal: Negative. Endocrine: Positive for hot flashes. Genitourinary: Positive for pelvic pain (cramping). Musculoskeletal: Positive for back pain, neck pain and neck stiffness. Skin: Negative. Neurological: Positive for dizziness, headaches and light-headedness. Hematological: Negative.

## 2022-03-07 ENCOUNTER — HOSPITAL ENCOUNTER (OUTPATIENT)
Age: 42
Setting detail: SPECIMEN
Discharge: HOME | End: 2022-03-07

## 2022-03-07 DIAGNOSIS — D72.829 LEUKOCYTOSIS, UNSPECIFIED TYPE: ICD-10-CM

## 2022-03-07 DIAGNOSIS — E78.2 MIXED HYPERLIPIDEMIA: ICD-10-CM

## 2022-03-07 DIAGNOSIS — E03.9 HYPOTHYROIDISM, UNSPECIFIED TYPE: ICD-10-CM

## 2022-03-07 DIAGNOSIS — E55.9 VITAMIN D DEFICIENCY: ICD-10-CM

## 2022-03-07 LAB
ABSOLUTE EOS #: 0.12 K/UL (ref 0–0.44)
ABSOLUTE IMMATURE GRANULOCYTE: 0.05 K/UL (ref 0–0.3)
ABSOLUTE LYMPH #: 3.6 K/UL (ref 1.1–3.7)
ABSOLUTE MONO #: 0.91 K/UL (ref 0.1–1.2)
ALBUMIN SERPL-MCNC: 4.6 G/DL (ref 3.5–5.2)
ALBUMIN/GLOBULIN RATIO: 1.6 (ref 1–2.5)
ALP BLD-CCNC: 73 U/L (ref 35–104)
ALT SERPL-CCNC: 15 U/L (ref 5–33)
ANION GAP SERPL CALCULATED.3IONS-SCNC: 16 MMOL/L (ref 9–17)
AST SERPL-CCNC: 15 U/L
BASOPHILS # BLD: 0 % (ref 0–2)
BASOPHILS ABSOLUTE: 0.03 K/UL (ref 0–0.2)
BILIRUB SERPL-MCNC: 1.01 MG/DL (ref 0.3–1.2)
BUN BLDV-MCNC: 14 MG/DL (ref 6–20)
CALCIUM SERPL-MCNC: 9.7 MG/DL (ref 8.6–10.4)
CHLORIDE BLD-SCNC: 107 MMOL/L (ref 98–107)
CHOLESTEROL, FASTING: 149 MG/DL
CHOLESTEROL/HDL RATIO: 2.4
CO2: 22 MMOL/L (ref 20–31)
CREAT SERPL-MCNC: 0.67 MG/DL (ref 0.5–0.9)
EOSINOPHILS RELATIVE PERCENT: 1 % (ref 1–4)
GFR AFRICAN AMERICAN: >60 ML/MIN
GFR NON-AFRICAN AMERICAN: >60 ML/MIN
GFR SERPL CREATININE-BSD FRML MDRD: ABNORMAL ML/MIN/{1.73_M2}
GLUCOSE FASTING: 76 MG/DL (ref 70–99)
HCT VFR BLD CALC: 45.6 % (ref 36.3–47.1)
HDLC SERPL-MCNC: 63 MG/DL
HEMOGLOBIN: 14.3 G/DL (ref 11.9–15.1)
IMMATURE GRANULOCYTES: 0 %
LDL CHOLESTEROL: 69 MG/DL (ref 0–130)
LYMPHOCYTES # BLD: 30 % (ref 24–43)
MCH RBC QN AUTO: 29.8 PG (ref 25.2–33.5)
MCHC RBC AUTO-ENTMCNC: 31.4 G/DL (ref 28.4–34.8)
MCV RBC AUTO: 95 FL (ref 82.6–102.9)
MONOCYTES # BLD: 8 % (ref 3–12)
NRBC AUTOMATED: 0 PER 100 WBC
PDW BLD-RTO: 13.4 % (ref 11.8–14.4)
PLATELET # BLD: 325 K/UL (ref 138–453)
PMV BLD AUTO: 9.7 FL (ref 8.1–13.5)
POTASSIUM SERPL-SCNC: 4.4 MMOL/L (ref 3.7–5.3)
RBC # BLD: 4.8 M/UL (ref 3.95–5.11)
SEG NEUTROPHILS: 61 % (ref 36–65)
SEGMENTED NEUTROPHILS ABSOLUTE COUNT: 7.27 K/UL (ref 1.5–8.1)
SODIUM BLD-SCNC: 145 MMOL/L (ref 135–144)
TOTAL PROTEIN: 7.4 G/DL (ref 6.4–8.3)
TRIGLYCERIDE, FASTING: 86 MG/DL
TSH SERPL DL<=0.05 MIU/L-ACNC: 3.48 MIU/L (ref 0.3–5)
VITAMIN D 25-HYDROXY: 33.2 NG/ML
WBC # BLD: 12 K/UL (ref 3.5–11.3)

## 2022-03-15 ENCOUNTER — OFFICE VISIT (OUTPATIENT)
Dept: FAMILY MEDICINE CLINIC | Age: 42
End: 2022-03-15
Payer: MEDICARE

## 2022-03-15 VITALS
TEMPERATURE: 97.3 F | OXYGEN SATURATION: 99 % | HEART RATE: 74 BPM | DIASTOLIC BLOOD PRESSURE: 83 MMHG | BODY MASS INDEX: 36.7 KG/M2 | SYSTOLIC BLOOD PRESSURE: 127 MMHG | WEIGHT: 199.4 LBS | HEIGHT: 62 IN

## 2022-03-15 DIAGNOSIS — J45.30 MILD PERSISTENT ASTHMA WITHOUT COMPLICATION: Primary | ICD-10-CM

## 2022-03-15 DIAGNOSIS — E78.5 HYPERLIPIDEMIA, UNSPECIFIED HYPERLIPIDEMIA TYPE: Chronic | ICD-10-CM

## 2022-03-15 DIAGNOSIS — G43.009 MIGRAINE WITHOUT AURA AND WITHOUT STATUS MIGRAINOSUS, NOT INTRACTABLE: ICD-10-CM

## 2022-03-15 PROBLEM — Z23 NEED FOR PNEUMOCOCCAL VACCINATION: Status: RESOLVED | Noted: 2017-08-21 | Resolved: 2022-03-15

## 2022-03-15 PROCEDURE — 99214 OFFICE O/P EST MOD 30 MIN: CPT | Performed by: STUDENT IN AN ORGANIZED HEALTH CARE EDUCATION/TRAINING PROGRAM

## 2022-03-15 PROCEDURE — G8484 FLU IMMUNIZE NO ADMIN: HCPCS | Performed by: STUDENT IN AN ORGANIZED HEALTH CARE EDUCATION/TRAINING PROGRAM

## 2022-03-15 PROCEDURE — G8427 DOCREV CUR MEDS BY ELIG CLIN: HCPCS | Performed by: STUDENT IN AN ORGANIZED HEALTH CARE EDUCATION/TRAINING PROGRAM

## 2022-03-15 PROCEDURE — G8417 CALC BMI ABV UP PARAM F/U: HCPCS | Performed by: STUDENT IN AN ORGANIZED HEALTH CARE EDUCATION/TRAINING PROGRAM

## 2022-03-15 PROCEDURE — 1036F TOBACCO NON-USER: CPT | Performed by: STUDENT IN AN ORGANIZED HEALTH CARE EDUCATION/TRAINING PROGRAM

## 2022-03-15 ASSESSMENT — ENCOUNTER SYMPTOMS
WHEEZING: 0
VOMITING: 0
COUGH: 0
NAUSEA: 0
CHEST TIGHTNESS: 0
SHORTNESS OF BREATH: 0
EYE DISCHARGE: 0
SORE THROAT: 0
ABDOMINAL PAIN: 0
DIARRHEA: 0
CONSTIPATION: 0

## 2022-03-15 ASSESSMENT — PATIENT HEALTH QUESTIONNAIRE - PHQ9
SUM OF ALL RESPONSES TO PHQ QUESTIONS 1-9: 0
2. FEELING DOWN, DEPRESSED OR HOPELESS: 0
1. LITTLE INTEREST OR PLEASURE IN DOING THINGS: 0
SUM OF ALL RESPONSES TO PHQ9 QUESTIONS 1 & 2: 0
SUM OF ALL RESPONSES TO PHQ QUESTIONS 1-9: 0

## 2022-03-15 NOTE — PROGRESS NOTES
[address] PRIMARY CARE  [address] [place] [address] [address] [place] [address]  Dept: [phone]  Dept Fax: [phone] [facility] Kassy Lance is a 46 y.o. female who is a Established patient, who presents today for her medical conditions/complaints as noted below:  Chief Complaint   Patient presents with    [facility] Care    Asthma    Discuss Labs         HPI:     She is here today to follow-up on her chronic conditions and to meet new provider. Says that she is doing well currently her asthma is well controlled on current medications. She follows with ENT due to having significant seasonal allergies. She has hyperlipidemia and her recent lipid panel was normal.  She also has chronic leukocytosis and was evaluated by oncology and was told to just continue to monitor with once a year CBC. She has chronic migraines for which she is on [place] and [place] as needed. She also follows with neurology regarding management. She also has mild mitral regurgitation for which she follows with cardiology regularly. She also has a history of abnormal TSH and was put on levothyroxine which she has stopped taking now. She says that her TSH was abnormal due to her having hysterectomy just a few years ago. Her recent TSH was normal.  She follows with OB/GYN for her routine Pap smears. She also has IBS for which she follows with gastroenterology.       Hemoglobin A1C (%)   Date Value   02/12/2018 5.0             ( goal A1Cis < 7)   No results found for: LABMICR  LDL Cholesterol (mg/dL)   Date Value   03/07/2022 69   03/29/2021 59   01/30/2020 79       (goal LDL is <100)   AST (U/L)   Date Value   03/07/2022 15     ALT (U/L)   Date Value   03/07/2022 15     BUN (mg/dL)   Date Value   03/07/2022 14     BP Readings from Last 3 Encounters:   03/15/22 127/83   02/25/22 114/74   01/05/22 118/81          (goal 120/80)    Past Medical History:   Diagnosis Date    Allergic rhinitis     Aortic stenosis     SEES DR France Cox (AS since birth-used to follow with peds cardiology)    Arthritis     Asthma     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 Gem ramirez ([facility] [address]     last time 11/2019    Prolonged emergence from general anesthesia     Psoriasis     Pulmonary stenosis     Ringing in ears     SEES AN ENT    Sacroiliac joint dysfunction of both sides 06/18/2019    Sacroiliitis ([facility] [address] 06/18/2019    Symptomatic cholelithiasis     Wears glasses       Past Surgical History:   Procedure Laterality Date    COLONOSCOPY  01/08/2018    COLON, RANDOM BIOPSIES: MILD FOCAL ACTIVE COLITIS, HEMORRHOIDS    COLPOSCOPY  12/20/2019    Dr. Rosetta Runner N/A 02/17/2020    LAPAROSCOPIC XI ROBOTIC TOTAL HYSTERECTOMY, BILATERAL SALPINGECTOMY, CYTOLOGIC WASHINGS, CYSTOSCOPY performed by Javier Head MD at [address], TOTAL ABDOMINAL  02/17/2020    ovaries are still present    LEEP  2008    NERVE BLOCK  06/2019    NERVE BLOCK  07/2019    RI COLONOSCOPY W/BIOPSY SINGLE/MULTIPLE N/A 01/08/2018    COLONOSCOPY WITH BIOPSY performed by Priscilla Schwarz MD at [address] EGD TRANSORAL BIOPSY SINGLE/MULTIPLE N/A 01/08/2018    EGD BIOPSY performed by Priscilla Schwarz MD at [facility] N/A 04/27/2018    [address] performed by Ambreen Howell MD at [facility]      may 2021    WISDOM TOOTH EXTRACTION  01/08/2016       Family History   Problem Relation Age of Onset    Diabetes Father     Other Mother         blood disorder    Other Maternal Aunt         blood disorder    Cancer Maternal Aunt breast    Breast Cancer Maternal Aunt     Other Paternal Aunt         chiari malformation type 2     Cancer Paternal Aunt         cervical cancer    Cancer Maternal Grandmother         uterine cancer     Cancer Maternal Grandfather     High Blood Pressure Paternal Grandfather     Heart Disease Paternal Grandfather     Cancer Paternal Grandfather     Other Maternal Aunt         brain aneurysm       Social History     Tobacco Use    Smoking status: Former Smoker     Packs/day: 0.25     Types: Cigarettes     Start date: 1/1/1996     Quit date: 10/1/2006     Years since quitting: 15.4    Smokeless tobacco: Never Used    Tobacco comment: half pack a month quit 2006   Substance Use Topics    Alcohol use: Yes     Comment: social       Current Outpatient Medications   Medication Sig Dispense Refill    diclofenac sodium (VOLTAREN) 1 % GEL apply 2-3 grams three to four times a day ON back as directed      omeprazole (PRILOSEC) 20 MG delayed release capsule Take 1 capsule by mouth Daily 90 capsule 1    atorvastatin (LIPITOR) 20 MG tablet take 1 tablet by mouth once daily 90 tablet 1    benzoyl peroxide 5 % external liquid Wash face, chest and back 1-2 times daily 227 g 3    ketoconazole (NIZORAL) 2 % shampoo Use as body wash leaving on for 5 minutes prior to washing off once daily for 2 weeks then three times weekly 120 mL 3    ibuprofen (ADVIL;MOTRIN) 800 MG tablet take 1 tablet by mouth every 8 hours if needed for pain 120 tablet 0    Ubrogepant 100 MG TABS Take one at onset of migraine. May repeat in 2 hours.   No more than 2 in 24 hours and 4 in 1 week 10 tablet 5    loratadine (CLARITIN) 10 MG tablet take 1 tablet by mouth once daily 30 tablet 11    RA VITAMIN D-3 50 MCG (2000 UT) CAPS take 1 capsule by mouth once daily 30 capsule 5    albuterol sulfate  (90 Base) MCG/ACT inhaler Inhale 2 puffs into the lungs every 6 hours as needed for Wheezing 18 g 3    ondansetron (ZOFRAN) 4 MG tablet take 1 tablet by mouth every 8 hours if needed for NAUSEA OR VOMITING 10 tablet 3    Aspirin-Acetaminophen-Caffeine (EXCEDRIN EXTRA STRENGTH PO) Take by mouth as needed       Acetaminophen (TYLENOL EXTRA STRENGTH PO) Take 2 tablets by mouth as needed      tiZANidine (ZANAFLEX) 4 MG tablet Take 4 mg by mouth every 8 hours as needed      lidocaine (XYLOCAINE) 5 % ointment Apply topically as needed. 1 Tube 0     No current facility-administered medications for this visit. Allergies   Allergen Reactions    Bee Venom Anaphylaxis    Dexamethasone Shortness Of Breath     Hives and hot flashes    Other reaction(s): Unknown    Prednisone      CAUSES HEADACHE, RASH, hard to breathe    Benzocaine Rash     Mouth numbing medicine- gums and lips swell  Other reaction(s): Unknown    Cyclopentasiloxane Itching and Swelling    Dimethicone Itching and Swelling    Dimethiconol Itching and Swelling    Food Hives     Oysters, clams, mussels, scallops--mollusks    Silicone      Swell up--lubricant    Sulfamethoxazole-Trimethoprim Hives     Bactrim- hives  Other reaction(s): Unknown    Tetanus Toxoids      Hives, arm swells up    Imitrex [Sumatriptan]     Bactrim [Sulfamethoxazole-Trimethoprim] Hives    Cortisone      HIVES    Seasonal      Hayfever        Health Maintenance   Topic Date Due    COVID-19 Vaccine (1) Never done    Depression Screen  Never done    Flu vaccine (1) 09/23/2022 (Originally 9/1/2021)    Breast cancer screen  12/27/2022    Lipid screen  03/07/2023    Diabetes screen  03/07/2025    Pneumococcal 0-64 years Vaccine (2 of 2 - PPSV23) 11/14/2047    Hepatitis C screen  Completed    HIV screen  Completed    Hepatitis A vaccine  Aged Out    Hepatitis B vaccine  Aged Out    Hib vaccine  Aged Out    Meningococcal (ACWY) vaccine  Aged Out    Varicella vaccine  Discontinued       Subjective:     Review of Systems   Constitutional: Negative for appetite change, fatigue and fever.    HENT: Negative for congestion, ear pain, hearing loss and sore throat. Eyes: Negative for discharge and visual disturbance. Respiratory: Negative for cough, chest tightness, shortness of breath and wheezing. Cardiovascular: Negative for chest pain, palpitations and leg swelling. Gastrointestinal: Negative for abdominal pain, constipation, diarrhea, nausea and vomiting. Genitourinary: Negative for flank pain, frequency, hematuria and urgency. Musculoskeletal: Negative for arthralgias, gait problem, joint swelling and myalgias. Skin: Negative. Neurological: Negative for dizziness, weakness, numbness and headaches. Psychiatric/Behavioral: Negative. Negative for dysphoric mood. The patient is not nervous/anxious. Objective:     Physical Exam  Vitals reviewed. Constitutional:       Appearance: Normal appearance. She is normal weight. HENT:      Head: Normocephalic and atraumatic. Nose: Nose normal.      Mouth/Throat:      Mouth: Mucous membranes are moist.      Pharynx: Oropharynx is clear. Eyes:      Extraocular Movements: Extraocular movements intact. Conjunctiva/sclera: Conjunctivae normal.      Pupils: Pupils are equal, round, and reactive to light. Cardiovascular:      Rate and Rhythm: Normal rate and regular rhythm. Heart sounds: Normal heart sounds. No murmur heard. No gallop. Pulmonary:      Effort: Pulmonary effort is normal. No respiratory distress. Breath sounds: Normal breath sounds. No stridor. No wheezing. Abdominal:      General: Bowel sounds are normal. There is no distension. Palpations: Abdomen is soft. Tenderness: There is no abdominal tenderness. There is no guarding or rebound. Musculoskeletal:         General: No swelling or tenderness. Normal range of motion. Cervical back: Normal range of motion and neck supple. Skin:     General: Skin is warm and dry. Coloration: Skin is not jaundiced. Findings: No rash. Neurological:      General: No focal deficit present. Mental Status: She is alert and oriented to person, place, and time. Psychiatric:         Mood and Affect: Mood normal.         Behavior: Behavior normal.         Thought Content: Thought content normal.         Judgment: Judgment normal.       /83   Pulse 74   Temp 97.3 °F (36.3 °C)   Ht 5' 2\" (1.575 m)   Wt 199 lb 6.4 oz (90.4 kg)   LMP 01/09/2020   SpO2 99%   BMI 36.47 kg/m²     Assessment/Plan:   1. Mild persistent asthma without complication  2. Hyperlipidemia, unspecified hyperlipidemia type  3. Migraine without aura and without status migrainosus, not intractable    Asthma-stable on albuterol as needed. Not on any controller inhaler. Follows with ENT due significant seasonal allergies. Hyperlipidemia-on atorvastatin 20 mg daily, recent lipid panel normal    Migraines-on Ubrelvy 100 mg as needed, no longer taking Imitrex. Following with neurology    Return in about 6 months (around 9/15/2022) for asthma. No orders of the defined types were placed in this encounter. No orders of the defined types were placed in this encounter. Patient given educational materials - see patient instructions. Discussed use, benefit, and side effects of prescribed medications. All patientquestions answered. Pt voiced understanding. Reviewed health maintenance. Instructedto continue current medications, diet and exercise. Patient agreed with treatmentplan. Follow up as directed.      Electronically signed by Anoop Guillory MD on 3/15/2022 at 10:13 AM

## 2022-04-19 NOTE — TELEPHONE ENCOUNTER
Requested Prescriptions     Pending Prescriptions Disp Refills    ibuprofen (ADVIL;MOTRIN) 800 MG tablet 120 tablet 0     Sig: Take 1 tablet by mouth every 8 hours as needed for Pain

## 2022-04-20 RX ORDER — IBUPROFEN 800 MG/1
800 TABLET ORAL EVERY 8 HOURS PRN
Qty: 120 TABLET | Refills: 0 | Status: SHIPPED | OUTPATIENT
Start: 2022-04-20 | End: 2022-08-16

## 2022-05-18 RX ORDER — ONDANSETRON 4 MG/1
TABLET, FILM COATED ORAL
Qty: 10 TABLET | Refills: 3 | Status: SHIPPED | OUTPATIENT
Start: 2022-05-18 | End: 2022-09-21

## 2022-05-18 NOTE — TELEPHONE ENCOUNTER
Pharmacy requesting refill of zofran 4 mg.       Medication active on med list yes      Date of last Rx: 9/20/2021 with 3 refills          verified by JURGEN, ILANAA      Date of last appointment 12/7/2021    Next Visit Date:  12/7/2022

## 2022-05-19 RX ORDER — GLUCOSAMINE/CHONDR SU A SOD 750-600 MG
TABLET ORAL
Qty: 30 CAPSULE | Refills: 5 | Status: SHIPPED | OUTPATIENT
Start: 2022-05-19

## 2022-05-19 NOTE — TELEPHONE ENCOUNTER
Faxed medication request pended medication please advise thank you! Ankur Madera.  Marin Perez is calling to request a refill on the following medication(s):    Medication Request:  Requested Prescriptions     Pending Prescriptions Disp Refills    Cholecalciferol (RA VITAMIN D-3) 50 MCG (2000 UT) CAPS 30 capsule 5     Sig: take 1 capsule by mouth once daily       Last Visit Date (If Applicable):  9/54/3109    Next Visit Date:    9/13/2022

## 2022-05-23 ENCOUNTER — TELEPHONE (OUTPATIENT)
Dept: OBGYN CLINIC | Age: 42
End: 2022-05-23

## 2022-05-23 NOTE — TELEPHONE ENCOUNTER
Patient phoned office stating needs order for Breast MRI. She is high risk and has her mamm in Dec and breast MRI in June.     Order pended for MRI- bilateral

## 2022-06-08 ENCOUNTER — OFFICE VISIT (OUTPATIENT)
Dept: DERMATOLOGY | Age: 42
End: 2022-06-08
Payer: MEDICARE

## 2022-06-08 VITALS
DIASTOLIC BLOOD PRESSURE: 67 MMHG | SYSTOLIC BLOOD PRESSURE: 101 MMHG | HEART RATE: 85 BPM | TEMPERATURE: 98.4 F | WEIGHT: 200 LBS | HEIGHT: 62 IN | BODY MASS INDEX: 36.8 KG/M2 | OXYGEN SATURATION: 95 %

## 2022-06-08 DIAGNOSIS — L73.2 HIDRADENITIS SUPPURATIVA: Primary | ICD-10-CM

## 2022-06-08 DIAGNOSIS — L21.9 SEBORRHEIC DERMATITIS: ICD-10-CM

## 2022-06-08 PROCEDURE — 99213 OFFICE O/P EST LOW 20 MIN: CPT | Performed by: PHYSICIAN ASSISTANT

## 2022-06-08 PROCEDURE — G8417 CALC BMI ABV UP PARAM F/U: HCPCS | Performed by: PHYSICIAN ASSISTANT

## 2022-06-08 PROCEDURE — G8427 DOCREV CUR MEDS BY ELIG CLIN: HCPCS | Performed by: PHYSICIAN ASSISTANT

## 2022-06-08 PROCEDURE — 1036F TOBACCO NON-USER: CPT | Performed by: PHYSICIAN ASSISTANT

## 2022-06-08 RX ORDER — TIZANIDINE HYDROCHLORIDE 4 MG/1
CAPSULE, GELATIN COATED ORAL
COMMUNITY
End: 2022-09-13

## 2022-06-08 RX ORDER — OMEPRAZOLE 20 MG/1
TABLET, DELAYED RELEASE ORAL
COMMUNITY
End: 2022-09-13

## 2022-06-08 RX ORDER — SPIRONOLACTONE 50 MG/1
50 TABLET, FILM COATED ORAL DAILY
Qty: 90 TABLET | Refills: 1 | Status: SHIPPED | OUTPATIENT
Start: 2022-06-08 | End: 2022-06-14

## 2022-06-08 RX ORDER — ALBUTEROL SULFATE 90 UG/1
AEROSOL, METERED RESPIRATORY (INHALATION)
COMMUNITY
End: 2022-09-13 | Stop reason: CLARIF

## 2022-06-08 RX ORDER — AZELASTINE 1 MG/ML
SPRAY, METERED NASAL
COMMUNITY
Start: 2021-12-08

## 2022-06-08 NOTE — PROGRESS NOTES
Dermatology Patient Note  Abdiel  [address]  [facility]  Dept: [phone]  Dept Fax: [phone]      VISITDATE: 6/8/2022   REFERRING PROVIDER: No ref. provider found      Graeme Olivera. Corine Garcia is a 46 y.o. female  who presents today in the office for:    Follow-up (folliculitis feels like its getting worse on pt's chest) and Other (fbsc- no new concerns )      HISTORY OF PRESENT ILLNESS:  As above. HS on breasts x years. Feels that the process has a cyclical nature to it. MEDICAL PROBLEMS:  Patient Active Problem List    Diagnosis Date Noted    Migraine without aura and without status migrainosus, not intractable 12/07/2021    Liver hemangioma 07/07/2020    Abnormal MRI 07/02/2020    Hepatic flexure mass 06/16/2020    BRYANT BS, Cytologic Washings, STEVAN & Cystoscopy 2/17/20 02/17/2020    At high risk for breast cancer 02/17/2020     Her lifetime risk for breast cancer is approximately 20% according to the [employer] risk model. At this level of risk, she may consider the NCCN guidelines outlined above, including annual breast MRI screening staggered 6 months apart from annual screening mammogram.= starting between 30-40yrs old. 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 There does appear to be some thickening of the gallbladder wall.  In the   proper clinical setting, acalculous cholecystitis could give this appearance.    If indicated, a nuclear medicine hepatobiliary study may be helpful for   further evaluation           Vitamin D deficiency 10/17/2016    Hyperlipidemia 09/15/2015    Abnormal TSH 06/16/2015    Asthma 05/05/2015    Murmur 05/05/2015    Seasonal allergies 05/05/2015       CURRENT MEDICATIONS:   Current Outpatient Medications   Medication Sig Dispense Refill    omeprazole (PRILOSEC OTC) 20 MG tablet 1 capsule      Peak Flow Meter JUSTIN as directed      azelastine (ASTELIN) 0.1 % nasal spray 1-2  puff in each nostril      tiZANidine (ZANAFLEX) 4 MG capsule 1 tablet as needed      Spacer/Aero-Holding Chambers JUSTIN as directed      Diclofenac Sodium 1 % CREA APPLY 2 TO 3 GRAMS 3 TO 4 TIMES DAILY ON BACK AS DIRECTED.  albuterol sulfate HFA (PROVENTIL;VENTOLIN;PROAIR) 108 (90 Base) MCG/ACT inhaler inhale 2 puffs by mouth and INTO THE LUNGS every 4 hours if needed      spironolactone (ALDACTONE) 50 MG tablet Take 1 tablet by mouth daily 90 tablet 1    Cholecalciferol (RA VITAMIN D-3) 50 MCG (2000 UT) CAPS take 1 capsule by mouth once daily 30 capsule 5    ondansetron (ZOFRAN) 4 MG tablet take 1 tablet by mouth every 8 hours if needed for NAUSEA OR VOMITING. 10 tablet 3    ibuprofen (ADVIL;MOTRIN) 800 MG tablet Take 1 tablet by mouth every 8 hours as needed for Pain 120 tablet 0    diclofenac sodium (VOLTAREN) 1 % GEL apply 2-3 grams three to four times a day ON back as directed      omeprazole (PRILOSEC) 20 MG delayed release capsule Take 1 capsule by mouth Daily 90 capsule 1    atorvastatin (LIPITOR) 20 MG tablet take 1 tablet by mouth once daily 90 tablet 1    benzoyl peroxide 5 % external liquid Wash face, chest and back 1-2 times daily 227 g 3    ketoconazole (NIZORAL) 2 % shampoo Use as body wash leaving on for 5 minutes prior to washing off once daily for 2 weeks then three times weekly 120 mL 3    Ubrogepant 100 MG TABS Take one at onset of migraine. May repeat in 2 hours.   No more than 2 in 24 hours and 4 in 1 week 10 tablet 5    loratadine (CLARITIN) 10 MG tablet take 1 tablet by mouth once daily 30 tablet 11    albuterol sulfate  (90 Base) MCG/ACT inhaler Inhale 2 puffs into the lungs every 6 hours as needed for Wheezing 18 g 3    Aspirin-Acetaminophen-Caffeine (EXCEDRIN EXTRA STRENGTH PO) Take by mouth as needed       Acetaminophen (TYLENOL EXTRA STRENGTH PO) Take 2 tablets by mouth as needed      tiZANidine (ZANAFLEX) 4 MG tablet Take 4 mg by mouth every 8 hours as needed      lidocaine (XYLOCAINE) 5 % ointment Apply topically as needed. 1 Tube 0     No current facility-administered medications for this visit.        ALLERGIES:   Allergies   Allergen Reactions    Bee Venom Anaphylaxis    Dexamethasone Shortness Of Breath     Hives and hot flashes    Other reaction(s): Unknown  Other reaction(s): anaphylaxis    Prednisone      CAUSES HEADACHE, RASH, hard to breathe  Other reaction(s): hives, Rash, Rash    Sumatriptan Hives and Shortness Of Breath     Other reaction(s): Unknown  Chest pain      Benzocaine Rash     Mouth numbing medicine- gums and lips swell  Other reaction(s): Unknown    Cyclopentasiloxane Itching and Swelling    Dimethicone Itching and Swelling    Dimethiconol Itching and Swelling    Food Hives     Oysters, clams, mussels, scallops--mollusks    Silicone      Swell up--lubricant    Sulfamethoxazole-Trimethoprim Hives     Bactrim- hives  Other reaction(s): Unknown  Other reaction(s): hives    Tetanus Toxoids      Hives, arm swells up    Influenza Vaccines      Other reaction(s): shortness of breath    Influenza Virus Vaccine     [facility] Clam ([facility]) Skin Test     Other      Other reaction(s): Unknown  Other reaction(s): hives  Other reaction(s): large local swells  Other reaction(s): mouth swelling  Other reaction(s): local swelling and hives    Shellfish Allergy     Sulfamethoxazole Hives     Other reaction(s): Hives    Tetanus Immune Globulin      Other reaction(s): local swelling and shortness of breath    Trimethoprim      Other reaction(s): Hives, Hives    Bactrim [Sulfamethoxazole-Trimethoprim] Hives    Cortisone      HIVES    Diphenhydramine Rash    Seasonal      Hayfever        SOCIAL HISTORY:  Social History     Tobacco Use    Smoking status: Former Smoker     Packs/day: 0.25     Types: Cigarettes     Start date: 1/1/1996     Quit date: 10/1/2006     Years since quitting: 15.6    Smokeless tobacco: Never Used    Tobacco comment: half pack a month quit 2006   Substance Use Topics    Alcohol use: Yes     Comment: social        Pertinent ROS:  Review of Systems  Skin: Denies any new changing, growing or bleeding lesions or rashes except as described in the HPI   Constitutional: Denies fevers, chills, and malaise. PHYSICAL EXAM:   /67   Pulse 85   Temp 98.4 °F (36.9 °C) (Temporal)   Ht 5' 2\" (1.575 m)   Wt 200 lb (90.7 kg)   LMP 01/09/2020   SpO2 95%   Breastfeeding No   BMI 36.58 kg/m²     The patient is generally well appearing, well nourished, alert and conversational. Affect is normal.    Cutaneous Exam:  Physical Exam  Sun-exposed skin: head/face, neck, both arms, digits and nails were examined. Facial covering was not removed during examination. Diagnoses/exam findings/medical history pertinent to this visit are listed below:    Assessment:   Diagnosis Orders   1. Hidradenitis suppurativa  spironolactone (ALDACTONE) 50 MG tablet   2. Seborrheic dermatitis          Plan:  1. Hidradenitis suppurativa  Discussed spironolactone with patient. The patient was informed of common side effects such as increased urination/urinary frequency, menstrual irregularities with mid-cycle spotting, breast tenderness, decreased libido, fatigue, headache, and dizziness. Patient informed to stop taking medication and to immediately report any new onset muscle cramps or weakness, or palpitations. Patient informed that pregnancy needs to be avoided while on this medication.  Discussed risk of elevated potassium and the need to stay away from potassium supplements while on the medication. - spironolactone (ALDACTONE) 50 MG tablet; Take 1 tablet by mouth daily  Dispense: 90 tablet; Refill: 1    2. Seborrheic dermatitis  - chronic illness, responding to treatment but not yet at goal  - Continue Ketoconazole shampoo qod  RTC 3M    Future Appointments   Date Time Provider Skylar Foster   8/25/2022  8:00 AM Meg Olivia [place] [facility]   9/8/2022  1:15 PM Eric Chew PA-C  derm [facility]   9/13/2022  9:30 AM MD Jojo Sorenson [facility]   12/7/2022  8:00 AM FRANDY Truong - CNP Neuro Spec [facility]         There are no Patient Instructions on file for this visit.       Electronically signed by Eric Chew PA-C on 6/8/22 at 4:04 PM EDT

## 2022-06-09 DIAGNOSIS — Z91.89 AT HIGH RISK FOR BREAST CANCER: Primary | ICD-10-CM

## 2022-06-13 ENCOUNTER — TELEPHONE (OUTPATIENT)
Dept: DERMATOLOGY | Age: 42
End: 2022-06-13

## 2022-06-13 DIAGNOSIS — L73.9 CHRONIC FOLLICULITIS: Primary | ICD-10-CM

## 2022-06-13 NOTE — TELEPHONE ENCOUNTER
Pt has Low BP already and was given Spirolactone. She states that she does not want to chance taking it due to her already low BP. Any other suggestions.

## 2022-06-14 RX ORDER — CLINDAMYCIN PHOSPHATE 10 UG/ML
LOTION TOPICAL
Qty: 60 ML | Refills: 3 | Status: SHIPPED | OUTPATIENT
Start: 2022-06-14 | End: 2022-09-08 | Stop reason: ALTCHOICE

## 2022-06-22 ENCOUNTER — HOSPITAL ENCOUNTER (OUTPATIENT)
Dept: MRI IMAGING | Age: 42
Discharge: HOME | End: 2022-06-24
Payer: MEDICARE

## 2022-06-22 DIAGNOSIS — Z91.89 AT HIGH RISK FOR BREAST CANCER: ICD-10-CM

## 2022-06-22 PROCEDURE — 2580000003 HC RX 258: Performed by: STUDENT IN AN ORGANIZED HEALTH CARE EDUCATION/TRAINING PROGRAM

## 2022-06-22 PROCEDURE — A9579 GAD-BASE MR CONTRAST NOS,1ML: HCPCS | Performed by: STUDENT IN AN ORGANIZED HEALTH CARE EDUCATION/TRAINING PROGRAM

## 2022-06-22 PROCEDURE — 6360000004 HC RX CONTRAST MEDICATION: Performed by: STUDENT IN AN ORGANIZED HEALTH CARE EDUCATION/TRAINING PROGRAM

## 2022-06-22 PROCEDURE — C8908 MRI W/O FOL W/CONT, BREAST,: HCPCS

## 2022-06-22 RX ORDER — SODIUM CHLORIDE 0.9 % (FLUSH) 0.9 %
10 SYRINGE (ML) INJECTION ONCE
Status: COMPLETED | OUTPATIENT
Start: 2022-06-22 | End: 2022-06-22

## 2022-06-22 RX ORDER — 0.9 % SODIUM CHLORIDE 0.9 %
100 INTRAVENOUS SOLUTION INTRAVENOUS ONCE
Status: COMPLETED | OUTPATIENT
Start: 2022-06-22 | End: 2022-06-22

## 2022-06-22 RX ADMIN — GADOTERIDOL 18 ML: 279.3 INJECTION, SOLUTION INTRAVENOUS at 09:16

## 2022-06-22 RX ADMIN — SODIUM CHLORIDE 60 ML: 9 INJECTION, SOLUTION INTRAVENOUS at 09:17

## 2022-06-22 RX ADMIN — SODIUM CHLORIDE, PRESERVATIVE FREE 10 ML: 5 INJECTION INTRAVENOUS at 09:17

## 2022-07-15 RX ORDER — ATORVASTATIN CALCIUM 20 MG/1
TABLET, FILM COATED ORAL
Qty: 90 TABLET | Refills: 1 | Status: SHIPPED | OUTPATIENT
Start: 2022-07-15

## 2022-07-15 RX ORDER — ATORVASTATIN CALCIUM 20 MG/1
TABLET, FILM COATED ORAL
Qty: 90 TABLET | Refills: 1 | OUTPATIENT
Start: 2022-07-15

## 2022-07-15 NOTE — TELEPHONE ENCOUNTER
Last Visit:  3/15/2022     Next Visit Date:  Future Appointments   Date Time Provider Skylar Kristin   8/25/2022  8:00 AM Sis Schofield [place] [facility]   9/8/2022  1:15 PM Brenda Lema PA-C  derm [facility]   9/13/2022  9:30 AM Emmanuelle Eye, MD [facility]   12/7/2022  8:00 AM FRANDY Pollack - CNP Neuro [facility] Maintenance   Topic Date Due    COVID-19 Vaccine (1) Never done    Breast cancer screen  12/27/2022    Lipids  03/07/2023    Depression Screen  03/15/2023    Diabetes screen  03/07/2025    Pneumococcal 0-64 years Vaccine (3 - PPSV23 or PCV20) 11/14/2047    Hepatitis C screen  Completed    HIV screen  Completed    Hepatitis A vaccine  Aged Out    Hepatitis B vaccine  Aged Out    Hib vaccine  Aged Out    Meningococcal (ACWY) vaccine  Aged Out    Varicella vaccine  Discontinued       Hemoglobin A1C (%)   Date Value   02/12/2018 5.0             ( goal A1C is < 7)   No results found for: LABMICR  LDL Cholesterol (mg/dL)   Date Value   03/07/2022 69   03/29/2021 59       (goal LDL is <100)   AST (U/L)   Date Value   03/07/2022 15     ALT (U/L)   Date Value   03/07/2022 15     BUN (mg/dL)   Date Value   03/07/2022 14     BP Readings from Last 3 Encounters:   06/08/22 101/67   03/15/22 127/83   02/25/22 114/74          (goal 120/80)    All Future Testing planned in [facility]  Lab Frequency Next Occurrence   PAP SMEAR Once 08/20/2021               Patient Active Problem List:     Asthma     Murmur     Seasonal allergies     Abnormal TSH     Hyperlipidemia     Vitamin D deficiency     Abnormal ultrasound of gallbladder     Colitis     BMI 35.0-35.9,adult     S/P laparoscopic cholecystectomy     Adenocarcinoma in situ (AIS) of uterine cervix     Mild mitral regurgitation     Mild left atrial enlargement     RALH BS, Cytologic Washings, STEVAN & Cystoscopy 2/17/20     At high risk for breast cancer     Hepatic flexure mass     Abnormal MRI     Liver hemangioma     Migraine without aura and without status migrainosus, not intractable

## 2022-07-15 NOTE — TELEPHONE ENCOUNTER
Last Visit:  3/15/2022     Next Visit Date:  Future Appointments   Date Time Provider Skylar Kristin   8/25/2022  8:00 AM Saranya Kraft [place] [facility] Julianna Corrales   9/8/2022  1:15 PM Chino Nguyen PA-C  derm [facility]   9/13/2022  9:30 AM MD Jojo Hylton [facility]   12/7/2022  8:00 AM FRANDY Zhang CNP Neuro [facility] Maintenance   Topic Date Due    COVID-19 Vaccine (1) Never done    Breast cancer screen  12/27/2022    Lipids  03/07/2023    Depression Screen  03/15/2023    Diabetes screen  03/07/2025    Pneumococcal 0-64 years Vaccine (3 - PPSV23 or PCV20) 11/14/2047    Hepatitis C screen  Completed    HIV screen  Completed    Hepatitis A vaccine  Aged Out    Hepatitis B vaccine  Aged Out    Hib vaccine  Aged Out    Meningococcal (ACWY) vaccine  Aged Out    Varicella vaccine  Discontinued       Hemoglobin A1C (%)   Date Value   02/12/2018 5.0             ( goal A1C is < 7)   No results found for: LABMICR  LDL Cholesterol (mg/dL)   Date Value   03/07/2022 69   03/29/2021 59       (goal LDL is <100)   AST (U/L)   Date Value   03/07/2022 15     ALT (U/L)   Date Value   03/07/2022 15     BUN (mg/dL)   Date Value   03/07/2022 14     BP Readings from Last 3 Encounters:   06/08/22 101/67   03/15/22 127/83   02/25/22 114/74          (goal 120/80)    All Future Testing planned in [facility]  Lab Frequency Next Occurrence   PAP SMEAR Once 08/20/2021               Patient Active Problem List:     Asthma     Murmur     Seasonal allergies     Abnormal TSH     Hyperlipidemia     Vitamin D deficiency     Abnormal ultrasound of gallbladder     Colitis     BMI 35.0-35.9,adult     S/P laparoscopic cholecystectomy     Adenocarcinoma in situ (AIS) of uterine cervix     Mild mitral regurgitation     Mild left atrial enlargement     RALH BS, Cytologic Washings, STEVAN & Cystoscopy 2/17/20     At high risk for breast cancer     Hepatic flexure mass     Abnormal MRI     Liver hemangioma     Migraine without aura and without status migrainosus, not intractable

## 2022-08-16 DIAGNOSIS — K21.9 GASTROESOPHAGEAL REFLUX DISEASE WITHOUT ESOPHAGITIS: ICD-10-CM

## 2022-08-16 RX ORDER — OMEPRAZOLE 20 MG/1
CAPSULE, DELAYED RELEASE ORAL
Qty: 30 CAPSULE | Refills: 5 | Status: SHIPPED | OUTPATIENT
Start: 2022-08-16

## 2022-08-16 RX ORDER — IBUPROFEN 800 MG/1
800 TABLET ORAL EVERY 8 HOURS PRN
Qty: 120 TABLET | Refills: 0 | Status: SHIPPED | OUTPATIENT
Start: 2022-08-16

## 2022-08-16 NOTE — TELEPHONE ENCOUNTER
Marla Pierce.  Carissa Sky is calling to request a refill on the following medication(s):    Medication Request:  Requested Prescriptions     Pending Prescriptions Disp Refills    omeprazole (PRILOSEC) 20 MG delayed release capsule [Pharmacy Med Name: OMEPRAZOLE DR 20 MG CAPSULE] 30 capsule      Sig: take 1 capsule by mouth once daily    ibuprofen (ADVIL;MOTRIN) 800 MG tablet [Pharmacy Med Name: IBUPROFEN 800 MG TABLET] 120 tablet 0     Sig: take 1 tablet by mouth every 8 hours AS NEEDED FOR PAIN       Last Visit Date (If Applicable):  1/95/1716    Next Visit Date:    9/13/2022

## 2022-08-25 ENCOUNTER — HOSPITAL ENCOUNTER (OUTPATIENT)
Age: 42
Setting detail: SPECIMEN
Discharge: HOME | End: 2022-08-25

## 2022-08-25 ENCOUNTER — OFFICE VISIT (OUTPATIENT)
Dept: GYNECOLOGIC ONCOLOGY | Age: 42
End: 2022-08-25
Payer: MEDICARE

## 2022-08-25 VITALS
WEIGHT: 201 LBS | BODY MASS INDEX: 36.99 KG/M2 | DIASTOLIC BLOOD PRESSURE: 85 MMHG | OXYGEN SATURATION: 98 % | TEMPERATURE: 98.1 F | HEIGHT: 62 IN | HEART RATE: 80 BPM | SYSTOLIC BLOOD PRESSURE: 124 MMHG

## 2022-08-25 DIAGNOSIS — D06.9 ADENOCARCINOMA IN SITU (AIS) OF UTERINE CERVIX: Primary | ICD-10-CM

## 2022-08-25 DIAGNOSIS — Z12.89 ENCOUNTER FOR PELVIC SCREENING FOR CANCER: ICD-10-CM

## 2022-08-25 PROCEDURE — 99214 OFFICE O/P EST MOD 30 MIN: CPT | Performed by: PHYSICIAN ASSISTANT

## 2022-08-25 PROCEDURE — G8427 DOCREV CUR MEDS BY ELIG CLIN: HCPCS | Performed by: PHYSICIAN ASSISTANT

## 2022-08-25 PROCEDURE — G8417 CALC BMI ABV UP PARAM F/U: HCPCS | Performed by: PHYSICIAN ASSISTANT

## 2022-08-25 PROCEDURE — 1036F TOBACCO NON-USER: CPT | Performed by: PHYSICIAN ASSISTANT

## 2022-08-25 ASSESSMENT — ENCOUNTER SYMPTOMS
BACK PAIN: 1
GASTROINTESTINAL NEGATIVE: 1
RESPIRATORY NEGATIVE: 1

## 2022-08-25 NOTE — PROGRESS NOTES
Review of Systems   Constitutional: Negative. HENT:   Negative for lump/mass. Respiratory: Negative. Cardiovascular: Negative. Gastrointestinal: Negative. Endocrine: Positive for hot flashes. Genitourinary:  Negative for bladder incontinence, difficulty urinating, frequency, menstrual problem, pelvic pain, vaginal bleeding and vaginal discharge. Musculoskeletal:  Positive for back pain (sharp, constant). Negative for flank pain, neck pain and neck stiffness. Skin: Negative. Hematological:  Bruises/bleeds easily.

## 2022-08-25 NOTE — PROGRESS NOTES
[address] [facility], [facility], [address] [facility] [address] Hunter Rincon present for her adenocarcinoma in situ of the cervix surveillance visit. CC/HPI: She is a  AB3 female has a history of adenocarcinoma in situ of the endocervix and has undergone a robotic laparoscopic abdominal hysterectomy, bilateral salpingectomies, peritoneal washings for cytology, cystoscopy and biopsy of vesicouterine peritoneum on 2020. The patient initially was following with her OB/GYN for her annual wellness care. A Pap smear was obtained on 2019 which revealed ASCUS, HPV 16+. She later returned for colposcopy on 2019 and endocervical curettings revealed atypical endocervical glandular epithelium consistent with adenocarcinoma in situ. Two cervical biopsies were taken which also revealed adenocarcinoma in situ. She was then referred to [facility] gynecologic oncology for further evaluation and treatment. Various forms of treatment options were discussed regarding her diagnosis, and the patient had decided that she would like to proceed with a hysterectomy. Tumor markers were obtained preoperatively which revealed a normal limit of CA-125 and CEA on 2020    Final pathology revealed cervix with focal residual adenocarcinoma in situ. No evidence of invasive carcinoma, benign surgical margins. The uterus was also benign and there is no myometrial or serosal lesions identified. Bilateral fallopian tubes were also benign. A bladder flap peritoneal biopsy noted focal endometriosis. Pelvic washings were negative for malignancy. Bilateral ovaries appeared normal and were therefore left in situ. She has followed on surveillance and done well. Recommendations by ASCCP guidelines for management include HPV testing annually for 3 years then test at 3 year intervals for at least 25 years.     Her most recent Pap smear on 2021 was negative for intraepithelial lesion or malignancy. HPV negative. She is due for repeat pap smear today with HPV. Today, she is doing well. She battles her chronic IBS and back pain. She recently saw her PCP Dr. Theodora Chew. She is also established with dermatology for hidradenitis. She uses a topical wash for treatment. She has no abdominal or pelvic pain. She has no vaginal bleeding or discharge. Normal urination habits. No changes to bowel habits. She is on high risk breast screening program, alternating MRI breast and mammogram every 6 months. Most recent MRI breast May 2022 was negative for malignancy. She has a likely hemangioma of the extra mammary region dating back to July 2020. ROS:  I have personally reviewed and agree with the review of systems done by my ancillary staff in the [facility] documentation. Physical Exam:    Vitals:    08/25/22 0800   BP: 124/85   Site: Left Upper Arm   Position: Sitting   Pulse: 80   Temp: 98.1 °F (36.7 °C)   TempSrc: Oral   SpO2: 98%   Weight: 201 lb (91.2 kg)   Height: 5' 2\" (1.575 m)     General: well- appearing, no acute distress, alert and oriented x 3    HEENT: Thyroid normal size. No cervical or supraclavicular lymphadenopathy. Lungs: Clear to auscultate bilaterally to the bases    No CVA tenderness present bilaterally. Heart: Auscultation reveals regular rate and rhythm. No murmurs or gallops appreciated. Abdomen: Obese, soft. Multiple laparoscopic incision scars present. There are no herniations identified throughout. There are no masses. No detectable organomegaly. No inguinal lymphadenopathy bilaterally. Extremities: No edema, calf tenderness or deformities bilaterally. Pelvic: The patient has normal female external genitalia including, vulva, urethra, vagina, perineum, Bartholin glands. Uterus, bilateral fallopian tubes, and cervix are surgically absent. Speculum examination using a medium size speculum reveals no blood in vaginal vault.  The vaginal cuff well-intact with no signs of erythema, induration, separation, or granulation tissue. Bimanual examination: Bladder is non-tender, without mass. There are no palpable vaginal nodules and no other pelvic masses, tenderness or pathology noted. Assessment/Plan:  Cancer Staging  Adenocarcinoma in situ (AIS) of uterine cervix  Staging form: Cervix Uteri, AJCC 8th Edition  - Clinical stage from 2/20/2020: ycT0, cM0 - Signed by Micha Burks MD on 2/20/2020  Staging comments: This was a small area of adenocarcinoma in situ. Cone biopsy revealed no residual disease. All margins clear. Pap smear was positive for P 16 high risk HPV  - Pathologic stage from 2/20/2020: Stage Unknown (ypT0, pNX, cM0) - Signed by Micha Burks MD on 2/20/2020  Staging comments: Patient had an adenocarcinoma in situ. Subsequent cold knife cone biopsy revealed no residual disease. All margins negative. Impression: Hx adenocarcinoma in situ of cervix s/p YUMIKO/bilateral salpingectomy and staging biopsies February 2020. Focal residual adenocarcinoma in situ in final specimen, margins negative. No evidence of invasive disease. Most recent pap smear August 2021 negative, HPV negative. Repeat pap and HPV taken today. Her Surveillance plan is as follows:   1. Return to clinic in 6 months for follow up exam. Discussed plan for clinical breast exam at next visit. 2. She is to call or return to clinic sooner with any questions or concerns    I spent 30 minutes providing care to the patient, counseling and coordinating care, discussing the patient's current situation, reviewing her options, counseling and education her and answering her questions. The patient's pertinent images, labs, and previous records and procedures were reviewed.     Electronically signed by Jamel Bell PA-C on 8/25/2022 at 825 AM EDT

## 2022-08-30 LAB
CYTOLOGY REPORT: NORMAL
HPV SAMPLE: NORMAL
HPV, GENOTYPE 16: NOT DETECTED
HPV, GENOTYPE 18: NOT DETECTED
HPV, HIGH RISK OTHER: NOT DETECTED
HPV, INTERPRETATION: NORMAL
SPECIMEN DESCRIPTION: NORMAL

## 2022-09-08 ENCOUNTER — OFFICE VISIT (OUTPATIENT)
Dept: DERMATOLOGY | Age: 42
End: 2022-09-08
Payer: MEDICARE

## 2022-09-08 VITALS
BODY MASS INDEX: 36.8 KG/M2 | DIASTOLIC BLOOD PRESSURE: 66 MMHG | WEIGHT: 200 LBS | OXYGEN SATURATION: 96 % | HEIGHT: 62 IN | SYSTOLIC BLOOD PRESSURE: 103 MMHG | TEMPERATURE: 92.4 F | HEART RATE: 77 BPM

## 2022-09-08 DIAGNOSIS — L73.2 HIDRADENITIS SUPPURATIVA: Primary | ICD-10-CM

## 2022-09-08 PROCEDURE — G8417 CALC BMI ABV UP PARAM F/U: HCPCS | Performed by: PHYSICIAN ASSISTANT

## 2022-09-08 PROCEDURE — 99213 OFFICE O/P EST LOW 20 MIN: CPT | Performed by: PHYSICIAN ASSISTANT

## 2022-09-08 PROCEDURE — 1036F TOBACCO NON-USER: CPT | Performed by: PHYSICIAN ASSISTANT

## 2022-09-08 PROCEDURE — G8427 DOCREV CUR MEDS BY ELIG CLIN: HCPCS | Performed by: PHYSICIAN ASSISTANT

## 2022-09-08 RX ORDER — MINOCYCLINE 40 MG/G
AEROSOL, FOAM TOPICAL
Qty: 30 G | Refills: 3 | Status: SHIPPED | OUTPATIENT
Start: 2022-09-08 | End: 2022-09-08

## 2022-09-08 RX ORDER — MINOCYCLINE 40 MG/G
AEROSOL, FOAM TOPICAL
Qty: 30 G | Refills: 3 | Status: SHIPPED | OUTPATIENT
Start: 2022-09-08

## 2022-09-08 NOTE — PROGRESS NOTES
acalculous cholecystitis could give this appearance. If indicated, a nuclear medicine hepatobiliary study may be helpful for   further evaluation           Vitamin D deficiency 10/17/2016    Hyperlipidemia 09/15/2015    Abnormal TSH 06/16/2015    Asthma 05/05/2015    Murmur 05/05/2015    Seasonal allergies 05/05/2015       CURRENT MEDICATIONS:   Current Outpatient Medications   Medication Sig Dispense Refill    Minocycline HCl Micronized (AMZEEQ) 4 % FOAM Apply to breasts daily. 30 g 3    omeprazole (PRILOSEC) 20 MG delayed release capsule take 1 capsule by mouth once daily 30 capsule 5    ibuprofen (ADVIL;MOTRIN) 800 MG tablet take 1 tablet by mouth every 8 hours AS NEEDED FOR PAIN 120 tablet 0    atorvastatin (LIPITOR) 20 MG tablet take 1 tablet by mouth once daily 90 tablet 1    benzoyl peroxide 5 % external liquid Wash face, chest and back 1-2 times daily 227 g 3    omeprazole (PRILOSEC OTC) 20 MG tablet 1 capsule      Peak Flow Meter JUSTIN as directed      azelastine (ASTELIN) 0.1 % nasal spray 1-2  puff in each nostril      tiZANidine (ZANAFLEX) 4 MG capsule 1 tablet as needed      Spacer/Aero-Holding Chambers JUSTIN as directed      Diclofenac Sodium 1 % CREA APPLY 2 TO 3 GRAMS 3 TO 4 TIMES DAILY ON BACK AS DIRECTED.      albuterol sulfate HFA (PROVENTIL;VENTOLIN;PROAIR) 108 (90 Base) MCG/ACT inhaler inhale 2 puffs by mouth and INTO THE LUNGS every 4 hours if needed      Cholecalciferol (RA VITAMIN D-3) 50 MCG (2000 UT) CAPS take 1 capsule by mouth once daily 30 capsule 5    ondansetron (ZOFRAN) 4 MG tablet take 1 tablet by mouth every 8 hours if needed for NAUSEA OR VOMITING. 10 tablet 3    diclofenac sodium (VOLTAREN) 1 % GEL apply 2-3 grams three to four times a day ON back as directed      ketoconazole (NIZORAL) 2 % shampoo Use as body wash leaving on for 5 minutes prior to washing off once daily for 2 weeks then three times weekly 120 mL 3    Ubrogepant 100 MG TABS Take one at onset of migraine.  May repeat in 2 hours. No more than 2 in 24 hours and 4 in 1 week 10 tablet 5    loratadine (CLARITIN) 10 MG tablet take 1 tablet by mouth once daily 30 tablet 11    albuterol sulfate  (90 Base) MCG/ACT inhaler Inhale 2 puffs into the lungs every 6 hours as needed for Wheezing 18 g 3    Aspirin-Acetaminophen-Caffeine (EXCEDRIN EXTRA STRENGTH PO) Take by mouth as needed       Acetaminophen (TYLENOL EXTRA STRENGTH PO) Take 2 tablets by mouth as needed      tiZANidine (ZANAFLEX) 4 MG tablet Take 4 mg by mouth every 8 hours as needed      lidocaine (XYLOCAINE) 5 % ointment Apply topically as needed. 1 Tube 0     No current facility-administered medications for this visit.        ALLERGIES:   Allergies   Allergen Reactions    Bee Venom Anaphylaxis    Dexamethasone Shortness Of Breath     Hives and hot flashes    Other reaction(s): Unknown  Other reaction(s): anaphylaxis    Imitrex [Sumatriptan] Hives, Shortness Of Breath and Rash     Other reaction(s): Unknown  Chest pain  DO NOT REMOVE    Prednisone      CAUSES HEADACHE, RASH, hard to breathe  Other reaction(s): hives, Rash, Rash    Sulfa Antibiotics Hives, Itching, Rash and Shortness Of Breath    Benzocaine Rash     Mouth numbing medicine- gums and lips swell  Other reaction(s): Unknown    Cyclopentasiloxane Itching and Swelling    Dimethicone Itching and Swelling    Dimethiconol Itching and Swelling    Food Hives     Oysters, clams, mussels, scallops--mollusks    Silicone      Swell up--lubricant    Sulfamethoxazole-Trimethoprim Hives     Bactrim- hives  Other reaction(s): Unknown  Other reaction(s): hives    Tetanus Toxoids      Hives, arm swells up    Influenza Vaccines      Other reaction(s): shortness of breath    Influenza Virus Vaccine     [facility] ([facility]) Skin Test     Other      Other reaction(s): Unknown  Other reaction(s): hives  Other reaction(s): large local swells  Other reaction(s): mouth swelling  Other reaction(s): local swelling and hives    Shellfish Allergy     Sulfamethoxazole Hives     Other reaction(s): Hives    Tetanus Immune Globulin      Other reaction(s): local swelling and shortness of breath    Trimethoprim      Other reaction(s): Hives, Hives    Bactrim [Sulfamethoxazole-Trimethoprim] Hives    Clindamycin Hives, Itching and Rash     Clindamycin gel LOTN 1%    Cortisone      HIVES    Diphenhydramine Rash    Seasonal      Hayfever        SOCIAL HISTORY:  Social History     Tobacco Use    Smoking status: Former     Packs/day: 0.25     Types: Cigarettes     Start date: 1/1/1996     Quit date: 10/1/2006     Years since quitting: 15.9    Smokeless tobacco: Never    Tobacco comments:     half pack a month quit 2006   Substance Use Topics    Alcohol use: Yes     Comment: social        Pertinent ROS:  Review of Systems  Skin: Denies any new changing, growing or bleeding lesions or rashes except as described in the HPI   Constitutional: Denies fevers, chills, and malaise. PHYSICAL EXAM:   /66   Pulse 77   Temp (!) 92.4 °F (33.6 °C)   Ht 5' 2\" (1.575 m)   Wt 200 lb (90.7 kg)   LMP 01/09/2020   SpO2 96%   BMI 36.58 kg/m²     The patient is generally well appearing, well nourished, alert and conversational. Affect is normal.    Cutaneous Exam:  Physical Exam  Focused exam of sun exposed skin and breasts was performed    Facial covering was not removed during examination. Diagnoses/exam findings/medical history pertinent to this visit are listed below:    Assessment:   Diagnosis Orders   1. Hidradenitis suppurativa  Minocycline HCl Micronized (AMZEEQ) 4 % FOAM    DISCONTINUED: Minocycline HCl Micronized (AMZEEQ) 4 % FOAM           Plan:  1. Hidradenitis suppurativa (DAHL STAGE I)  - chronic illness, responding to treatment but not yet at goal   - Pt is opposed to systemic Tx at this time  - Continue BPO wash qd  - Minocycline HCl Micronized (AMZEEQ) 4 % FOAM; Apply to breasts daily.   Dispense: 30 g; Refill: 3      RTC 6M    Future Appointments   Date Time Provider Skylar Kristin   9/13/2022  9:30 AM MD Jojo Rhoades [email or web address]   12/7/2022  8:00 AM FRANDY Morel - CNP Neuro Spec [facility]   2/24/2023  9:00 AM Jason Heller, [place] G[facility] On[facility]   3/8/2023  1:00 PM [facility]         There are no Patient Instructions on file for this visit.       Electronically signed by Kenneth Spence PA-C on 9/8/22 at 4:30 PM EDT

## 2022-09-13 ENCOUNTER — TELEPHONE (OUTPATIENT)
Dept: DERMATOLOGY | Age: 42
End: 2022-09-13

## 2022-09-13 ENCOUNTER — OFFICE VISIT (OUTPATIENT)
Dept: FAMILY MEDICINE CLINIC | Age: 42
End: 2022-09-13
Payer: MEDICARE

## 2022-09-13 VITALS
HEIGHT: 62 IN | HEART RATE: 79 BPM | WEIGHT: 200.3 LBS | BODY MASS INDEX: 36.86 KG/M2 | SYSTOLIC BLOOD PRESSURE: 108 MMHG | DIASTOLIC BLOOD PRESSURE: 71 MMHG | TEMPERATURE: 97.1 F | OXYGEN SATURATION: 99 %

## 2022-09-13 DIAGNOSIS — G89.29 CHRONIC BILATERAL LOW BACK PAIN WITH BILATERAL SCIATICA: ICD-10-CM

## 2022-09-13 DIAGNOSIS — M54.41 CHRONIC BILATERAL LOW BACK PAIN WITH BILATERAL SCIATICA: ICD-10-CM

## 2022-09-13 DIAGNOSIS — G89.29 CHRONIC JOINT PAIN: ICD-10-CM

## 2022-09-13 DIAGNOSIS — E78.5 HYPERLIPIDEMIA, UNSPECIFIED HYPERLIPIDEMIA TYPE: Primary | Chronic | ICD-10-CM

## 2022-09-13 DIAGNOSIS — M25.50 CHRONIC JOINT PAIN: ICD-10-CM

## 2022-09-13 DIAGNOSIS — M54.42 CHRONIC BILATERAL LOW BACK PAIN WITH BILATERAL SCIATICA: ICD-10-CM

## 2022-09-13 PROBLEM — R93.89 ABNORMAL MRI: Status: RESOLVED | Noted: 2020-07-02 | Resolved: 2022-09-13

## 2022-09-13 PROCEDURE — 1036F TOBACCO NON-USER: CPT | Performed by: STUDENT IN AN ORGANIZED HEALTH CARE EDUCATION/TRAINING PROGRAM

## 2022-09-13 PROCEDURE — G8417 CALC BMI ABV UP PARAM F/U: HCPCS | Performed by: STUDENT IN AN ORGANIZED HEALTH CARE EDUCATION/TRAINING PROGRAM

## 2022-09-13 PROCEDURE — 99214 OFFICE O/P EST MOD 30 MIN: CPT | Performed by: STUDENT IN AN ORGANIZED HEALTH CARE EDUCATION/TRAINING PROGRAM

## 2022-09-13 PROCEDURE — G8427 DOCREV CUR MEDS BY ELIG CLIN: HCPCS | Performed by: STUDENT IN AN ORGANIZED HEALTH CARE EDUCATION/TRAINING PROGRAM

## 2022-09-13 ASSESSMENT — ENCOUNTER SYMPTOMS
CONSTIPATION: 0
ABDOMINAL PAIN: 0
SHORTNESS OF BREATH: 0
EYE DISCHARGE: 0
DIARRHEA: 0
SORE THROAT: 0
NAUSEA: 0
WHEEZING: 0
CHEST TIGHTNESS: 0
BACK PAIN: 1
VOMITING: 0
COUGH: 0

## 2022-09-13 NOTE — PROGRESS NOTES
[address] PRIMARY CARE  [address] [place] [address] [address] [facility]  Dept: [phone]  Dept Fax: [phone] [facility] Muas Williamson is a 46 y.o. female who is a Established patient, who presents today for her medical conditions/complaints as noted below:  Chief Complaint   Patient presents with    Asthma    Referral - General     Ortho -         HPI:     She is here today to follow-up on hyperlipidemia and to discuss her chronic back pain. She says that she was following with pain management for her chronic back pain and she was given back injections which made the pain worse so she does not want to follow-up with them anymore. States that physical therapy also made her pain worse. She says she would rather follow-up with orthopedics or rheumatology. She was seeing rheumatology at [facility] few years ago for elevated ESR and CRP but she says that not much work-up was done for her. She says that the rheumatologist tried to prescribe her narcotics and she did not want to take them. She says that she has a family history of fibromyalgia, lupus and multiple sclerosis. She had recent MRIs done at [facility] which show disc disease and reactive disease and she agrees to send the records to office for review. Hemoglobin A1C (%)   Date Value   02/12/2018 5.0             ( goal A1Cis < 7)   No results found for: LABMICR  LDL Cholesterol (mg/dL)   Date Value   03/07/2022 69   03/29/2021 59   01/30/2020 79       (goal LDL is <100)   AST (U/L)   Date Value   03/07/2022 15     ALT (U/L)   Date Value   03/07/2022 15     BUN (mg/dL)   Date Value   03/07/2022 14     BP Readings from Last 3 Encounters:   09/13/22 108/71   09/08/22 103/66   08/25/22 124/85          (goal 120/80)    Past Medical History:   Diagnosis Date    Abnormal MRI 7/2/2020    Abnormal ultrasound of gallbladder 12/11/2017 12/11/2017  No gallstones 2. There does appear to be some thickening of the gallbladder wall.   In the proper clinical setting, acalculous cholecystitis could give this appearance. 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 Oshea March    Petit mal ([facility] [address]     last time 11/2019    Prolonged emergence from general anesthesia     Psoriasis     Pulmonary stenosis     Ringing in ears     SEES AN ENT    Sacroiliac joint dysfunction of both sides 06/18/2019    Sacroiliitis ([facility] [address] 06/18/2019    Symptomatic cholelithiasis     Wears glasses       Past Surgical History:   Procedure Laterality Date    COLONOSCOPY  01/08/2018    COLON, RANDOM BIOPSIES: MILD FOCAL ACTIVE COLITIS, HEMORRHOIDS    COLPOSCOPY  12/20/2019    Dr. Marta Ordonez (CERVIX STATUS UNKNOWN) N/A 02/17/2020    LAPAROSCOPIC XI ROBOTIC TOTAL HYSTERECTOMY, BILATERAL SALPINGECTOMY, CYTOLOGIC WASHINGS, CYSTOSCOPY performed by Tom Nayak MD at [address] [place] [address], TOTAL ABDOMINAL (CERVIX REMOVED)  02/17/2020    ovaries are still present    LEEP  2008    NERVE BLOCK  06/2019    NERVE BLOCK  07/2019    RI COLONOSCOPY W/BIOPSY SINGLE/MULTIPLE N/A 01/08/2018    COLONOSCOPY WITH BIOPSY performed by Camryn Morales MD at [facility] EGD TRANSORAL BIOPSY SINGLE/MULTIPLE N/A 01/08/2018    EGD BIOPSY performed by Camryn Morales MD at [facility] N/A 04/27/2018    [address] performed by Dai Ayala MD at [address] [place] [address]  may 2021    WISDOM TOOTH EXTRACTION  01/08/2016       Family History   Problem Relation Age of Onset    Diabetes Father     Other Mother         blood disorder    Other Maternal Aunt         blood disorder    Cancer Maternal Aunt         breast    Breast Cancer Maternal Aunt     Other Paternal Aunt         chiari malformation type 2     Cancer Paternal Aunt         cervical cancer    Cancer Maternal Grandmother         uterine cancer     Cancer Maternal Grandfather     High Blood Pressure Paternal Grandfather     Heart Disease Paternal Grandfather     Cancer Paternal Grandfather     Other Maternal Aunt         brain aneurysm       Social History     Tobacco Use    Smoking status: Former     Packs/day: 0.25     Types: Cigarettes     Start date: 1/1/1996     Quit date: 10/1/2006     Years since quitting: 15.9    Smokeless tobacco: Never    Tobacco comments:     half pack a month quit 2006   Substance Use Topics    Alcohol use: Yes     Comment: social       Current Outpatient Medications   Medication Sig Dispense Refill    Minocycline HCl Micronized (AMZEEQ) 4 % FOAM Apply to breasts daily. 30 g 3    omeprazole (PRILOSEC) 20 MG delayed release capsule take 1 capsule by mouth once daily 30 capsule 5    ibuprofen (ADVIL;MOTRIN) 800 MG tablet take 1 tablet by mouth every 8 hours AS NEEDED FOR PAIN 120 tablet 0    atorvastatin (LIPITOR) 20 MG tablet take 1 tablet by mouth once daily 90 tablet 1    benzoyl peroxide 5 % external liquid Wash face, chest and back 1-2 times daily 227 g 3    Peak Flow Meter JUSTIN as directed      azelastine (ASTELIN) 0.1 % nasal spray 1-2  puff in each nostril      Spacer/Aero-Holding Chambers JUSTIN as directed      Cholecalciferol (RA VITAMIN D-3) 50 MCG (2000 UT) CAPS take 1 capsule by mouth once daily 30 capsule 5    ondansetron (ZOFRAN) 4 MG tablet take 1 tablet by mouth every 8 hours if needed for NAUSEA OR VOMITING.  10 tablet 3    diclofenac sodium (VOLTAREN) 1 % GEL apply 2-3 grams three to four times a day ON back as directed      ketoconazole (NIZORAL) 2 % shampoo Use as body wash leaving on for 5 minutes prior to washing off once daily for 2 weeks then three times weekly 120 mL 3    Ubrogepant 100 MG TABS Take one at onset of migraine. May repeat in 2 hours. No more than 2 in 24 hours and 4 in 1 week 10 tablet 5    loratadine (CLARITIN) 10 MG tablet take 1 tablet by mouth once daily 30 tablet 11    albuterol sulfate  (90 Base) MCG/ACT inhaler Inhale 2 puffs into the lungs every 6 hours as needed for Wheezing 18 g 3    Aspirin-Acetaminophen-Caffeine (EXCEDRIN EXTRA STRENGTH PO) Take by mouth as needed       Acetaminophen (TYLENOL EXTRA STRENGTH PO) Take 2 tablets by mouth as needed      tiZANidine (ZANAFLEX) 4 MG tablet Take 4 mg by mouth every 8 hours as needed      lidocaine (XYLOCAINE) 5 % ointment Apply topically as needed. 1 Tube 0     No current facility-administered medications for this visit.      Allergies   Allergen Reactions    Bee Venom Anaphylaxis    Dexamethasone Shortness Of Breath     Hives and hot flashes    Other reaction(s): Unknown  Other reaction(s): anaphylaxis    Imitrex [Sumatriptan] Hives, Shortness Of Breath and Rash     Other reaction(s): Unknown  Chest pain  DO NOT REMOVE    Prednisone      CAUSES HEADACHE, RASH, hard to breathe  Other reaction(s): hives, Rash, Rash    Sulfa Antibiotics Hives, Itching, Rash and Shortness Of Breath    Benzocaine Rash     Mouth numbing medicine- gums and lips swell  Other reaction(s): Unknown    Cyclopentasiloxane Itching and Swelling    Dimethicone Itching and Swelling    Dimethiconol Itching and Swelling    Food Hives     Oysters, clams, mussels, scallops--mollusks    Silicone      Swell up--lubricant    Sulfamethoxazole-Trimethoprim Hives     Bactrim- hives  Other reaction(s): Unknown  Other reaction(s): hives    Tetanus Toxoids      Hives, arm swells up    Influenza Vaccines      Other reaction(s): shortness of breath    Influenza Virus Vaccine     [facility] ([facility]) Skin Test     Other      Other reaction(s): Unknown  Other reaction(s): hives  Other reaction(s): large local swells  Other reaction(s): mouth swelling  Other reaction(s): local swelling and hives    Shellfish Allergy     Sulfamethoxazole Hives     Other reaction(s): Hives    Tetanus Immune Globulin      Other reaction(s): local swelling and shortness of breath    Trimethoprim      Other reaction(s): Hives, Hives    Bactrim [Sulfamethoxazole-Trimethoprim] Hives    Clindamycin Hives, Itching and Rash     Clindamycin gel LOTN 1%    Cortisone      HIVES    Diphenhydramine Rash    Seasonal      Hayfever        Health Maintenance   Topic Date Due    COVID-19 Vaccine (1) Never done    Breast cancer screen  12/27/2022    Lipids  03/07/2023    Depression Screen  03/15/2023    Diabetes screen  03/07/2025    Pneumococcal 0-64 years Vaccine (3 - PPSV23 or PCV20) 11/14/2047    Hepatitis C screen  Completed    HIV screen  Completed    Hepatitis A vaccine  Aged Out    Hepatitis B vaccine  Aged Out    Hib vaccine  Aged Out    Meningococcal (ACWY) vaccine  Aged Out    Varicella vaccine  Discontinued       Subjective:     Review of Systems   Constitutional:  Negative for appetite change, fatigue and fever. HENT:  Negative for congestion, ear pain, hearing loss and sore throat. Eyes:  Negative for discharge and visual disturbance. Respiratory:  Negative for cough, chest tightness, shortness of breath and wheezing. Cardiovascular:  Negative for chest pain, palpitations and leg swelling. Gastrointestinal:  Negative for abdominal pain, constipation, diarrhea, nausea and vomiting. Genitourinary:  Negative for flank pain, frequency, hematuria and urgency. Musculoskeletal:  Positive for back pain. Negative for arthralgias, gait problem, joint swelling and myalgias. Skin: Negative. Neurological:  Negative for dizziness, weakness, numbness and headaches. Psychiatric/Behavioral: Negative. Negative for dysphoric mood. The patient is not nervous/anxious. Objective:     Physical Exam  Vitals reviewed. Constitutional:       Appearance: Normal appearance. She is normal weight. HENT:      Head: Normocephalic and atraumatic. Nose: Nose normal.      Mouth/Throat:      Mouth: Mucous membranes are moist.      Pharynx: Oropharynx is clear. Eyes:      Extraocular Movements: Extraocular movements intact. Conjunctiva/sclera: Conjunctivae normal.      Pupils: Pupils are equal, round, and reactive to light. Cardiovascular:      Rate and Rhythm: Normal rate and regular rhythm. Heart sounds: Normal heart sounds. No murmur heard. No gallop. Pulmonary:      Effort: Pulmonary effort is normal. No respiratory distress. Breath sounds: Normal breath sounds. No stridor. No wheezing. Abdominal:      General: Bowel sounds are normal. There is no distension. Palpations: Abdomen is soft. Tenderness: There is no abdominal tenderness. There is no guarding or rebound. Musculoskeletal:         General: No swelling or tenderness. Normal range of motion. Cervical back: Normal range of motion and neck supple. Right lower leg: Edema present. Left lower leg: Edema present. Skin:     General: Skin is warm and dry. Coloration: Skin is not jaundiced. Findings: No rash. Neurological:      General: No focal deficit present. Mental Status: She is alert and oriented to person, place, and time. Psychiatric:         Mood and Affect: Mood normal.         Behavior: Behavior normal.         Thought Content: Thought content normal.         Judgment: Judgment normal.     /71   Pulse 79   Temp 97.1 °F (36.2 °C)   Ht 5' 2\" (1.575 m)   Wt 200 lb 4.8 oz (90.9 kg)   LMP 01/09/2020   SpO2 99%   BMI 36.64 kg/m²     Assessment/Plan:   1. Hyperlipidemia, unspecified hyperlipidemia type  2.  Chronic joint pain  -     AUGUSTO Screen with Reflex; Future  -     C-Reactive Protein; Future  -     Rheumatoid Factor; Future  -     Sedimentation Rate; Future  3. Chronic bilateral low back pain with bilateral sciatica      Hyperlipidemia-on atorvastatin 20 mg daily, last lipid panel normal    Chronic joint pain-previously elevated ESR CRP, repeat labs ordered. Chronic low back pain-discussed getting MRI records from [facility] and considering referral to neurosurgery/orthopedics for degenerative disc disease      Return in about 6 months (around 3/13/2023). Orders Placed This Encounter   Procedures    AUGUSTO Screen with Reflex     Standing Status:   Future     Standing Expiration Date:   3/13/2023    C-Reactive Protein     Standing Status:   Future     Standing Expiration Date:   3/13/2023    Rheumatoid Factor     Standing Status:   Future     Standing Expiration Date:   3/13/2023    Sedimentation Rate     Standing Status:   Future     Standing Expiration Date:   3/13/2023     No orders of the defined types were placed in this encounter. Patient given educational materials - see patient instructions. Discussed use, benefit, and side effects of prescribed medications. All patientquestions answered. Pt voiced understanding. Reviewed health maintenance. Instructedto continue current medications, diet and exercise. Patient agreed with treatmentplan. Follow up as directed.      Electronically signed by Jm Vee MD on 9/13/2022 at 2:20 PM

## 2022-09-13 NOTE — TELEPHONE ENCOUNTER
Please inform pt that her insurance will not cover Amzeeq. It would require failure of 2 other forms of clindamycin, or erythromycin topical.  If the pt would like to try this, we can. I know that she was doing rather well when we saw her though, so she may elect just to continue the BPO wash alone.

## 2022-09-13 NOTE — TELEPHONE ENCOUNTER
Spoke to patient, informed her of the denial. She cannot try the alternatives due to breaking out in hives with clinda, she will pay OOP for the [facility]

## 2022-09-15 ENCOUNTER — HOSPITAL ENCOUNTER (OUTPATIENT)
Age: 42
Setting detail: SPECIMEN
Discharge: HOME | End: 2022-09-15

## 2022-09-15 DIAGNOSIS — M25.50 CHRONIC JOINT PAIN: ICD-10-CM

## 2022-09-15 DIAGNOSIS — G89.29 CHRONIC JOINT PAIN: ICD-10-CM

## 2022-09-15 LAB
C-REACTIVE PROTEIN: 13 MG/L (ref 0–5)
RHEUMATOID FACTOR: <10 IU/ML
SEDIMENTATION RATE, ERYTHROCYTE: 15 MM/HR (ref 0–20)

## 2022-09-16 LAB
ANTI DNA DOUBLE STRANDED: 0.8 IU/ML
ANTI-NUCLEAR ANTIBODY (ANA): NEGATIVE
ENA ANTIBODIES SCREEN: 0.2 U/ML

## 2022-09-16 NOTE — TELEPHONE ENCOUNTER
Pharmacy requesting refill of Zofran 4 mg .       Medication active on med list yes      Date of last Rx: 05/18/22 with 3 refills          verified by ANDERSON CRUZ      Date of last appointment 12/07/21    Next Visit Date:  12/07/22

## 2022-09-19 DIAGNOSIS — R79.82 ELEVATED C-REACTIVE PROTEIN (CRP): Primary | ICD-10-CM

## 2022-09-21 RX ORDER — ONDANSETRON 4 MG/1
TABLET, FILM COATED ORAL
Qty: 10 TABLET | Refills: 2 | Status: SHIPPED | OUTPATIENT
Start: 2022-09-21

## 2022-11-14 RX ORDER — IBUPROFEN 800 MG/1
800 TABLET ORAL EVERY 8 HOURS PRN
Qty: 120 TABLET | Refills: 0 | Status: SHIPPED | OUTPATIENT
Start: 2022-11-14

## 2022-11-14 RX ORDER — GLUCOSAMINE/CHONDR SU A SOD 750-600 MG
TABLET ORAL
Qty: 30 CAPSULE | Refills: 5 | Status: SHIPPED | OUTPATIENT
Start: 2022-11-14

## 2022-11-14 NOTE — TELEPHONE ENCOUNTER
Laure Sellers.  Benji Acharya is calling to request a refill on the following medication(s):    Medication Request:  Requested Prescriptions     Pending Prescriptions Disp Refills    ibuprofen (ADVIL;MOTRIN) 800 MG tablet [Pharmacy Med Name: IBUPROFEN 800 MG TABLET] 120 tablet 0     Sig: take 1 tablet by mouth every 8 hours AS NEEDED FOR PAIN    RA VITAMIN D-3 50 MCG (2000 UT) CAPS [Pharmacy Med Name: RA VITAMIN D3 2,000 UNIT SFGL] 30 capsule 5     Sig: take 1 capsule by mouth once daily       Last Visit Date (If Applicable):  9/66/5395    Next Visit Date:    3/14/2023

## 2022-11-16 ENCOUNTER — TELEPHONE (OUTPATIENT)
Dept: GYNECOLOGIC ONCOLOGY | Age: 42
End: 2022-11-16

## 2022-11-16 DIAGNOSIS — Z12.39 BREAST CANCER SCREENING, HIGH RISK PATIENT: Primary | ICD-10-CM

## 2022-11-16 RX ORDER — LORATADINE 10 MG/1
TABLET ORAL
Qty: 30 TABLET | Refills: 11 | Status: SHIPPED | OUTPATIENT
Start: 2022-11-16

## 2022-12-07 ENCOUNTER — OFFICE VISIT (OUTPATIENT)
Dept: NEUROLOGY | Age: 42
End: 2022-12-07
Payer: MEDICAID

## 2022-12-07 VITALS
HEART RATE: 77 BPM | SYSTOLIC BLOOD PRESSURE: 119 MMHG | DIASTOLIC BLOOD PRESSURE: 77 MMHG | BODY MASS INDEX: 36.25 KG/M2 | WEIGHT: 197 LBS | HEIGHT: 62 IN

## 2022-12-07 DIAGNOSIS — G43.009 MIGRAINE WITHOUT AURA AND WITHOUT STATUS MIGRAINOSUS, NOT INTRACTABLE: Primary | ICD-10-CM

## 2022-12-07 DIAGNOSIS — G44.221 CHRONIC TENSION-TYPE HEADACHE, INTRACTABLE: ICD-10-CM

## 2022-12-07 PROCEDURE — G8417 CALC BMI ABV UP PARAM F/U: HCPCS | Performed by: NURSE PRACTITIONER

## 2022-12-07 PROCEDURE — 99214 OFFICE O/P EST MOD 30 MIN: CPT | Performed by: NURSE PRACTITIONER

## 2022-12-07 PROCEDURE — G8484 FLU IMMUNIZE NO ADMIN: HCPCS | Performed by: NURSE PRACTITIONER

## 2022-12-07 PROCEDURE — G8427 DOCREV CUR MEDS BY ELIG CLIN: HCPCS | Performed by: NURSE PRACTITIONER

## 2022-12-07 PROCEDURE — 1036F TOBACCO NON-USER: CPT | Performed by: NURSE PRACTITIONER

## 2022-12-07 NOTE — PROGRESS NOTES
[facility]            [address]          [place], [address] [facility]          Dept: [phone]          Dept Fax: [phone]    E. Diannia Dandy, MD Ahmed B. Laurance Scarlet, MD Lorretta Gillis, MD Marlow Slot, CNP            12/7/2022      HISTORY OF PRESENT ILLNESS:       I had the pleasure of seeing Gen Ramos. Ginny Ba, who returns for continuing neurologic care. The patient was seen last on December 7, 2021 for treatment of chronic migraine headaches. The patient has chronic migraine headaches and was prescribed imitrex and ubrelvy for abortive therapy. She is here today reporting that her headaches have remained well controlled having 1-3 migraines/month. She has continued using ubrelvy samples and imitrex for abortive therapy which are effective. She has daily headache which is 3-5/10 in intensity with 1-3 migraines/month which are 10/10 in intensity. Headache location: bifrontal and bitemporal   Headache quality: pounding and throbbing  Associated factors:  nausea, Photophobia, Phonophobia  Intensity: 10/10  Headache chronicity: since 2012  Headache frequency: 1-2 headaches/month  Aggravating factors : bright lights, loud sounds, physical activity  Relieving factors: partially by rest and sleep, imitrex  Prophylactic medications:   Abortive medications: oral imitrex, ubrelvy   Previously used medications: topamax, amitriptyline, aimovig injections, injectable imitrex    The patient has a history of aortic sclerosis and congenital heart disease. Testing reviewed:     EMG 9/16/2019: This is a normal electrophysiological study. There is no evidence of large fiber sensorimotor neuropathy. Clinical correlation is recommended.   EMG 12/4/17: No evidence of neuropathy, plexopathy, radiculopathy or myopathy On the right upper and lower extremities  MRI brain without contrast 3/14/2016: No acute intracranial abnormality      PAST MEDICAL HISTORY:         Diagnosis Date    Abnormal MRI 7/2/2020    Abnormal ultrasound of gallbladder 12/11/2017 12/11/2017  No gallstones 2. There does appear to be some thickening of the gallbladder wall. In the proper clinical setting, acalculous cholecystitis could give this appearance. 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 Karyle Bulla Petit mal ([facility]     last time 11/2019    Prolonged emergence from general anesthesia     Psoriasis     Pulmonary stenosis     Ringing in ears     SEES AN ENT    Sacroiliac joint dysfunction of both sides 06/18/2019    Sacroiliitis ([facility] 06/18/2019    Symptomatic cholelithiasis     Wears glasses         PAST SURGICAL HISTORY:         Procedure Laterality Date    COLONOSCOPY  01/08/2018    COLON, RANDOM BIOPSIES: MILD FOCAL ACTIVE COLITIS, HEMORRHOIDS    COLPOSCOPY  12/20/2019    Dr. Dylan Chapa (CERVIX STATUS UNKNOWN) N/A 02/17/2020    LAPAROSCOPIC XI ROBOTIC TOTAL HYSTERECTOMY, BILATERAL SALPINGECTOMY, CYTOLOGIC WASHINGS, CYSTOSCOPY performed by Beltran Ruiz MD at [address] [place] [address], TOTAL ABDOMINAL (CERVIX REMOVED)  02/17/2020    ovaries are still present    LEEP  2008    NERVE BLOCK  06/2019    NERVE BLOCK  07/2019    CO COLONOSCOPY W/BIOPSY SINGLE/MULTIPLE N/A 01/08/2018    COLONOSCOPY WITH BIOPSY performed by Jh Barr MD at [address] [place] [address] EGD TRANSORAL BIOPSY SINGLE/MULTIPLE N/A 01/08/2018    EGD BIOPSY performed by Cipriano Ocampo MD at [address] [facility] [address] N/A 2018    [address] performed by Cody Lawrence MD at [address] [facility]      may 2021    [address] EXTRACTION  2016        SOCIAL HISTORY:     Social History     Socioeconomic History    Marital status:      Spouse name: Not on file    Number of children: Not on file    Years of education: Not on file    Highest education level: Not on file   Occupational History    Not on file   Tobacco Use    Smoking status: Former     Packs/day: 0.25     Types: Cigarettes     Start date: 1996     Quit date: 10/1/2006     Years since quittin.1    Smokeless tobacco: Never    Tobacco comments:     half pack a month quit    Vaping Use    Vaping Use: Never used   Substance and Sexual Activity    Alcohol use: Yes     Comment: social     Drug use: Yes     Types: Marijuana (Weed)     Comment: edible cannabis for pain    Sexual activity: Yes     Partners: Male   Other Topics Concern    Not on file   Social History Narrative    Not on file     Social Determinants of Health     Financial Resource Strain: Not on file   Food Insecurity: Not on file   Transportation Needs: Not on file   Physical Activity: Not on file   Stress: Not on file   Social Connections: Not on file   Intimate Partner Violence: Not on file   Housing Stability: Not on file       CURRENT MEDICATIONS:     Current Outpatient Medications   Medication Sig Dispense Refill    loratadine (CLARITIN) 10 MG tablet take 1 tablet by mouth once daily 30 tablet 11    ibuprofen (ADVIL;MOTRIN) 800 MG tablet take 1 tablet by mouth every 8 hours AS NEEDED FOR PAIN 120 tablet 0    RA VITAMIN D-3 50 MCG ( UT) CAPS take 1 capsule by mouth once daily 30 capsule 5    ondansetron (ZOFRAN) 4 MG tablet take 1 tablet by mouth every 8 hours if needed for NAUSEA OR VOMITING.  10 tablet 2    omeprazole (PRILOSEC) 20 MG delayed release capsule take 1 capsule by mouth once daily 30 capsule 5    atorvastatin (LIPITOR) 20 MG tablet take 1 tablet by mouth once daily 90 tablet 1    benzoyl peroxide 5 % external liquid Wash face, chest and back 1-2 times daily 227 g 3    Peak Flow Meter JUSTIN as directed      azelastine (ASTELIN) 0.1 % nasal spray 1-2  puff in each nostril      Spacer/Aero-Holding Chambers JUSTIN as directed      diclofenac sodium (VOLTAREN) 1 % GEL apply 2-3 grams three to four times a day ON back as directed      ketoconazole (NIZORAL) 2 % shampoo Use as body wash leaving on for 5 minutes prior to washing off once daily for 2 weeks then three times weekly 120 mL 3    Ubrogepant 100 MG TABS Take one at onset of migraine. May repeat in 2 hours. No more than 2 in 24 hours and 4 in 1 week 10 tablet 5    albuterol sulfate  (90 Base) MCG/ACT inhaler Inhale 2 puffs into the lungs every 6 hours as needed for Wheezing 18 g 3    Aspirin-Acetaminophen-Caffeine (EXCEDRIN EXTRA STRENGTH PO) Take by mouth as needed       Acetaminophen (TYLENOL EXTRA STRENGTH PO) Take 2 tablets by mouth as needed      tiZANidine (ZANAFLEX) 4 MG tablet Take 4 mg by mouth every 8 hours as needed      Minocycline HCl Micronized (AMZEEQ) 4 % FOAM Apply to breasts daily. (Patient not taking: Reported on 12/7/2022) 30 g 3    lidocaine (XYLOCAINE) 5 % ointment Apply topically as needed. (Patient not taking: Reported on 12/7/2022) 1 Tube 0     No current facility-administered medications for this visit.         ALLERGIES:     Allergies   Allergen Reactions    Bee Venom Anaphylaxis    Dexamethasone Shortness Of Breath     Hives and hot flashes    Other reaction(s): Unknown  Other reaction(s): anaphylaxis    Imitrex [Sumatriptan] Hives, Shortness Of Breath and Rash     Other reaction(s): Unknown  Chest pain  DO NOT REMOVE    Prednisone      CAUSES HEADACHE, RASH, hard to breathe  Other reaction(s): hives, Rash, Rash    Sulfa Antibiotics Hives, Itching, Rash and Shortness Of Breath    Benzocaine Rash     Mouth numbing medicine- gums and lips swell  Other reaction(s): Unknown    Cyclopentasiloxane Itching and Swelling    Dimethicone Itching and Swelling    Dimethiconol Itching and Swelling    Food Hives     Oysters, clams, mussels, scallops--mollusks    Silicone      Swell up--lubricant    Sulfamethoxazole-Trimethoprim Hives     Bactrim- hives  Other reaction(s): Unknown  Other reaction(s): hives    Tetanus Toxoids      Hives, arm swells up    Influenza Vaccines      Other reaction(s): shortness of breath    Influenza Virus Vaccine     [facility] Clam ([facility]. [facility]en[facility]) Skin Test     Other      Other reaction(s): Unknown  Other reaction(s): hives  Other reaction(s): large local swells  Other reaction(s): mouth swelling  Other reaction(s): local swelling and hives    Shellfish Allergy     Sulfamethoxazole Hives     Other reaction(s): Hives    Tetanus Immune Globulin      Other reaction(s): local swelling and shortness of breath    Trimethoprim      Other reaction(s): Hives, Hives    Bactrim [Sulfamethoxazole-Trimethoprim] Hives    Clindamycin Hives, Itching and Rash     Clindamycin gel LOTN 1%    Cortisone      HIVES    Diphenhydramine Rash    Seasonal      Hayfever                                  REVIEW OF SYSTEMS        All items selected indicate a positive finding. Those items not selected are negative.   Constitutional [] Weight loss/gain   [] Fatigue  [] Fever/Chills   HEENT [] Hearing Loss  [] Visual Disturbance  [] Tinnitus  [] Eye pain   Respiratory [] Shortness of Breath  [] Cough  [] Snoring   Cardiovascular [] Chest Pain  [] Palpitations  [] Lightheaded   GI [] Constipation  [] Diarrhea  [] Swallowing change  [x] Nausea/vomiting    [] Urinary Frequency  [] Urinary Urgency   Musculoskeletal [] Neck pain  [] Back pain  [] Muscle pain  [] Restless legs   Dermatologic [] Skin changes   Neurologic [] Memory loss/confusion  [] Seizures  [] Trouble walking or imbalance  [] Dizziness  [] Sleep disturbance  [] Weakness  [] Numbness  [] Tremors  [] Speech Difficulty  [x] Headaches  [x] Light Sensitivity  [x] Sound Sensitivity   Endocrinology []Excessive thirst  []Excessive hunger   Psychiatric [] Anxiety/Depression  [] Hallucination   Allergy/immunology []Hives/environmental allergies   Hematologic/lymph [] Abnormal bleeding  [] Abnormal bruising         PHYSICAL EXAMINATION:       Vitals:    12/07/22 0752   BP: 119/77   Pulse: 77                                              .                                                                                                    General Appearance:  Alert, cooperative, no signs of distress, appears stated age   Head:  Normocephalic, no signs of trauma   Eyes:  Conjunctiva/corneas clear;  eyelids intact   Ears:  Normal external ear and canals   Nose: Nares normal, mucosa normal, no drainage    Throat: Lips and tongue normal; teeth normal;  gums normal   Neck: Supple, intact flexion, extension and rotation;   trachea midline;  no adenopathy;   thyroid: not enlarged;   no carotid pulse abnormality   Back:   Symmetric, no curvature, ROM adequate   Lungs:   Respirations unlabored   Heart:  Regular rate and rhythm           Extremities: Extremities normal, no cyanosis, no edema   Pulses: Symmetric over head and neck   Skin: Skin color, texture normal, no rashes, no lesions                                     NEUROLOGIC EXAMINATION    Neurologic Exam  Mental status    Alert and oriented x 3; intact memory with no confusion, speech or language problems; no hallucinations or delusions  Fund of information appropriate for level of education    Cranial nerves    II - visual fields intact to confrontation bilaterally  III, IV, VI - extra-ocular muscles full: no pupillary defect; no ROBBIE, no nystagmus, no ptosis   V - normal facial sensation                                                               VII - normal facial symmetry VIII - intact hearing                                                                             IX, X - symmetrical palate                                                                  XI - symmetrical shoulder shrug                                                       XII - tongue midline without atrophy or fasciculation      Motor function  Normal muscle bulk and tone; strength 5/5 on all 4 extremities, no pronator drift      Sensory function Intact to light touch, pinprick, vibration, proprioception on all 4 extremities      Cerebellar Intact fine motor movement. No involuntary movements or tremors. No ataxia or dysmetria on finger to nose or heel to shin testing      Reflex function DTR 2+ on bilateral UE and LE, symmetric. Down going toes bilaterally      Gait                   normal base and arm swing                  Medical Decision Making: In summary, your patient, Madi Yost. Jung Curran exhibits the following, with associated plan:    Chronic migraine headaches without aura with superimposed daily tension headaches, currently getting 1-3 migraines/month. Because of the patients pulmonary and cardiac conditions triptans are relatively contraindicated. Continue oral imitrex  Start ubrelvy for abortive therapy as triptans are contraindicated with a history of aortic sclerosis and congenital heart disease  The patient previously stopped aimovig as she believed her headaches were caused by environmental factors, however I discouraged her from stopping the medication. Return for follow up visit on annual basis            Signed: Erick Werner CNP      *Please note that portions of this note were completed with a voice recognition program.  Although every effort was made to insure the accuracy of this automated transcription, some errors in transcription may have occurred, occasionally words and are mis-transcribed    Provider Attestation:  The documentation recorded by the scribe accurately reflects the service I personally performed and the decisions made by myself. Portions of this note were transcribed by a scribe. I personally performed the history, physical exam, and medical decision-making and confirm the accuracy of the information in the transcribed note. Scribe Attestation:   By signing my name below, Camryn Villegas, attest that this documentation has been prepared under the direction and in the presence of Lorena Graff CNP.

## 2022-12-15 NOTE — TELEPHONE ENCOUNTER
Pharmacy requesting refill of ondansetron (ZOFRAN) 4 MG tablet      Medication active on med list yes      Date of last Rx: 09/21/2022 with 2 refills          verified by ANDERSON DUARTE      Date of last appointment 12/7/2022    Next Visit Date:  Visit date not found

## 2022-12-19 RX ORDER — ONDANSETRON 4 MG/1
TABLET, FILM COATED ORAL
Qty: 10 TABLET | Refills: 2 | Status: SHIPPED | OUTPATIENT
Start: 2022-12-19

## 2022-12-28 ENCOUNTER — TRANSCRIBE ORDERS (OUTPATIENT)
Dept: ADMINISTRATIVE | Age: 42
End: 2022-12-28

## 2022-12-28 ENCOUNTER — HOSPITAL ENCOUNTER (OUTPATIENT)
Dept: MAMMOGRAPHY | Age: 42
Discharge: HOME | End: 2022-12-30
Payer: MEDICAID

## 2022-12-28 DIAGNOSIS — Z12.39 BREAST CANCER SCREENING, HIGH RISK PATIENT: ICD-10-CM

## 2022-12-28 DIAGNOSIS — R92.8 ABNORMAL MAMMOGRAM: Primary | ICD-10-CM

## 2022-12-28 PROCEDURE — 77063 BREAST TOMOSYNTHESIS BI: CPT

## 2022-12-28 NOTE — RESULT ENCOUNTER NOTE
Called and spoke to patient regarding results. Patient voiced understanding and has already scheduled these tests.

## 2023-01-16 RX ORDER — ATORVASTATIN CALCIUM 20 MG/1
TABLET, FILM COATED ORAL
Qty: 90 TABLET | Refills: 1 | Status: SHIPPED | OUTPATIENT
Start: 2023-01-16

## 2023-01-16 NOTE — TELEPHONE ENCOUNTER
Jerson Rodriguez.  Ellis Spurling is calling to request a refill on the following medication(s):    Medication Request:  Requested Prescriptions     Pending Prescriptions Disp Refills    atorvastatin (LIPITOR) 20 MG tablet [Pharmacy Med Name: ATORVASTATIN 20 MG TABLET] 90 tablet 1     Sig: take 1 tablet by mouth once daily       Last Visit Date (If Applicable):  1/34/3820    Next Visit Date:    3/14/2023

## 2023-01-23 ENCOUNTER — HOSPITAL ENCOUNTER (OUTPATIENT)
Dept: MAMMOGRAPHY | Age: 43
Discharge: HOME | End: 2023-01-25
Payer: MEDICAID

## 2023-01-23 ENCOUNTER — HOSPITAL ENCOUNTER (OUTPATIENT)
Dept: ULTRASOUND IMAGING | Age: 43
Discharge: HOME | End: 2023-01-25
Payer: MEDICAID

## 2023-01-23 DIAGNOSIS — R92.8 ABNORMAL MAMMOGRAM: ICD-10-CM

## 2023-01-23 PROCEDURE — G0279 TOMOSYNTHESIS, MAMMO: HCPCS

## 2023-02-13 DIAGNOSIS — K21.9 GASTROESOPHAGEAL REFLUX DISEASE WITHOUT ESOPHAGITIS: ICD-10-CM

## 2023-02-13 DIAGNOSIS — B36.0 TINEA VERSICOLOR: ICD-10-CM

## 2023-02-13 RX ORDER — KETOCONAZOLE 20 MG/ML
SHAMPOO TOPICAL
Qty: 120 ML | Refills: 3 | Status: SHIPPED | OUTPATIENT
Start: 2023-02-13

## 2023-02-13 NOTE — TELEPHONE ENCOUNTER
Juani Carmona is calling to request a refill on the following medication(s):    Medication Request:  Requested Prescriptions     Pending Prescriptions Disp Refills    ibuprofen (ADVIL;MOTRIN) 800 MG tablet [Pharmacy Med Name: IBUPROFEN 800 MG TABLET] 120 tablet 0     Sig: take 1 tablet by mouth every 8 hours AS NEEDED FOR PAIN    omeprazole (PRILOSEC) 20 MG delayed release capsule [Pharmacy Med Name: OMEPRAZOLE DR 20 MG CAPSULE] 30 capsule 5     Sig: take 1 capsule by mouth once daily       Last Visit Date (If Applicable):  5/36/0402    Next Visit Date:    3/14/2023

## 2023-02-14 RX ORDER — OMEPRAZOLE 20 MG/1
CAPSULE, DELAYED RELEASE ORAL
Qty: 30 CAPSULE | Refills: 5 | Status: SHIPPED | OUTPATIENT
Start: 2023-02-14

## 2023-02-14 RX ORDER — IBUPROFEN 800 MG/1
800 TABLET ORAL EVERY 8 HOURS PRN
Qty: 120 TABLET | Refills: 0 | Status: SHIPPED | OUTPATIENT
Start: 2023-02-14

## 2023-02-23 ENCOUNTER — OFFICE VISIT (OUTPATIENT)
Dept: GYNECOLOGIC ONCOLOGY | Age: 43
End: 2023-02-23
Payer: MEDICAID

## 2023-02-23 VITALS
BODY MASS INDEX: 36.36 KG/M2 | TEMPERATURE: 98.4 F | WEIGHT: 197.6 LBS | HEIGHT: 62 IN | HEART RATE: 78 BPM | DIASTOLIC BLOOD PRESSURE: 80 MMHG | SYSTOLIC BLOOD PRESSURE: 127 MMHG | OXYGEN SATURATION: 99 %

## 2023-02-23 DIAGNOSIS — R10.2 PELVIC PAIN: ICD-10-CM

## 2023-02-23 DIAGNOSIS — Z12.39 BREAST CANCER SCREENING, HIGH RISK PATIENT: ICD-10-CM

## 2023-02-23 DIAGNOSIS — D06.9 ADENOCARCINOMA IN SITU (AIS) OF UTERINE CERVIX: Primary | ICD-10-CM

## 2023-02-23 PROCEDURE — 1036F TOBACCO NON-USER: CPT | Performed by: PHYSICIAN ASSISTANT

## 2023-02-23 PROCEDURE — 99215 OFFICE O/P EST HI 40 MIN: CPT | Performed by: PHYSICIAN ASSISTANT

## 2023-02-23 PROCEDURE — G8484 FLU IMMUNIZE NO ADMIN: HCPCS | Performed by: PHYSICIAN ASSISTANT

## 2023-02-23 PROCEDURE — G8427 DOCREV CUR MEDS BY ELIG CLIN: HCPCS | Performed by: PHYSICIAN ASSISTANT

## 2023-02-23 PROCEDURE — G8417 CALC BMI ABV UP PARAM F/U: HCPCS | Performed by: PHYSICIAN ASSISTANT

## 2023-02-23 ASSESSMENT — ENCOUNTER SYMPTOMS
BACK PAIN: 1
EYES NEGATIVE: 1
GASTROINTESTINAL NEGATIVE: 1
RESPIRATORY NEGATIVE: 1

## 2023-02-23 NOTE — PROGRESS NOTES
Review of Systems   Constitutional: Negative. HENT:  Negative. Eyes: Negative. Respiratory: Negative. Cardiovascular: Negative. Gastrointestinal: Negative. Endocrine: Positive for hot flashes. Genitourinary:  Positive for pelvic pain (cramping). Musculoskeletal:  Positive for back pain (not new). Skin: Negative. Neurological:  Positive for dizziness, light-headedness and numbness. Hematological:  Bruises/bleeds easily.

## 2023-02-23 NOTE — PROGRESS NOTES
[address] [facility], [facility], [address] [facility] [address] Nubia Roland present for her adenocarcinoma in situ of the cervix surveillance visit. CC/HPI: She is a  AB3 female has a history of adenocarcinoma in situ of the endocervix and has undergone a robotic laparoscopic abdominal hysterectomy, bilateral salpingectomies, peritoneal washings for cytology, cystoscopy and biopsy of vesicouterine peritoneum on 2020. The patient initially was following with her OB/GYN for her annual wellness care. A Pap smear was obtained on 2019 which revealed ASCUS, HPV 16+. She later returned for colposcopy on 2019 and endocervical curettings revealed atypical endocervical glandular epithelium consistent with adenocarcinoma in situ. Two cervical biopsies were taken which also revealed adenocarcinoma in situ. She was then referred to [facility] gynecologic oncology for further evaluation and treatment. Various forms of treatment options were discussed regarding her diagnosis, and the patient had decided that she would like to proceed with a hysterectomy. Tumor markers were obtained preoperatively which revealed a normal limit of CA-125 and CEA on 2020    She ultimately underwent a robotic total abdominal hysterectomy, bilateral salpingectomies with peritoneal washings cystoscopy and biopsy of lesion of the vesicouterine peritoneum on 2020. Final pathology revealed cervix with focal residual adenocarcinoma in situ. No evidence of invasive carcinoma, benign surgical margins. The uterus was also benign and there is no myometrial or serosal lesions identified. Bilateral fallopian tubes were also benign. A bladder flap peritoneal biopsy noted focal endometriosis. Pelvic washings were negative for malignancy. Bilateral ovaries appeared normal and were therefore left in situ. She has followed on surveillance and done well.  Recommendations by ASCCP guidelines for management include HPV testing annually for 3 years then test at 3 year intervals for at least 25 years. Her most recent Pap smear on August 25, 2022 was negative for intraepithelial lesion or malignancy. HPV negative. Of note, the patient had a maternal aunt with breast cancer in her 45s. Patient met with genetic counselor Naldo Bronson and molecular testing revealed patient BRCA negative, no significant mutations detected on her molecular genetic panel in February 2020. It is documented that due to her personal risk factors and family risk factors her estimated lifetime risk of breast cancer is 20% according to the Tyrer-Cuzick risk model. NCCN recommends that women with a lifetime risk of breast cancer above 20% consider the following screening options such as consideration of breast MRI annually with breast mammogram annually. She has remained on active breast examination screening with her last mammogram obtained January 23, 2023, BI-RADS Category 1. Last MRI breast June 2022, was negative BI-RADS category 1. Today, she is doing well. She does admit to bilateral intermittent pelvic cramping which is worsening over last several weeks, worsen R sided. She states bowel continue to fluctuate with IBS. She denies vaginal bleeding or discharge. She will have occasional vaginal spotting with \"rough sex\". Uses lubrication PRN. Normal urination habits. No new lumps or bumps. ROS:  I have personally reviewed and agree with the review of systems done by my ancillary staff in the [facility] documentation. Physical Exam:    Vitals:    02/23/23 0845   BP: 127/80   Site: Left Upper Arm   Position: Sitting   Cuff Size: Medium Adult   Pulse: 78   Temp: 98.4 °F (36.9 °C)   SpO2: 99%   Weight: 197 lb 9.6 oz (89.6 kg)   Height: 5' 2\" (1.575 m)       General: well- appearing, no acute distress, alert and oriented x 3    HEENT: Thyroid normal size.  No cervical or supraclavicular lymphadenopathy. Lungs: Clear to auscultate bilaterally to the bases    No CVA tenderness present bilaterally. Heart: Auscultation reveals regular rate and rhythm. No murmurs or gallops appreciated. Abdomen: Obese, soft. Multiple laparoscopic incision scars present. There are no herniations identified throughout. There are no masses. No detectable organomegaly. No inguinal lymphadenopathy bilaterally. Breasts were symmetric with no dominant mass, no nipple discharge, no axillary adenopathy, nipples were everted bilaterally. Healing lesions on lower L breast from known hidradenitis (treated with topical wash per dermatology)    Extremities: No edema, calf tenderness or deformities bilaterally. Pelvic: The patient has normal female external genitalia including, vulva, urethra, vagina, perineum, Bartholin glands. Uterus, bilateral fallopian tubes, and cervix are surgically absent. Speculum examination using a medium size speculum reveals no blood in vaginal vault. The vaginal cuff well-intact with no signs of erythema, induration, separation, or granulation tissue. Bimanual examination: Bladder is non-tender, without mass. There are no palpable vaginal nodules and no other pelvic masses, tenderness or pathology noted. Assessment/Plan:  Cancer Staging  Adenocarcinoma in situ (AIS) of uterine cervix  Staging form: Cervix Uteri, AJCC 8th Edition  - Clinical stage from 2/20/2020: ycT0, cM0 - Signed by Rosalia Dos Santos MD on 2/20/2020  Staging comments: This was a small area of adenocarcinoma in situ. Cone biopsy revealed no residual disease. All margins clear. Pap smear was positive for P 16 high risk HPV  - Pathologic stage from 2/20/2020: Stage Unknown (ypT0, pNX, cM0) - Signed by Rosalia Dos Santos MD on 2/20/2020  Staging comments: Patient had an adenocarcinoma in situ. Subsequent cold knife cone biopsy revealed no residual disease. All margins negative.     Impression: Hx adenocarcinoma in situ of cervix s/p YUMIKO/bilateral salpingectomy and staging biopsies February 2020. Focal residual adenocarcinoma in situ in final specimen, margins negative. No evidence of invasive disease. Most recent pap smear August 2022 negative, HPV negative. Her Surveillance plan is as follows:   Obtain pelvic ultrasound now to evaluate ovaries for pathology, follow up 2 weeks to discuss results. Reviewed pathology history of endometriosis and patient reports history of \"PCOS\"    2. Plan for MRI breast in June 2023. Reviewed risk and benefits of additional screening with patient as she just meets the cut off for considered high risk breast screening and patient wishes to continue with alternating breast MRI and mammogram screening. 3. Return to clinic in 6 months for follow up exam and repeat cytology and HPV. If results are negative at this time she will then be seen yearly for annual examinations. 4. She is to call or return to clinic sooner with any questions or concerns    I spent 45 minutes preparing the patient's chart, providing care to the patient, counseling and coordinating care, discussing the patient's current situation, reviewing her options, counseling and education her and answering her questions. The patient's pertinent images, labs, and previous records and procedures were reviewed.     Electronically signed by Mary Holloway PA-C on 2/23/2023 at 9:05 AM MARION

## 2023-03-07 ENCOUNTER — APPOINTMENT (OUTPATIENT)
Dept: ULTRASOUND IMAGING | Age: 43
End: 2023-03-07
Payer: MEDICAID

## 2023-03-07 ENCOUNTER — HOSPITAL ENCOUNTER (OUTPATIENT)
Dept: ULTRASOUND IMAGING | Age: 43
Discharge: HOME | End: 2023-03-09
Payer: MEDICAID

## 2023-03-07 DIAGNOSIS — R10.2 PELVIC PAIN: ICD-10-CM

## 2023-03-07 PROCEDURE — 76830 TRANSVAGINAL US NON-OB: CPT

## 2023-03-08 ENCOUNTER — OFFICE VISIT (OUTPATIENT)
Dept: DERMATOLOGY | Age: 43
End: 2023-03-08
Payer: MEDICAID

## 2023-03-08 VITALS
OXYGEN SATURATION: 98 % | BODY MASS INDEX: 37.8 KG/M2 | WEIGHT: 205.4 LBS | SYSTOLIC BLOOD PRESSURE: 121 MMHG | DIASTOLIC BLOOD PRESSURE: 79 MMHG | HEART RATE: 78 BPM | HEIGHT: 62 IN | TEMPERATURE: 98.3 F

## 2023-03-08 DIAGNOSIS — D22.9 IRRITATED NEVUS: ICD-10-CM

## 2023-03-08 DIAGNOSIS — L40.9 PSORIASIS: Primary | ICD-10-CM

## 2023-03-08 DIAGNOSIS — L73.2 HIDRADENITIS SUPPURATIVA: ICD-10-CM

## 2023-03-08 PROCEDURE — 1036F TOBACCO NON-USER: CPT | Performed by: PHYSICIAN ASSISTANT

## 2023-03-08 PROCEDURE — G8417 CALC BMI ABV UP PARAM F/U: HCPCS | Performed by: PHYSICIAN ASSISTANT

## 2023-03-08 PROCEDURE — G8484 FLU IMMUNIZE NO ADMIN: HCPCS | Performed by: PHYSICIAN ASSISTANT

## 2023-03-08 PROCEDURE — 99213 OFFICE O/P EST LOW 20 MIN: CPT | Performed by: PHYSICIAN ASSISTANT

## 2023-03-08 PROCEDURE — G8427 DOCREV CUR MEDS BY ELIG CLIN: HCPCS | Performed by: PHYSICIAN ASSISTANT

## 2023-03-08 NOTE — PATIENT INSTRUCTIONS
Moles    Moles, or nevi, are very common. Moles are areas of the skin where there are more cells called melanocytes. Melanocytes are the cells in the body that produce pigment, or color. Moles can be many colors including skin-tone, pink, tan, brown, and very dark brown to black. Moles can be raised or flat. Moles can have hair. Moles can grow on any skin surface, including the scalp, hands and feet. When someone is born with a mole, or develops one in the first months of life, the mole is called a congenital, or birthmark mole. About 1 in 100 people are born with one or more moles. Most people develop their moles later in childhood or adulthood. These are called acquired moles. They are most common on sun exposed areas of skin such as the face, neck, upper body, arms and legs. CHECKING MOLES  Most moles are harmless, but in rare cases moles may become cancerous. Checking moles and looking for changes is an important step in helping to catch worrisome changes early. Some changes to look for are asymmetry (moles that do not look the same on each half), irregular shapes or borders, uneven color or large size. Also look for any moles that bleed, itch, or become painful. Looking at your skin regularly can help you recognize moles that are more at risk for becoming cancerous. WHEN TO CALL THE DOCTOR  Call your doctor if you see any of the following changes in a mole:       Irregular borders (uneven shape or edges)       Changes in color to black, blue or red. Changes in the surface texture       Scabs, scaling, irritation or bleeding in the mole    TREATMENT FOR MOLES  Often we can simply look at your moles and tell you if they look worrisome. If we are not concerned about the look of your moles at your appointment, we may measure some moles and take some photos that will allow us to watch for future changes in the moles.      TREATMENT FOR MOLES  If a mole is getting irritated frequently, bleeding, difficult to watch due to location or dark color, atypical in appearance, or worrisome, we may perform a skin biopsy. A skin biopsy is a procedure that involves removing the mole so that it can be looked at under a microscope. There are many methods used to remove moles. The method we choose depends on the location of the mole, the size of the mole, and the amount of concern for skin cancer. Generally, removing moles in the dermatologists office is a simple and safe procedure that can be done with local anesthesia. PREVENTION  You can do some things to prevent moles from becoming cancerous:       Try to avoid long periods of time in the sun and severe sunburns. The sun is        especially dangerous between 10:00 am and 4:00 pm.       Use a broad spectrum, water-resistant sun block lotion with an SPF of 30 or        greater. A broad spectrum lotion blocks both UVA and UVB rays from the sun. Re-apply sunscreen at least every 2 hours and after swimming or sweating. Take advantage of shade whenever possible. Wear a broad-brimmed hat,        sunglasses, and protective clothing when outdoors. Do not use tanning beds. - Discussed dry skin care with patient, which includes the following:  A)  Warm, short showers  B)  No scrubbing devices in shower  C)  Dove Sensitive Skin soap  D)  CeraVe cream immediately after showering, while skin is still damp  Sun Protection     There are two types of sun rays that are harmful to the skin. UVA rays cause skin aging and skin cancer, such as melanoma. UVB rays cause sunburns, cataracts, and also contribute to skin cancer. The American-Academy of Dermatology recommends that children and adults wear a broad spectrum, waterproof sunscreen with a Sun Protection Factor (SPF) of 30 or higher. It is important to check the ingredient label to be sure the sunscreen will protect the skin from both UVA and UVB sunrays.   Your sunscreen should contain at least one of the following ingredients: titanium dioxide, zinc oxide, or avobenzone. Sunscreen will not be effective unless it is applied to all exposed skin. Sunscreens work best if they are applied 30 minutes before sun exposure. They should be reapplied every 2 hours and after any water exposure. Sunscreen is not perfect. It is important to use other methods to protect the skin from sun exposure also. Wear hats, sunglasses and other sun protective clothing when outdoors.   Stay in the shade during the peak hours of sun exposure between 10 AM and 4 PM.

## 2023-03-08 NOTE — PROGRESS NOTES
Dermatology Patient Note  [address]  [address] [facility] [address] [address]  Dept: [phone]  Dept Fax: [phone]      VISITDATE: 3/8/2023   REFERRING PROVIDER: No ref. provider found      Richy StampRakel Harris is a 38 y.o. female  who presents today in the office for:    Follow-up (HS pt states she has moles on her back & rt inner thigh she would like looked at. Pt states HS has slowed down ), Psoriasis (Pt states psoriasis is improving ), and Medication Refill (BPO wash, ketoconazole shampoo, volteren gel )      HISTORY OF PRESENT ILLNESS:  HS is somewhat improved, on breasts, with BPO wash. Pt does not tolerate spironolactone (dizziness), clindamycin lotion (irritation), and is s/p cervical CA (contraindication for immunosuppressive Tx such as Dapsone or Humira). Her insurance will not cover Amzeeq. Also c/o multiple papules, irritated by friction (lower back x 2, posterior neck, and right thigh). MEDICAL PROBLEMS:  Patient Active Problem List    Diagnosis Date Noted    Chronic tension-type headache, intractable 12/07/2022     Priority: Medium    Migraine without aura and without status migrainosus, not intractable 12/07/2021    Liver hemangioma 07/07/2020    Hepatic flexure mass 06/16/2020    BRYANT BS, Cytologic Washings, STEVAN & Cystoscopy 2/17/20 02/17/2020    At high risk for breast cancer 02/17/2020     Her lifetime risk for breast cancer is approximately 20% according to the [employer] risk model. At this level of risk, she may consider the NCCN guidelines outlined above, including annual breast MRI screening staggered 6 months apart from annual screening mammogram.= starting between 30-40yrs old.        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     Gyn Onc referral 12/26/19        S/P laparoscopic cholecystectomy     BMI 35.0-35.9,adult 03/09/2018    Colitis 03/01/2018 1/8/2018 per colonoscopy      Vitamin D deficiency 10/17/2016    Hyperlipidemia 09/15/2015    Abnormal TSH 06/16/2015    Asthma 05/05/2015    Murmur 05/05/2015    Seasonal allergies 05/05/2015       CURRENT MEDICATIONS:   Current Outpatient Medications   Medication Sig Dispense Refill    ibuprofen (ADVIL;MOTRIN) 800 MG tablet take 1 tablet by mouth every 8 hours AS NEEDED FOR PAIN 120 tablet 0    omeprazole (PRILOSEC) 20 MG delayed release capsule take 1 capsule by mouth once daily 30 capsule 5    ketoconazole (NIZORAL) 2 % shampoo USE AS BODY WASH LEAVING ON FOR 5 MINUTES PRIOR TO WASHING OFF ONCE DAILY FOR 2 WEEK, THEN THREE TIMES WEEKLY. 120 mL 3    atorvastatin (LIPITOR) 20 MG tablet take 1 tablet by mouth once daily 90 tablet 1    ondansetron (ZOFRAN) 4 MG tablet take 1 tablet by mouth every 8 hours if needed for NAUSEA OR VOMITING. 10 tablet 2    loratadine (CLARITIN) 10 MG tablet take 1 tablet by mouth once daily 30 tablet 11    RA VITAMIN D-3 50 MCG (2000 UT) CAPS take 1 capsule by mouth once daily 30 capsule 5    Minocycline HCl Micronized (AMZEEQ) 4 % FOAM Apply to breasts daily. 30 g 3    benzoyl peroxide 5 % external liquid Wash face, chest and back 1-2 times daily 227 g 3    Peak Flow Meter JUSTIN as directed      Spacer/Aero-Holding Chambers JUSTIN as directed      diclofenac sodium (VOLTAREN) 1 % GEL       Ubrogepant 100 MG TABS Take one at onset of migraine. May repeat in 2 hours.   No more than 2 in 24 hours and 4 in 1 week 10 tablet 5    albuterol sulfate  (90 Base) MCG/ACT inhaler Inhale 2 puffs into the lungs every 6 hours as needed for Wheezing 18 g 3    Aspirin-Acetaminophen-Caffeine (EXCEDRIN EXTRA STRENGTH PO) Take by mouth as needed       Acetaminophen (TYLENOL EXTRA STRENGTH PO) Take 2 tablets by mouth as needed      tiZANidine (ZANAFLEX) 4 MG tablet Take 4 mg by mouth every 8 hours as needed      azelastine (ASTELIN) 0.1 % nasal spray 1-2  puff in each nostril (Patient not taking: No sig reported)      lidocaine (XYLOCAINE) 5 % ointment Apply topically as needed. (Patient not taking: No sig reported) 1 Tube 0     No current facility-administered medications for this visit.        ALLERGIES:   Allergies   Allergen Reactions    Bee Venom Anaphylaxis    Dexamethasone Shortness Of Breath     Hives and hot flashes    Other reaction(s): Unknown  Other reaction(s): anaphylaxis    Imitrex [Sumatriptan] Hives, Shortness Of Breath and Rash     Other reaction(s): Unknown  Chest pain  DO NOT REMOVE    Prednisone      CAUSES HEADACHE, RASH, hard to breathe  Other reaction(s): hives, Rash, Rash    Sulfa Antibiotics Hives, Itching, Rash and Shortness Of Breath    Benzocaine Rash     Mouth numbing medicine- gums and lips swell  Other reaction(s): Unknown    Cyclopentasiloxane Itching and Swelling    Dimethicone Itching and Swelling    Dimethiconol Itching and Swelling    Food Hives     Oysters, clams, mussels, scallops--mollusks    Silicone      Swell up--lubricant    Sulfamethoxazole-Trimethoprim Hives     Bactrim- hives  Other reaction(s): Unknown  Other reaction(s): hives    Tetanus Toxoids      Hives, arm swells up    Influenza Vaccines      Other reaction(s): shortness of breath    Influenza Virus Vaccine     [facility] Skin Test     Other      Other reaction(s): Unknown  Other reaction(s): hives  Other reaction(s): large local swells  Other reaction(s): mouth swelling  Other reaction(s): local swelling and hives    Sulfamethoxazole Hives     Other reaction(s): Hives    Tetanus Immune Globulin      Other reaction(s): local swelling and shortness of breath    Trimethoprim      Other reaction(s): Hives, Hives    Bactrim [Sulfamethoxazole-Trimethoprim] Hives    Clindamycin Hives, Itching and Rash     Clindamycin gel LOTN 1%    Cortisone      HIVES    Diphenhydramine Rash    Seasonal      Hayfever SOCIAL HISTORY:  Social History     Tobacco Use    Smoking status: Former     Packs/day: 0.25     Types: Cigarettes     Start date: 1996     Quit date: 10/1/2006     Years since quittin.4    Smokeless tobacco: Never    Tobacco comments:     half pack a month quit    Substance Use Topics    Alcohol use: Yes     Comment: social        Pertinent ROS:  Review of Systems  Skin: Denies any new changing, growing or bleeding lesions or rashes except as described in the HPI   Constitutional: Denies fevers, chills, and malaise. PHYSICAL EXAM:   /79   Pulse 78   Temp 98.3 °F (36.8 °C) (Temporal)   Ht 5' 2\" (1.575 m)   Wt 205 lb 6.4 oz (93.2 kg)   LMP 2020   SpO2 98%   BMI 37.57 kg/m²     The patient is generally well appearing, well nourished, alert and conversational. Affect is normal.    Cutaneous Exam:  Physical Exam  Sun exposed + limited LEs: Head/face,neck, both arms, digits and/or nails and legs visible with pants/shorts and shoes/socks on was examined. Facial covering was not removed during examination. Diagnoses/exam findings/medical history pertinent to this visit are listed below:    Assessment:   Diagnosis Orders   1. Intradermal nevus        2. Hidradenitis suppurativa        3. Irritated nevus             Plan:  1. Psoriasis  - stable chronic illness     2. Hidradenitis suppurativa  - chronic illness, responding to treatment but not yet at goal     3.  Irritated nevus  - Schedule shave removal x 4 (right thigh, posterior neck, and lower back x 2)        RTC 6M / Shave removals    Future Appointments   Date Time Provider Skylar Foster   3/9/2023  2:00 PM [facility] [place] GYN Oncology [facility]   3/14/2023  9:30 AM MD Jojo Herrera [facility]   2023  8:00 AM ANITA Roberts derm [facility]   5/3/2023  3:00 PM ANITA Roberts derm [facility]   2023  8:00 AM Lana Barrera PA-C GYN Oncology [facility]         Patient Instructions   [facility], or nevi, are very common. Moles are areas of the skin where there are more cells called melanocytes. Melanocytes are the cells in the body that produce pigment, or color. Moles can be many colors including skin-tone, pink, tan, brown, and very dark brown to black. Moles can be raised or flat. Moles can have hair. Moles can grow on any skin surface, including the scalp, hands and feet. When someone is born with a mole, or develops one in the first months of life, the mole is called a congenital, or birthmark mole. About 1 in 100 people are born with one or more moles. Most people develop their moles later in childhood or adulthood. These are called acquired moles. They are most common on sun exposed areas of skin such as the face, neck, upper body, arms and legs. CHECKING MOLES  Most moles are harmless, but in rare cases moles may become cancerous. Checking moles and looking for changes is an important step in helping to catch worrisome changes early. Some changes to look for are asymmetry (moles that do not look the same on each half), irregular shapes or borders, uneven color or large size. Also look for any moles that bleed, itch, or become painful. Looking at your skin regularly can help you recognize moles that are more at risk for becoming cancerous. WHEN TO CALL THE DOCTOR  Call your doctor if you see any of the following changes in a mole:       Irregular borders (uneven shape or edges)       Changes in color to black, blue or red. Changes in the surface texture       Scabs, scaling, irritation or bleeding in the mole    TREATMENT FOR MOLES  Often we can simply look at your moles and tell you if they look worrisome. If we are not concerned about the look of your moles at your appointment, we may measure some moles and take some photos that will allow us to watch for future changes in the moles.      TREATMENT FOR MOLES  If a mole is getting irritated frequently, bleeding, difficult to watch due to location or dark color, atypical in appearance, or worrisome, we may perform a skin biopsy. A skin biopsy is a procedure that involves removing the mole so that it can be looked at under a microscope. There are many methods used to remove moles. The method we choose depends on the location of the mole, the size of the mole, and the amount of concern for skin cancer. Generally, removing moles in the dermatologists office is a simple and safe procedure that can be done with local anesthesia. PREVENTION  You can do some things to prevent moles from becoming cancerous:       Try to avoid long periods of time in the sun and severe sunburns. The sun is        especially dangerous between 10:00 am and 4:00 pm.       Use a broad spectrum, water-resistant sun block lotion with an SPF of 30 or        greater. A broad spectrum lotion blocks both UVA and UVB rays from the sun. Re-apply sunscreen at least every 2 hours and after swimming or sweating. Take advantage of shade whenever possible. Wear a broad-brimmed hat,        sunglasses, and protective clothing when outdoors. Do not use tanning beds. - Discussed dry skin care with patient, which includes the following:  A)  Warm, short showers  B)  No scrubbing devices in shower  C)  Dove Sensitive Skin soap  D)  CeraVe cream immediately after showering, while skin is still damp  Sun Protection     There are two types of sun rays that are harmful to the skin. UVA rays cause skin aging and skin cancer, such as melanoma. UVB rays cause sunburns, cataracts, and also contribute to skin cancer. The American-Academy of Dermatology recommends that children and adults wear a broad spectrum, waterproof sunscreen with a Sun Protection Factor (SPF) of 30 or higher. It is important to check the ingredient label to be sure the sunscreen will protect the skin from both UVA and UVB sunrays.   Your sunscreen should contain at least one of the following ingredients: titanium dioxide, zinc oxide, or avobenzone. Sunscreen will not be effective unless it is applied to all exposed skin. Sunscreens work best if they are applied 30 minutes before sun exposure. They should be reapplied every 2 hours and after any water exposure. Sunscreen is not perfect. It is important to use other methods to protect the skin from sun exposure also. Wear hats, sunglasses and other sun protective clothing when outdoors.   Stay in the shade during the peak hours of sun exposure between 10 AM and 4 PM.      Electronically signed by Bakari Nash PA-C on 3/8/23 at 2:30 PM EST

## 2023-03-09 ENCOUNTER — SCHEDULED TELEPHONE ENCOUNTER (OUTPATIENT)
Dept: GYNECOLOGIC ONCOLOGY | Age: 43
End: 2023-03-09

## 2023-03-09 DIAGNOSIS — N80.9 ENDOMETRIOSIS: ICD-10-CM

## 2023-03-09 DIAGNOSIS — R10.2 PELVIC PAIN: Primary | ICD-10-CM

## 2023-03-09 NOTE — PROGRESS NOTES
Irene DooleyRakel Barrios is a 38 y.o. female evaluated via telephone on 3/9/2023 for No chief complaint on file. Documentation:  I communicated with the patient and/or health care decision maker about ultrasound results. HPI:  She is a  AB3 female has a history of adenocarcinoma in situ of the endocervix and has undergone a robotic laparoscopic abdominal hysterectomy, bilateral salpingectomies, peritoneal washings for cytology, cystoscopy and biopsy of vesicouterine peritoneum on 2020. The patient initially was following with her OB/GYN for her annual wellness care. A Pap smear was obtained on 2019 which revealed ASCUS, HPV 16+. She later returned for colposcopy on 2019 and endocervical curettings revealed atypical endocervical glandular epithelium consistent with adenocarcinoma in situ. Two cervical biopsies were taken which also revealed adenocarcinoma in situ. She was then referred to [facility] gynecologic oncology for further evaluation and treatment. Various forms of treatment options were discussed regarding her diagnosis, and the patient had decided that she would like to proceed with a hysterectomy. Tumor markers were obtained preoperatively which revealed a normal limit of CA-125 and CEA on 2020    She ultimately underwent a robotic total abdominal hysterectomy, bilateral salpingectomies with peritoneal washings cystoscopy and biopsy of lesion of the vesicouterine peritoneum on 2020. Final pathology revealed cervix with focal residual adenocarcinoma in situ. No evidence of invasive carcinoma, benign surgical margins. The uterus was also benign and there is no myometrial or serosal lesions identified. Bilateral fallopian tubes were also benign. A bladder flap peritoneal biopsy noted focal endometriosis. Pelvic washings were negative for malignancy. Bilateral ovaries appeared normal and were therefore left in situ.     She has followed on surveillance and done well. Recommendations by ASCCP guidelines for management include HPV testing annually for 3 years then test at 3 year intervals for at least 25 years. Her most recent Pap smear on August 25, 2022 was negative for intraepithelial lesion or malignancy. HPV negative. Of note, the patient had a maternal aunt with breast cancer in her 45s. Patient met with genetic counselor Lois Umaña and molecular testing revealed patient BRCA negative, no significant mutations detected on her molecular genetic panel in February 2020. It is documented that due to her personal risk factors and family risk factors her estimated lifetime risk of breast cancer is 20% according to the [employer] risk model. NCCN recommends that women with a lifetime risk of breast cancer above 20% consider the following screening options such as consideration of breast MRI annually with breast mammogram annually. She has remained on active breast examination screening with her last mammogram obtained January 23, 2023, BI-RADS Category 1. Last MRI breast June 2022, was negative BI-RADS category 1. Today, the patient states she is doing well. She continues with pelvic cramping. She has chronic IBS. She is concerned about her R ovary size on ultrasound due  to a family history of ovarian cancer. We reviewed her ultrasound in depth and appears relatively unremarkable. Bilateral ovaries normal in size with simple appearing R ovarian cyst. Due to patient's concerns I did offer to repeat pelvic ultrasound in 8-10 weeks. We also reviewed trial of progesterone for patient's symptoms as she does have biopsy proven endometriosis of bladder peritoneum at time of hysterectomy for adenocarcinoma in situ of cervix. Patient states previously on oral OCP prior to hysterectomy and this did well for her but she needs \"the lowest dose\".  I advised patient to start out with 5 mg daily of aygestin and we have room to increase dose based on her symptoms and tolerability. I have asked that she call office if she has any additional questions or concerns or to notify how she is doing on medication. We will see her back in 10-12 weeks with repeat pelvic ultrasound prior and to discuss endometriosis tx.     Total Time: minutes: 11-20 minutes      Shelby Simpson was evaluated through a synchronous (real-time) audio encounter. Patient identification was verified at the start of the visit. She (or guardian if applicable) is aware that this is a billable service, which includes applicable co-pays. This visit was conducted with the patient's (and/or legal guardian's) verbal consent. She has not had a related appointment within my department in the past 7 days or scheduled within the next 24 hours.   The patient was located at Home: [address] [place] [address] [place] [address].  The provider was located at Facility (Appt Dept): [address] [place] [address]  [address] - [address] [place] [id number].    Note: not billable if this call serves to triage the patient into an appointment for the relevant concern    Velma Messina PA-C

## 2023-03-13 NOTE — TELEPHONE ENCOUNTER
Pharmacy requesting refill of Ondansetron 4 mg.       Medication active on med list: yes      Date of last fill: 12/19/22 for #10 and 2 refills  verified on 3/13/2023    verified by Alicia Landrum LPN      Date of last appointment: 12/7/2022    Next Visit Date: 1 yr

## 2023-03-14 ENCOUNTER — OFFICE VISIT (OUTPATIENT)
Dept: FAMILY MEDICINE CLINIC | Age: 43
End: 2023-03-14
Payer: MEDICAID

## 2023-03-14 VITALS
TEMPERATURE: 97.3 F | HEIGHT: 62 IN | BODY MASS INDEX: 36.99 KG/M2 | SYSTOLIC BLOOD PRESSURE: 126 MMHG | HEART RATE: 78 BPM | OXYGEN SATURATION: 96 % | DIASTOLIC BLOOD PRESSURE: 74 MMHG | WEIGHT: 201 LBS

## 2023-03-14 DIAGNOSIS — E78.2 MIXED HYPERLIPIDEMIA: ICD-10-CM

## 2023-03-14 DIAGNOSIS — R79.82 ELEVATED C-REACTIVE PROTEIN (CRP): ICD-10-CM

## 2023-03-14 DIAGNOSIS — J45.31 MILD PERSISTENT ASTHMA WITH ACUTE EXACERBATION: Primary | ICD-10-CM

## 2023-03-14 DIAGNOSIS — Z13.1 SCREENING FOR DIABETES MELLITUS: ICD-10-CM

## 2023-03-14 DIAGNOSIS — E55.9 VITAMIN D DEFICIENCY: ICD-10-CM

## 2023-03-14 PROCEDURE — 1036F TOBACCO NON-USER: CPT | Performed by: STUDENT IN AN ORGANIZED HEALTH CARE EDUCATION/TRAINING PROGRAM

## 2023-03-14 PROCEDURE — G8417 CALC BMI ABV UP PARAM F/U: HCPCS | Performed by: STUDENT IN AN ORGANIZED HEALTH CARE EDUCATION/TRAINING PROGRAM

## 2023-03-14 PROCEDURE — 99214 OFFICE O/P EST MOD 30 MIN: CPT | Performed by: STUDENT IN AN ORGANIZED HEALTH CARE EDUCATION/TRAINING PROGRAM

## 2023-03-14 PROCEDURE — G8427 DOCREV CUR MEDS BY ELIG CLIN: HCPCS | Performed by: STUDENT IN AN ORGANIZED HEALTH CARE EDUCATION/TRAINING PROGRAM

## 2023-03-14 PROCEDURE — G8484 FLU IMMUNIZE NO ADMIN: HCPCS | Performed by: STUDENT IN AN ORGANIZED HEALTH CARE EDUCATION/TRAINING PROGRAM

## 2023-03-14 RX ORDER — ALBUTEROL SULFATE 90 UG/1
2 AEROSOL, METERED RESPIRATORY (INHALATION) EVERY 6 HOURS PRN
Qty: 18 G | Refills: 3 | Status: SHIPPED | OUTPATIENT
Start: 2023-03-14

## 2023-03-14 RX ORDER — ONDANSETRON 4 MG/1
TABLET, FILM COATED ORAL
Qty: 10 TABLET | Refills: 2 | Status: SHIPPED | OUTPATIENT
Start: 2023-03-14

## 2023-03-14 RX ORDER — MONTELUKAST SODIUM 10 MG/1
10 TABLET ORAL DAILY
Qty: 90 TABLET | Refills: 1 | Status: SHIPPED | OUTPATIENT
Start: 2023-03-14

## 2023-03-14 SDOH — ECONOMIC STABILITY: FOOD INSECURITY: WITHIN THE PAST 12 MONTHS, THE FOOD YOU BOUGHT JUST DIDN'T LAST AND YOU DIDN'T HAVE MONEY TO GET MORE.: NEVER TRUE

## 2023-03-14 SDOH — ECONOMIC STABILITY: FOOD INSECURITY: WITHIN THE PAST 12 MONTHS, YOU WORRIED THAT YOUR FOOD WOULD RUN OUT BEFORE YOU GOT MONEY TO BUY MORE.: NEVER TRUE

## 2023-03-14 SDOH — ECONOMIC STABILITY: HOUSING INSECURITY
IN THE LAST 12 MONTHS, WAS THERE A TIME WHEN YOU DID NOT HAVE A STEADY PLACE TO SLEEP OR SLEPT IN A SHELTER (INCLUDING NOW)?: NO

## 2023-03-14 SDOH — ECONOMIC STABILITY: INCOME INSECURITY: HOW HARD IS IT FOR YOU TO PAY FOR THE VERY BASICS LIKE FOOD, HOUSING, MEDICAL CARE, AND HEATING?: NOT HARD AT ALL

## 2023-03-14 ASSESSMENT — ENCOUNTER SYMPTOMS
VOMITING: 0
DIARRHEA: 0
SHORTNESS OF BREATH: 0
NAUSEA: 0
COUGH: 0
CONSTIPATION: 0
ABDOMINAL PAIN: 0
WHEEZING: 1
SORE THROAT: 0
EYE DISCHARGE: 0
CHEST TIGHTNESS: 0

## 2023-03-14 NOTE — PROGRESS NOTES
[address] PRIMARY CARE  [address] [place] [address] [address] [place] [address]  Dept: [phone]  Dept Fax: [phone] Cher Petit is a 38 y.o. female who is a Established patient, who presents today for her medical conditions/complaints as noted below:  Chief Complaint   Patient presents with    Hyperlipidemia     6 month follow up     Medication Check    Asthma    Medication Refill         HPI:     She is here today to follow-up on asthma and hyperlipidemia. She states that her asthma flares up during spring and fall season and lately doing her albuterol inhaler twice a day. She is not able to go on any controller inhaler because she cannot tolerate steroids. She states that she had a previous anaphylactic reaction to dexamethasone and had hives with prednisone. She is following with allergy specialist and was advised to get desensitized to dexamethasone but she declined at that time. States that she continues to deal with chronic pain and was not able to schedule with rheumatology yet. She never started the Cymbalta saying that she would wait for rheumatology appointment. Hemoglobin A1C (%)   Date Value   02/12/2018 5.0             ( goal A1Cis < 7)   No results found for: LABMICR  LDL Cholesterol (mg/dL)   Date Value   03/07/2022 69   03/29/2021 59   01/30/2020 79       (goal LDL is <100)   AST (U/L)   Date Value   03/07/2022 15     ALT (U/L)   Date Value   03/07/2022 15     BUN (mg/dL)   Date Value   03/07/2022 14     BP Readings from Last 3 Encounters:   03/14/23 126/74   03/08/23 121/79   02/23/23 127/80          (goal 120/80)    Past Medical History:   Diagnosis Date    Abnormal MRI 7/2/2020    Abnormal ultrasound of gallbladder 12/11/2017 12/11/2017  No gallstones 2. There does appear to be some thickening of the gallbladder wall. In the proper clinical setting, acalculous cholecystitis could give this appearance.  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 Ivanna ramirez ([facility] [address]     last time 11/2019    Prolonged emergence from general anesthesia     Psoriasis     Pulmonary stenosis     Ringing in ears     SEES AN ENT    Sacroiliac joint dysfunction of both sides 06/18/2019    Sacroiliitis ([facility] [address] 06/18/2019    Symptomatic cholelithiasis     Wears glasses       Past Surgical History:   Procedure Laterality Date    COLONOSCOPY  01/08/2018    COLON, RANDOM BIOPSIES: MILD FOCAL ACTIVE COLITIS, HEMORRHOIDS    COLPOSCOPY  12/20/2019    Dr. Mamie Adame (CERVIX STATUS UNKNOWN) N/A 02/17/2020    LAPAROSCOPIC XI ROBOTIC TOTAL HYSTERECTOMY, BILATERAL SALPINGECTOMY, CYTOLOGIC WASHINGS, CYSTOSCOPY performed by Hildred Lefort, MD at [address] [facility], TOTAL ABDOMINAL (CERVIX REMOVED)  02/17/2020    ovaries are still present    LEEP  2008    NERVE BLOCK  06/2019    NERVE BLOCK  07/2019    MO COLONOSCOPY W/BIOPSY SINGLE/MULTIPLE N/A 01/08/2018    COLONOSCOPY WITH BIOPSY performed by Zahra Luna MD at [facility] EGD TRANSORAL BIOPSY SINGLE/MULTIPLE N/A 01/08/2018    EGD BIOPSY performed by Zahra Luna MD at [address] [place] [address] N/A 04/27/2018    [address] performed by Milka Seaman MD at [address] [facility]      may 2021    WISDOM TOOTH EXTRACTION  01/08/2016       Family History   Problem Relation Age of Onset    Other Mother blood disorder    Diabetes Father     Other Maternal Aunt         blood disorder    Cancer Maternal Aunt         breast and cervical cancer    Breast Cancer Maternal Aunt     Other Maternal Aunt         brain aneurysm    Cancer Paternal Aunt         cervical cancer    Other Paternal Aunt         chiari malformation type 2     Cancer Maternal Grandmother         uterine and ovarian cancer    Cancer Maternal Grandfather     High Blood Pressure Paternal Grandfather     Heart Disease Paternal Grandfather     Cancer Paternal Grandfather        Social History     Tobacco Use    Smoking status: Former     Packs/day: 0.25     Types: Cigarettes     Start date: 1996     Quit date: 10/1/2006     Years since quittin.4    Smokeless tobacco: Never    Tobacco comments:     half pack a month quit    Substance Use Topics    Alcohol use: Yes     Comment: social       Current Outpatient Medications   Medication Sig Dispense Refill    ondansetron (ZOFRAN) 4 MG tablet take 1 tablet by mouth every 8 hours if needed for NAUSEA OR VOMITING. 10 tablet 2    albuterol sulfate HFA (PROVENTIL;VENTOLIN;PROAIR) 108 (90 Base) MCG/ACT inhaler Inhale 2 puffs into the lungs every 6 hours as needed for Wheezing 18 g 3    montelukast (SINGULAIR) 10 MG tablet Take 1 tablet by mouth daily 90 tablet 1    norethindrone (AYGESTIN) 5 MG tablet Take 1 tablet by mouth daily 30 tablet 2    ibuprofen (ADVIL;MOTRIN) 800 MG tablet take 1 tablet by mouth every 8 hours AS NEEDED FOR PAIN 120 tablet 0    omeprazole (PRILOSEC) 20 MG delayed release capsule take 1 capsule by mouth once daily 30 capsule 5    ketoconazole (NIZORAL) 2 % shampoo USE AS BODY WASH LEAVING ON FOR 5 MINUTES PRIOR TO WASHING OFF ONCE DAILY FOR 2 WEEK, THEN THREE TIMES WEEKLY.  120 mL 3    atorvastatin (LIPITOR) 20 MG tablet take 1 tablet by mouth once daily 90 tablet 1    loratadine (CLARITIN) 10 MG tablet take 1 tablet by mouth once daily 30 tablet 11    RA VITAMIN D-3 50 MCG (2000 UT) CAPS take 1 capsule by mouth once daily 30 capsule 5    benzoyl peroxide 5 % external liquid Wash face, chest and back 1-2 times daily 227 g 3    Peak Flow Meter JUSTIN as directed      azelastine (ASTELIN) 0.1 % nasal spray 1-2  puff in each nostril      Spacer/Aero-Holding Chambers JUSTIN as directed      diclofenac sodium (VOLTAREN) 1 % GEL       Ubrogepant 100 MG TABS Take one at onset of migraine. May repeat in 2 hours. No more than 2 in 24 hours and 4 in 1 week 10 tablet 5    Aspirin-Acetaminophen-Caffeine (EXCEDRIN EXTRA STRENGTH PO) Take by mouth as needed       Acetaminophen (TYLENOL EXTRA STRENGTH PO) Take 2 tablets by mouth as needed      tiZANidine (ZANAFLEX) 4 MG tablet Take 4 mg by mouth every 8 hours as needed      Minocycline HCl Micronized (AMZEEQ) 4 % FOAM Apply to breasts daily. 30 g 3     No current facility-administered medications for this visit.      Allergies   Allergen Reactions    Bee Venom Anaphylaxis    Dexamethasone Shortness Of Breath     Hives and hot flashes    Other reaction(s): Unknown  Other reaction(s): anaphylaxis    Imitrex [Sumatriptan] Hives, Shortness Of Breath and Rash     Other reaction(s): Unknown  Chest pain  DO NOT REMOVE    Prednisone      CAUSES HEADACHE, RASH, hard to breathe  Other reaction(s): hives, Rash, Rash  Other reaction(s): Rash, Rash    Sulfa Antibiotics Hives, Itching, Rash and Shortness Of Breath    Benzocaine Rash     Mouth numbing medicine- gums and lips swell  Other reaction(s): Unknown    Cyclopentasiloxane Itching and Swelling    Dimethicone Itching and Swelling    Dimethiconol Itching and Swelling    Food Hives     Oysters, clams, mussels, scallops--mollusks    Silicone      Swell up--lubricant    Sulfamethoxazole-Trimethoprim Hives     Bactrim- hives  Other reaction(s): Unknown  Other reaction(s): hives    Tetanus Toxoids      Hives, arm swells up    Influenza Vaccines      Other reaction(s): shortness of breath    Influenza Virus Vaccine [place] Clam (Gianna Blholly) Skin Test     Other      Other reaction(s): Unknown  Other reaction(s): hives  Other reaction(s): large local swells  Other reaction(s): mouth swelling  Other reaction(s): local swelling and hives    Shellfish Allergy     Sulfamethoxazole Hives     Other reaction(s): Hives  Other reaction(s): Hives, Hives    Tetanus Immune Globulin      Other reaction(s): local swelling and shortness of breath    Trimethoprim      Other reaction(s): Hives, Hives  Other reaction(s): Hives, Hives    Bactrim [Sulfamethoxazole-Trimethoprim] Hives    Clindamycin Hives, Itching and Rash     Clindamycin gel LOTN 1%    Cortisone      HIVES    Diphenhydramine Rash    Seasonal      Hayfever        Health Maintenance   Topic Date Due    COVID-19 Vaccine (1) Never done    Lipids  03/07/2023    Depression Screen  03/15/2023    Breast cancer screen  01/23/2024    Diabetes screen  03/07/2025    Pneumococcal 0-64 years Vaccine (3 - PPSV23 if available, else PCV20) 11/14/2047    Hepatitis C screen  Completed    HIV screen  Completed    Hepatitis A vaccine  Aged Out    Hib vaccine  Aged Out    Meningococcal (ACWY) vaccine  Aged Out    Varicella vaccine  Discontinued       Subjective:     Review of Systems   Constitutional:  Negative for appetite change, fatigue and fever. HENT:  Negative for congestion, ear pain, hearing loss and sore throat. Eyes:  Negative for discharge and visual disturbance. Respiratory:  Positive for wheezing. Negative for cough, chest tightness and shortness of breath. Cardiovascular:  Negative for chest pain, palpitations and leg swelling. Gastrointestinal:  Negative for abdominal pain, constipation, diarrhea, nausea and vomiting. Genitourinary:  Negative for flank pain, frequency, hematuria and urgency. Musculoskeletal:  Positive for arthralgias and myalgias. Negative for gait problem and joint swelling. Skin: Negative.     Neurological:  Negative for dizziness, weakness, numbness and headaches. Psychiatric/Behavioral: Negative. Negative for dysphoric mood. The patient is not nervous/anxious. Objective:     Physical Exam  Vitals reviewed. Constitutional:       Appearance: Normal appearance. She is normal weight. HENT:      Head: Normocephalic and atraumatic. Nose: Nose normal.      Mouth/Throat:      Mouth: Mucous membranes are moist.      Pharynx: Oropharynx is clear. Eyes:      Extraocular Movements: Extraocular movements intact. Conjunctiva/sclera: Conjunctivae normal.      Pupils: Pupils are equal, round, and reactive to light. Cardiovascular:      Rate and Rhythm: Normal rate and regular rhythm. Heart sounds: Normal heart sounds. No murmur heard. No gallop. Pulmonary:      Effort: Pulmonary effort is normal. No respiratory distress. Breath sounds: Normal breath sounds. No stridor. No wheezing. Abdominal:      General: Bowel sounds are normal. There is no distension. Palpations: Abdomen is soft. Tenderness: There is no abdominal tenderness. There is no guarding or rebound. Musculoskeletal:         General: No swelling or tenderness. Normal range of motion. Cervical back: Normal range of motion and neck supple. Skin:     General: Skin is warm and dry. Coloration: Skin is not jaundiced. Findings: No rash. Neurological:      General: No focal deficit present. Mental Status: She is alert and oriented to person, place, and time. Psychiatric:         Mood and Affect: Mood normal.         Behavior: Behavior normal.         Thought Content: Thought content normal.         Judgment: Judgment normal.     /74   Pulse 78   Temp 97.3 °F (36.3 °C)   Ht 5' 2\" (1.575 m)   Wt 201 lb (91.2 kg)   LMP 01/09/2020   SpO2 96%   BMI 36.76 kg/m²     Assessment/Plan:   1.  Mild persistent asthma with acute exacerbation  -     albuterol sulfate HFA (PROVENTIL;VENTOLIN;PROAIR) 108 (90 Base) MCG/ACT inhaler; Inhale 2 puffs into the lungs every 6 hours as needed for Wheezing, Disp-18 g, R-3Normal  -     montelukast (SINGULAIR) 10 MG tablet; Take 1 tablet by mouth daily, Disp-90 tablet, R-1Normal  2. Mixed hyperlipidemia  -     Comprehensive Metabolic Panel, Fasting; Future  -     Lipid, Fasting; Future  3. Elevated C-reactive protein (CRP)  -     TOBIAS Kaye MD, Rheumatology, [place]  4. Vitamin D deficiency  -     Vitamin D 25 Hydroxy; Future  5. BMI 35.0-35.9,adult  -     CBC with Auto Differential; Future  -     TSH with Reflex; Future  6. Screening for diabetes mellitus  -     Hemoglobin A1C; Future      Asthma-poorly controlled, using albuterol inhaler twice daily. Cannot use steroids due to allergy, started on daily Singulair. Discussed checking with allergy specialist to get desensitized to dexamethasone.     Hyperlipidemia-stable, on atorvastatin 20 mg daily    History of elevated CRP-referral placed for rheumatology      Return in about 1 year (around 3/14/2024) for annual.    Orders Placed This Encounter   Procedures    CBC with Auto Differential     Standing Status:   Future     Standing Expiration Date:   9/14/2023    Comprehensive Metabolic Panel, Fasting     Standing Status:   Future     Standing Expiration Date:   9/14/2023    Hemoglobin A1C     Standing Status:   Future     Standing Expiration Date:   9/14/2023    Lipid, Fasting     Standing Status:   Future     Standing Expiration Date:   9/14/2023    TSH with Reflex     Standing Status:   Future     Standing Expiration Date:   9/14/2023    Vitamin D 25 Hydroxy     Standing Status:   Future     Standing Expiration Date:   9/14/2023    TOBIAS Kaye MD, Rheumatology, [place]     Referral Priority:   Routine     Referral Type:   Eval and Treat     Referral Reason:   Specialty Services Required     Referred to Provider:   Josh Sloan MD     Requested Specialty:   Rheumatology     Number of Visits Requested:   1     Orders Placed This Encounter   Medications    albuterol sulfate HFA (PROVENTIL;VENTOLIN;PROAIR) 108 (90 Base) MCG/ACT inhaler     Sig: Inhale 2 puffs into the lungs every 6 hours as needed for Wheezing     Dispense:  18 g     Refill:  3    montelukast (SINGULAIR) 10 MG tablet     Sig: Take 1 tablet by mouth daily     Dispense:  90 tablet     Refill:  1       Patient given educational materials - see patient instructions. Discussed use, benefit, and side effects of prescribed medications. All patientquestions answered. Pt voiced understanding. Reviewed health maintenance. Instructedto continue current medications, diet and exercise. Patient agreed with treatmentplan. Follow up as directed.      Electronically signed by Indira Borges MD on 3/14/2023 at 10:38 AM

## 2023-03-31 ENCOUNTER — HOSPITAL ENCOUNTER (OUTPATIENT)
Age: 43
Setting detail: SPECIMEN
Discharge: HOME | End: 2023-03-31

## 2023-03-31 DIAGNOSIS — Z13.1 SCREENING FOR DIABETES MELLITUS: ICD-10-CM

## 2023-03-31 DIAGNOSIS — E55.9 VITAMIN D DEFICIENCY: ICD-10-CM

## 2023-03-31 DIAGNOSIS — E78.2 MIXED HYPERLIPIDEMIA: ICD-10-CM

## 2023-03-31 LAB
25(OH)D3 SERPL-MCNC: 30.7 NG/ML
ABSOLUTE EOS #: 0.1 K/UL (ref 0–0.44)
ABSOLUTE IMMATURE GRANULOCYTE: 0.04 K/UL (ref 0–0.3)
ABSOLUTE LYMPH #: 3.08 K/UL (ref 1.1–3.7)
ABSOLUTE MONO #: 0.85 K/UL (ref 0.1–1.2)
ALBUMIN SERPL-MCNC: 4.7 G/DL (ref 3.5–5.2)
ALBUMIN/GLOBULIN RATIO: 1.5 (ref 1–2.5)
ALP SERPL-CCNC: 66 U/L (ref 35–104)
ALT SERPL-CCNC: 10 U/L (ref 5–33)
ANION GAP SERPL CALCULATED.3IONS-SCNC: 14 MMOL/L (ref 9–17)
AST SERPL-CCNC: 14 U/L
BASOPHILS # BLD: 0 % (ref 0–2)
BASOPHILS ABSOLUTE: 0.04 K/UL (ref 0–0.2)
BILIRUB SERPL-MCNC: 1.4 MG/DL (ref 0.3–1.2)
BUN SERPL-MCNC: 14 MG/DL (ref 6–20)
CALCIUM SERPL-MCNC: 9.8 MG/DL (ref 8.6–10.4)
CHLORIDE SERPL-SCNC: 102 MMOL/L (ref 98–107)
CHOLEST SERPL-MCNC: 161 MG/DL
CHOLESTEROL/HDL RATIO: 3.1
CO2 SERPL-SCNC: 22 MMOL/L (ref 20–31)
CREAT SERPL-MCNC: 0.69 MG/DL (ref 0.5–0.9)
EOSINOPHILS RELATIVE PERCENT: 1 % (ref 1–4)
EST. AVERAGE GLUCOSE BLD GHB EST-MCNC: 94 MG/DL
GFR SERPL CREATININE-BSD FRML MDRD: >60 ML/MIN/1.73M2
GLUCOSE SERPL-MCNC: 84 MG/DL (ref 70–99)
HBA1C MFR BLD: 4.9 % (ref 4–6)
HCT VFR BLD AUTO: 47.7 % (ref 36.3–47.1)
HDLC SERPL-MCNC: 52 MG/DL
HGB BLD-MCNC: 14.9 G/DL (ref 11.9–15.1)
IMMATURE GRANULOCYTES: 0 %
LDLC SERPL CALC-MCNC: 89 MG/DL (ref 0–130)
LYMPHOCYTES # BLD: 26 % (ref 24–43)
MCH RBC QN AUTO: 29.6 PG (ref 25.2–33.5)
MCHC RBC AUTO-ENTMCNC: 31.2 G/DL (ref 28.4–34.8)
MCV RBC AUTO: 94.8 FL (ref 82.6–102.9)
MONOCYTES # BLD: 7 % (ref 3–12)
NRBC AUTOMATED: 0 PER 100 WBC
PDW BLD-RTO: 13.1 % (ref 11.8–14.4)
PLATELET # BLD AUTO: 411 K/UL (ref 138–453)
PMV BLD AUTO: 9.3 FL (ref 8.1–13.5)
POTASSIUM SERPL-SCNC: 4.1 MMOL/L (ref 3.7–5.3)
PROT SERPL-MCNC: 7.9 G/DL (ref 6.4–8.3)
RBC # BLD: 5.03 M/UL (ref 3.95–5.11)
SEG NEUTROPHILS: 66 % (ref 36–65)
SEGMENTED NEUTROPHILS ABSOLUTE COUNT: 7.58 K/UL (ref 1.5–8.1)
SODIUM SERPL-SCNC: 138 MMOL/L (ref 135–144)
TRIGL SERPL-MCNC: 101 MG/DL
TSH SERPL-ACNC: 3.96 UIU/ML (ref 0.3–5)
WBC # BLD AUTO: 11.7 K/UL (ref 3.5–11.3)

## (undated) DEVICE — SOLUTION SCRB 4OZ 4% CHG H2O AIDED FOR PREOPERATIVE SKIN

## (undated) DEVICE — AIRSEAL 8 MM ACCESS PORT AND LOW PROFILE OBTURATOR WITH BLADELESS OPTICAL TIP, 120 MM LENGTH: Brand: AIRSEAL

## (undated) DEVICE — INSUFFLATION NEEDLE TO ESTABLISH PNEUMOPERITONEUM.: Brand: INSUFFLATION NEEDLE

## (undated) DEVICE — CHLORAPREP 26ML ORANGE

## (undated) DEVICE — GARMENT,MEDLINE,DVT,INT,CALF,MED, GEN2: Brand: MEDLINE

## (undated) DEVICE — CANNULA IV 18GA L15IN BLNT FILL LUERLOCK HUB MJCT

## (undated) DEVICE — AGENT HEMSTAT W2XL14IN OXIDIZED REGENERATED CELOS ABSRB FOR

## (undated) DEVICE — TOTAL TRAY, 16FR 10ML SIL FOLEY, URN: Brand: MEDLINE

## (undated) DEVICE — DEVICE TRCR 12X9X3IN WHT CLSR DISP OMNICLOSE

## (undated) DEVICE — TRAP,MUCUS SPECIMEN, 80CC: Brand: MEDLINE

## (undated) DEVICE — COVER LT HNDL BLU PLAS

## (undated) DEVICE — CONTAINER,SPECIMEN,4OZ,OR STRL: Brand: MEDLINE

## (undated) DEVICE — GAUZE,SPONGE,4"X4",16PLY,XRAY,STRL,LF: Brand: MEDLINE

## (undated) DEVICE — MARKER,SKIN,WI/RULER AND LABELS: Brand: MEDLINE

## (undated) DEVICE — 3M™ STERI-STRIP™ COMPOUND BENZOIN TINCTURE 40 BAGS/CARTON 4 CARTONS/CASE C1544: Brand: 3M™ STERI-STRIP™

## (undated) DEVICE — AIRSEAL 12 MM ACCESS PORT AND PALM GRIP OBTURATOR WITH BLADELESS OPTICAL TIP, 120 MM LENGTH: Brand: AIRSEAL

## (undated) DEVICE — Device

## (undated) DEVICE — NEEDLE ECHOGENIC 20GA L4IN INSUL W/ 30DEG BVL EXTN SET

## (undated) DEVICE — TIP COVER ACCESSORY

## (undated) DEVICE — COUNTER NDL 10 COUNT HLD 20 FOAM BLK SGL MAG

## (undated) DEVICE — SUTURE VCRL SZ 0 L27IN ABSRB UD L36MM CT-1 1/2 CIR J260H

## (undated) DEVICE — CATHETER URETH 16FR BLLN 5CC SIL ALLY W/ SIL HYDRGEL 2 W F

## (undated) DEVICE — TOWEL,OR,DSP,ST,NATURAL,DLX,4/PK,20PK/CS: Brand: MEDLINE

## (undated) DEVICE — CANNULA SEAL

## (undated) DEVICE — CYSTO/BLADDER IRRIGATION SET, REGULATING CLAMP

## (undated) DEVICE — ARM DRAPE

## (undated) DEVICE — INSUFFLATION TUBING SET WITH FILTER, FUNNEL CONNECTOR AND LUER LOCK: Brand: JOSNOE MEDICAL INC

## (undated) DEVICE — TROCAR: Brand: KII FIOS FIRST ENTRY

## (undated) DEVICE — GLOVE ORANGE PI 7   MSG9070

## (undated) DEVICE — STAPLER SHEATH: Brand: ENDOWRIST

## (undated) DEVICE — PROTECTOR ULN NRV PUR FOAM HK LOOP STRP ANATOMICALLY

## (undated) DEVICE — SPONGE GZ W4XL4IN COT 4 PLY WVN

## (undated) DEVICE — 40580 - THE PINK PAD - ADVANCED TRENDELENBURG POSITIONING KIT: Brand: 40580 - THE PINK PAD - ADVANCED TRENDELENBURG POSITIONING KIT

## (undated) DEVICE — NEEDLE SPINAL 22GA L3.5IN SPINOCAN

## (undated) DEVICE — GLOVE SURG SZ 65 THK91MIL LTX FREE SYN POLYISOPRENE

## (undated) DEVICE — ADHESIVE SKIN CLSR 0.7ML TOP DERMBND ADV

## (undated) DEVICE — UNDERPANTS MAT L/XL KNIT SEAMLESS CLR CODE WAISTBAND

## (undated) DEVICE — BLADELESS OBTURATOR: Brand: WECK VISTA

## (undated) DEVICE — SOLUTION ANTIFOG VIS SYS CLEARIFY LAPSCP

## (undated) DEVICE — Device: Brand: SPOT ENDOSOPIC MARKER

## (undated) DEVICE — SKIN AFFIX SURG ADHESIVE 72/CS 0.55ML: Brand: MEDLINE

## (undated) DEVICE — PAD,SANITARY,11 IN,MAXI,W/WINGS,N-STRL: Brand: MEDLINE

## (undated) DEVICE — SYRINGE MED 10ML LUERLOCK TIP W/O SFTY DISP

## (undated) DEVICE — Device: Brand: UTERINE ELEVATOR PRO

## (undated) DEVICE — TRI-LUMEN FILTERED TUBE SET WITH ACTIVATED CHARCOAL FILTER: Brand: AIRSEAL

## (undated) DEVICE — SOLUTION IV IRRIG WATER 1000ML POUR BRL 2F7114

## (undated) DEVICE — BINDER ABD MULT PANEL 72-84 IN 2XL 9 IN ELASTIC PROCARE

## (undated) DEVICE — ELECTRO LUBE IS A SINGLE PATIENT USE DEVICE THAT IS INTENDED TO BE USED ON ELECTROSURGICAL ELECTRODES TO REDUCE STICKING.: Brand: KEY SURGICAL ELECTRO LUBE

## (undated) DEVICE — FORCEPS BX L240CM WRK CHN 2.8MM STD CAP W/ NDL MIC MESH

## (undated) DEVICE — SEAL

## (undated) DEVICE — MEDI-VAC NON-CONDUCTIVE SUCTION TUBING 7MM X 6.1M (20 FT.) L: Brand: CARDINAL HEALTH

## (undated) DEVICE — 3M™ STERI-STRIP™ REINFORCED ADHESIVE SKIN CLOSURES, R1546, 1/4 IN X 4 IN (6 MM X 100 MM), 10 STRIPS/ENVELOPE: Brand: 3M™ STERI-STRIP™

## (undated) DEVICE — GLOVE ORANGE PI 7 1/2   MSG9075

## (undated) DEVICE — GLOVE SURG SZ 6 THK91MIL LTX FREE SYN POLYISOPRENE ANTI

## (undated) DEVICE — STRIP SKIN CLSR W0.25XL4IN WHT SPUNBOUND FBR NYL HI ADH

## (undated) DEVICE — GEL US 20GM NONIRRITATING OVERWRAPPED FILE PCH TRNSMIT

## (undated) DEVICE — BINDER ABD UNIV H9IN WAIST 45-62IN E SFT COT PREM 3 PNL

## (undated) DEVICE — Z DUP USE 2641840 CLIP INT L POLYMER LOK LIG HEM O LOK

## (undated) DEVICE — BAG SPEC LAP H6IN DIA3IN 250ML 10 12MM CANN ATTCH MEM WIRE

## (undated) DEVICE — 3M™ IOBAN™ 2 ANTIMICROBIAL INCISE DRAPE 6650EZ: Brand: IOBAN™ 2

## (undated) DEVICE — SVMMC GYN ROBOTIC PK

## (undated) DEVICE — GLOVE SURG SZ 8 L12IN FNGR THK79MIL GRN LTX FREE

## (undated) DEVICE — SUTURE VCRL SZ 4-0 L18IN ABSRB UD L19MM PS-2 3/8 CIR PRIM J496H

## (undated) DEVICE — GOWN,AURORA,NONREINFORCED,LARGE: Brand: MEDLINE

## (undated) DEVICE — SYRINGE,EAR/ULCER, 2 OZ, STERILE: Brand: MEDLINE

## (undated) DEVICE — Z DISCONTINUED USE 2423037 APPLICATOR MEDICATED 3 CC CHLORHEXIDINE GLUC 2% CHLORAPREP

## (undated) DEVICE — STANDARD HYPODERMIC NEEDLE,POLYPROPYLENE HUB: Brand: MONOJECT

## (undated) DEVICE — SCISSOR SURG METZ CRV TIP

## (undated) DEVICE — SUTURE PDS II SZ 0 L27IN ABSRB VLT L36MM CT-1 1/2 CIR Z340H

## (undated) DEVICE — BRUSH PRESOAK CLEAN BACTERIOSTATIC DUAL